# Patient Record
Sex: FEMALE | Race: BLACK OR AFRICAN AMERICAN | Employment: UNEMPLOYED | URBAN - METROPOLITAN AREA
[De-identification: names, ages, dates, MRNs, and addresses within clinical notes are randomized per-mention and may not be internally consistent; named-entity substitution may affect disease eponyms.]

---

## 2017-02-10 ENCOUNTER — OFFICE VISIT (OUTPATIENT)
Dept: FAMILY MEDICINE CLINIC | Facility: CLINIC | Age: 52
End: 2017-02-10

## 2017-02-10 VITALS
RESPIRATION RATE: 22 BRPM | BODY MASS INDEX: 40.43 KG/M2 | DIASTOLIC BLOOD PRESSURE: 78 MMHG | HEIGHT: 65 IN | OXYGEN SATURATION: 99 % | WEIGHT: 242.63 LBS | SYSTOLIC BLOOD PRESSURE: 126 MMHG | HEART RATE: 90 BPM | TEMPERATURE: 99 F

## 2017-02-10 DIAGNOSIS — H69.83 EUSTACHIAN TUBE DYSFUNCTION, BILATERAL: ICD-10-CM

## 2017-02-10 DIAGNOSIS — J30.2 SEASONAL ALLERGIC RHINITIS, UNSPECIFIED ALLERGIC RHINITIS TRIGGER: Primary | ICD-10-CM

## 2017-02-10 PROCEDURE — 99203 OFFICE O/P NEW LOW 30 MIN: CPT | Performed by: NURSE PRACTITIONER

## 2017-02-10 RX ORDER — ATORVASTATIN CALCIUM 80 MG/1
TABLET, FILM COATED ORAL
Refills: 0 | COMMUNITY
Start: 2016-11-18 | End: 2017-05-09

## 2017-02-10 RX ORDER — ATORVASTATIN CALCIUM 40 MG/1
TABLET, FILM COATED ORAL
Refills: 1 | COMMUNITY
Start: 2017-01-21 | End: 2017-02-10 | Stop reason: ALTCHOICE

## 2017-02-10 RX ORDER — METHYLPREDNISOLONE 4 MG/1
TABLET ORAL
Qty: 1 KIT | Refills: 0 | Status: SHIPPED | OUTPATIENT
Start: 2017-02-10 | End: 2017-05-09

## 2017-02-10 RX ORDER — FLUTICASONE PROPIONATE 50 MCG
2 SPRAY, SUSPENSION (ML) NASAL DAILY
Qty: 1 BOTTLE | Refills: 1 | Status: SHIPPED | OUTPATIENT
Start: 2017-02-10 | End: 2017-02-24

## 2017-02-10 RX ORDER — LOSARTAN POTASSIUM AND HYDROCHLOROTHIAZIDE 25; 100 MG/1; MG/1
TABLET ORAL
Refills: 1 | COMMUNITY
Start: 2017-01-22 | End: 2017-05-09

## 2017-02-10 NOTE — PROGRESS NOTES
CHIEF COMPLAINT:     Patient presents with:  Dizziness: throbbing in both ears X 1 wk states she has not been taking her BP meds last few days and has not felt sx, does not know if sx could be related to meds, does not know if could possibly be sx with ear EYES: conjunctiva clear, EOM intact, PERRLA, limited non-dialiated exam normal.  EARS: TM's bilaterally grey, no bulging, no retraction, ++ fluid, bony landmarks obscured  NOSE: nostrils edematous, no exudates, nasal mucosa erythematous and inflamed  THROA Find a new PCP. If no improvement in ear pain, follow up with prior PCP office associates. Patient Instructions       Understanding Nasal Allergies  Nasal allergies (also called allergic rhinitis) are a common health problem.  They may be seasonal. This · Nonallergic rhinitis and viruses such as colds  · Irritants and pollutants, such as strong odors or smoke  · Certain medicines  · Changes in the weather   Treatment  Your healthcare provider will evaluate you to find the cause of your symptoms then recom

## 2017-03-14 RX ORDER — FLUTICASONE PROPIONATE 50 MCG
SPRAY, SUSPENSION (ML) NASAL
Refills: 0 | OUTPATIENT
Start: 2017-03-14

## 2017-05-09 ENCOUNTER — OFFICE VISIT (OUTPATIENT)
Dept: INTERNAL MEDICINE CLINIC | Facility: CLINIC | Age: 52
End: 2017-05-09

## 2017-05-09 ENCOUNTER — TELEPHONE (OUTPATIENT)
Dept: INTERNAL MEDICINE CLINIC | Facility: CLINIC | Age: 52
End: 2017-05-09

## 2017-05-09 VITALS
DIASTOLIC BLOOD PRESSURE: 90 MMHG | HEIGHT: 65 IN | TEMPERATURE: 98 F | WEIGHT: 246 LBS | BODY MASS INDEX: 40.98 KG/M2 | HEART RATE: 94 BPM | SYSTOLIC BLOOD PRESSURE: 132 MMHG | RESPIRATION RATE: 20 BRPM

## 2017-05-09 DIAGNOSIS — Z12.11 SCREEN FOR COLON CANCER: ICD-10-CM

## 2017-05-09 DIAGNOSIS — E11.9 TYPE 2 DIABETES MELLITUS WITHOUT COMPLICATION, WITHOUT LONG-TERM CURRENT USE OF INSULIN (HCC): Primary | ICD-10-CM

## 2017-05-09 DIAGNOSIS — E11.9 DIABETIC EYE EXAM (HCC): ICD-10-CM

## 2017-05-09 DIAGNOSIS — Z12.31 SCREENING MAMMOGRAM, ENCOUNTER FOR: ICD-10-CM

## 2017-05-09 DIAGNOSIS — Z00.00 PE (PHYSICAL EXAM), ROUTINE: ICD-10-CM

## 2017-05-09 DIAGNOSIS — Z01.00 DIABETIC EYE EXAM (HCC): ICD-10-CM

## 2017-05-09 PROBLEM — E11.621 TYPE 2 DIABETES MELLITUS WITH FOOT ULCER, WITHOUT LONG-TERM CURRENT USE OF INSULIN (HCC): Status: ACTIVE | Noted: 2017-05-09

## 2017-05-09 PROBLEM — L97.509 TYPE 2 DIABETES MELLITUS WITH FOOT ULCER, WITHOUT LONG-TERM CURRENT USE OF INSULIN (HCC): Status: ACTIVE | Noted: 2017-05-09

## 2017-05-09 PROCEDURE — 99386 PREV VISIT NEW AGE 40-64: CPT | Performed by: INTERNAL MEDICINE

## 2017-05-09 RX ORDER — ATORVASTATIN CALCIUM 40 MG/1
40 TABLET, FILM COATED ORAL NIGHTLY
Qty: 90 TABLET | Refills: 1 | Status: SHIPPED | OUTPATIENT
Start: 2017-05-09 | End: 2017-09-06

## 2017-05-09 RX ORDER — LANCETS 30 GAUGE
EACH MISCELLANEOUS 3 TIMES DAILY
COMMUNITY
End: 2017-05-09

## 2017-05-09 RX ORDER — FLUTICASONE PROPIONATE 50 MCG
SPRAY, SUSPENSION (ML) NASAL
Refills: 1 | COMMUNITY
Start: 2017-03-09 | End: 2017-09-06

## 2017-05-09 RX ORDER — LANCETS 30 GAUGE
30 EACH MISCELLANEOUS 3 TIMES DAILY
Qty: 200 EACH | Refills: 11 | Status: SHIPPED | OUTPATIENT
Start: 2017-05-09

## 2017-05-09 RX ORDER — ATORVASTATIN CALCIUM 40 MG/1
40 TABLET, FILM COATED ORAL NIGHTLY
Refills: 1 | COMMUNITY
Start: 2017-04-23 | End: 2017-05-09

## 2017-05-09 RX ORDER — LOSARTAN POTASSIUM AND HYDROCHLOROTHIAZIDE 25; 100 MG/1; MG/1
TABLET ORAL
Qty: 90 TABLET | Refills: 1 | Status: SHIPPED | OUTPATIENT
Start: 2017-05-09 | End: 2017-09-06

## 2017-05-09 NOTE — PROGRESS NOTES
HPI:    Patient ID: Roman Dubose is a 45year old female.     HPI Comments: Patient patient presents today for physical exam and  dm2  - state her   Diabetes is stable , states doing well otherwise, denies chest pain, shortness of breath, dyspnea on exertion Nasal Suspension SHAKE LQ AND U 2 SPRAYS IEN D Disp:  Rfl: 1   MetFORMIN HCl 1000 MG Oral Tab TK 1 T PO BID before  Meals Disp: 180 tablet Rfl: 1   Losartan Potassium-HCTZ 100-25 MG Oral Tab TK 1 T PO D Disp: 90 tablet Rfl: 1   atorvastatin 40 MG Oral Tab Psychiatric: She has a normal mood and affect. Her behavior is normal.   Nursing note and vitals reviewed. Blood pressure 132/90, pulse 94, temperature 97.5 °F (36.4 °C), temperature source Oral, resp.  rate 20, height 5' 5\" (1.651 m), weight 246 lb [E]    Meds This Visit:  Signed Prescriptions Disp Refills    MetFORMIN HCl 1000 MG Oral Tab 180 tablet 1      Sig: TK 1 T PO BID before  Meals      Losartan Potassium-HCTZ 100-25 MG Oral Tab 90 tablet 1      Sig: TK 1 T PO D      atorvastatin 40 MG Oral T

## 2017-05-09 NOTE — TELEPHONE ENCOUNTER
Pt is asking for a note stating that she can get a dental deep cleaning due to her having diabetes. Is it ok to generate the note. Please advise, thank you.

## 2017-05-09 NOTE — TELEPHONE ENCOUNTER
Pt said EV was to do order/referral for a  Dentist- does not have information in paperwork from appt today with  EV  Requesting call back

## 2017-05-10 ENCOUNTER — TELEPHONE (OUTPATIENT)
Dept: INTERNAL MEDICINE CLINIC | Facility: CLINIC | Age: 52
End: 2017-05-10

## 2017-05-10 DIAGNOSIS — E11.9 DIABETIC EYE EXAM (HCC): Primary | ICD-10-CM

## 2017-05-10 DIAGNOSIS — Z01.00 DIABETIC EYE EXAM (HCC): Primary | ICD-10-CM

## 2017-05-10 NOTE — TELEPHONE ENCOUNTER
Dr. Jake Tian please be advised that referral canceled, Dr. Howie Posada is not in network. Please redirect patient to an in . Thank you.

## 2017-05-10 NOTE — TELEPHONE ENCOUNTER
Message was left on voice mail that the new referral for Dr. Rylee Marshall has been placed in the system and phone number was provided for scheduling the appt.

## 2017-05-10 NOTE — TELEPHONE ENCOUNTER
Pt  Need  Labs  Done - if  In Acceptable  Range -  patient  Does  not need approval  From me (  unless had   Heart  surgery  Or  Valve problems   ) For  Dental Cleaning .

## 2017-05-15 NOTE — TELEPHONE ENCOUNTER
Pt was contacted and was inquiring about the referral for ophthalmologist and she was informed was placed

## 2017-05-15 NOTE — TELEPHONE ENCOUNTER
Pt was contacted and informed that the referral is for opthalmology and the referral for the dentist is Dr. Kevin Renteria. Pt also stated that her insurance will only pay for the [employer] brand test strips.  TruMetrix was ordered and strips was sent to the pharmacy

## 2017-05-15 NOTE — TELEPHONE ENCOUNTER
Pt returning Dr. Liudmila Hannah Nurse call. Pt is requesting a callback from [place]. [facility] states call dropped and was unable to reach Pt. Please advise.

## 2017-05-18 ENCOUNTER — APPOINTMENT (OUTPATIENT)
Dept: ENDOCRINOLOGY | Facility: HOSPITAL | Age: 52
End: 2017-05-18
Attending: INTERNAL MEDICINE

## 2017-05-19 ENCOUNTER — TELEPHONE (OUTPATIENT)
Dept: FAMILY MEDICINE CLINIC | Facility: CLINIC | Age: 52
End: 2017-05-19

## 2017-05-19 DIAGNOSIS — R92.8 ABNORMAL MAMMOGRAM OF RIGHT BREAST: Primary | ICD-10-CM

## 2017-05-19 NOTE — TELEPHONE ENCOUNTER
Pharm is calling state that they receive a prescription for test strip state that pt inform them that she need a meter also   Pharm is requesting a order for a Diabetic Meter

## 2017-05-19 NOTE — TELEPHONE ENCOUNTER
Notified pt. Glucose meter and strips are approved and that her blood work is ordered for her to complete.

## 2017-07-05 ENCOUNTER — OFFICE VISIT (OUTPATIENT)
Dept: GASTROENTEROLOGY | Facility: CLINIC | Age: 52
End: 2017-07-05

## 2017-07-05 ENCOUNTER — TELEPHONE (OUTPATIENT)
Dept: GASTROENTEROLOGY | Facility: CLINIC | Age: 52
End: 2017-07-05

## 2017-07-05 ENCOUNTER — LAB ENCOUNTER (OUTPATIENT)
Dept: LAB | Facility: HOSPITAL | Age: 52
End: 2017-07-05
Attending: INTERNAL MEDICINE
Payer: COMMERCIAL

## 2017-07-05 ENCOUNTER — TELEPHONE (OUTPATIENT)
Dept: PODIATRY CLINIC | Facility: CLINIC | Age: 52
End: 2017-07-05

## 2017-07-05 ENCOUNTER — HOSPITAL ENCOUNTER (OUTPATIENT)
Dept: ENDOCRINOLOGY | Facility: HOSPITAL | Age: 52
Discharge: HOME | End: 2017-07-05
Attending: INTERNAL MEDICINE
Payer: COMMERCIAL

## 2017-07-05 ENCOUNTER — HOSPITAL ENCOUNTER (OUTPATIENT)
Dept: MAMMOGRAPHY | Facility: HOSPITAL | Age: 52
Discharge: HOME | End: 2017-07-05
Attending: INTERNAL MEDICINE
Payer: COMMERCIAL

## 2017-07-05 ENCOUNTER — OFFICE VISIT (OUTPATIENT)
Dept: PODIATRY CLINIC | Facility: CLINIC | Age: 52
End: 2017-07-05

## 2017-07-05 VITALS — BODY MASS INDEX: 41.55 KG/M2 | HEIGHT: 65 IN | WEIGHT: 249.38 LBS

## 2017-07-05 VITALS
HEIGHT: 66 IN | DIASTOLIC BLOOD PRESSURE: 81 MMHG | SYSTOLIC BLOOD PRESSURE: 124 MMHG | WEIGHT: 248.63 LBS | HEART RATE: 91 BPM

## 2017-07-05 DIAGNOSIS — Z12.11 COLON CANCER SCREENING: Primary | ICD-10-CM

## 2017-07-05 DIAGNOSIS — Z12.31 SCREENING MAMMOGRAM, ENCOUNTER FOR: ICD-10-CM

## 2017-07-05 DIAGNOSIS — R92.8 ABNORMAL MAMMOGRAM OF RIGHT BREAST: Primary | ICD-10-CM

## 2017-07-05 DIAGNOSIS — E11.9 TYPE 2 DIABETES MELLITUS WITHOUT COMPLICATION, WITHOUT LONG-TERM CURRENT USE OF INSULIN (HCC): Primary | ICD-10-CM

## 2017-07-05 DIAGNOSIS — E11.9 TYPE 2 DIABETES MELLITUS WITHOUT COMPLICATION, WITHOUT LONG-TERM CURRENT USE OF INSULIN (HCC): ICD-10-CM

## 2017-07-05 LAB
ALBUMIN SERPL BCP-MCNC: 3.6 G/DL (ref 3.5–4.8)
ALBUMIN/GLOB SERPL: 0.9 {RATIO} (ref 1–2)
ALP SERPL-CCNC: 68 U/L (ref 32–100)
ALT SERPL-CCNC: 21 U/L (ref 14–54)
ANION GAP SERPL CALC-SCNC: 8 MMOL/L (ref 0–18)
AST SERPL-CCNC: 16 U/L (ref 15–41)
BASOPHILS # BLD: 0.1 K/UL (ref 0–0.2)
BASOPHILS NFR BLD: 1 %
BILIRUB SERPL-MCNC: 0.7 MG/DL (ref 0.3–1.2)
BILIRUB UR QL: NEGATIVE
BUN SERPL-MCNC: 17 MG/DL (ref 8–20)
BUN/CREAT SERPL: 24.3 (ref 10–20)
CALCIUM SERPL-MCNC: 9.4 MG/DL (ref 8.5–10.5)
CHLORIDE SERPL-SCNC: 101 MMOL/L (ref 95–110)
CHOLEST SERPL-MCNC: 190 MG/DL (ref 110–200)
CO2 SERPL-SCNC: 28 MMOL/L (ref 22–32)
COLOR UR: YELLOW
CREAT SERPL-MCNC: 0.7 MG/DL (ref 0.5–1.5)
CREAT UR-MCNC: 87 MG/DL
EOSINOPHIL # BLD: 0.2 K/UL (ref 0–0.7)
EOSINOPHIL NFR BLD: 3 %
ERYTHROCYTE [DISTWIDTH] IN BLOOD BY AUTOMATED COUNT: 15.1 % (ref 11–15)
GLOBULIN PLAS-MCNC: 3.9 G/DL (ref 2.5–3.7)
GLUCOSE SERPL-MCNC: 152 MG/DL (ref 70–99)
GLUCOSE UR-MCNC: NEGATIVE MG/DL
HCT VFR BLD AUTO: 35.7 % (ref 35–48)
HDLC SERPL-MCNC: 39 MG/DL
HGB BLD-MCNC: 11.6 G/DL (ref 12–16)
HGB UR QL STRIP.AUTO: NEGATIVE
KETONES UR-MCNC: NEGATIVE MG/DL
LDLC SERPL CALC-MCNC: 130 MG/DL (ref 0–99)
LYMPHOCYTES # BLD: 2.9 K/UL (ref 1–4)
LYMPHOCYTES NFR BLD: 43 %
MCH RBC QN AUTO: 25.6 PG (ref 27–32)
MCHC RBC AUTO-ENTMCNC: 32.5 G/DL (ref 32–37)
MCV RBC AUTO: 78.9 FL (ref 80–100)
MICROALBUMIN UR-MCNC: 0.3 MG/DL (ref 0–1.8)
MICROALBUMIN/CREAT UR: 3.4 MG/G{CREAT} (ref 0–20)
MONOCYTES # BLD: 0.5 K/UL (ref 0–1)
MONOCYTES NFR BLD: 8 %
NEUTROPHILS # BLD AUTO: 3 K/UL (ref 1.8–7.7)
NEUTROPHILS NFR BLD: 45 %
NITRITE UR QL STRIP.AUTO: NEGATIVE
NONHDLC SERPL-MCNC: 151 MG/DL
OSMOLALITY UR CALC.SUM OF ELEC: 289 MOSM/KG (ref 275–295)
PH UR: 6 [PH] (ref 5–8)
PLATELET # BLD AUTO: 362 K/UL (ref 140–400)
PMV BLD AUTO: 8.2 FL (ref 7.4–10.3)
POTASSIUM SERPL-SCNC: 4.1 MMOL/L (ref 3.3–5.1)
PROT SERPL-MCNC: 7.5 G/DL (ref 5.9–8.4)
PROT UR-MCNC: NEGATIVE MG/DL
RBC # BLD AUTO: 4.52 M/UL (ref 3.7–5.4)
RBC #/AREA URNS AUTO: 1 /HPF
SODIUM SERPL-SCNC: 137 MMOL/L (ref 136–144)
SP GR UR STRIP: 1.01 (ref 1–1.03)
TRIGL SERPL-MCNC: 103 MG/DL (ref 1–149)
TSH SERPL-ACNC: 1.96 UIU/ML (ref 0.45–5.33)
UROBILINOGEN UR STRIP-ACNC: <2
VIT C UR-MCNC: NEGATIVE MG/DL
WBC # BLD AUTO: 6.7 K/UL (ref 4–11)
WBC #/AREA URNS AUTO: 4 /HPF

## 2017-07-05 PROCEDURE — 36415 COLL VENOUS BLD VENIPUNCTURE: CPT

## 2017-07-05 PROCEDURE — 81001 URINALYSIS AUTO W/SCOPE: CPT

## 2017-07-05 PROCEDURE — 99243 OFF/OP CNSLTJ NEW/EST LOW 30: CPT | Performed by: PODIATRIST

## 2017-07-05 PROCEDURE — 80053 COMPREHEN METABOLIC PANEL: CPT

## 2017-07-05 PROCEDURE — 82043 UR ALBUMIN QUANTITATIVE: CPT

## 2017-07-05 PROCEDURE — 83036 HEMOGLOBIN GLYCOSYLATED A1C: CPT

## 2017-07-05 PROCEDURE — 84443 ASSAY THYROID STIM HORMONE: CPT

## 2017-07-05 PROCEDURE — 77067 SCR MAMMO BI INCL CAD: CPT | Performed by: INTERNAL MEDICINE

## 2017-07-05 PROCEDURE — 99243 OFF/OP CNSLTJ NEW/EST LOW 30: CPT | Performed by: INTERNAL MEDICINE

## 2017-07-05 PROCEDURE — 80061 LIPID PANEL: CPT

## 2017-07-05 PROCEDURE — 82570 ASSAY OF URINE CREATININE: CPT

## 2017-07-05 PROCEDURE — 85025 COMPLETE CBC W/AUTO DIFF WBC: CPT

## 2017-07-05 PROCEDURE — 99212 OFFICE O/P EST SF 10 MIN: CPT | Performed by: INTERNAL MEDICINE

## 2017-07-05 PROCEDURE — 99212 OFFICE O/P EST SF 10 MIN: CPT | Performed by: PODIATRIST

## 2017-07-05 RX ORDER — ASPIRIN 81 MG/1
81 TABLET, CHEWABLE ORAL DAILY
COMMUNITY

## 2017-07-05 NOTE — TELEPHONE ENCOUNTER
Pt asking to talk to  about ankle injury - states she hurt her ankle again after she left his office today - asking if she should wrap it

## 2017-07-05 NOTE — PROGRESS NOTES
HPI:    Patient ID: Yelena Camp is a 45year old female. HPI  This 50-year-old female presents as a new patient to me on consult from [address] Patient states that she is here for a diabetic foot evaluation.   She is accompanied today by her daugh there is no evidence of loss of sensation. Patient is moderately active and has no calf pain with activity. She has no night cramps. She is wearing sandals today. At present I see no evidence of concern in reference to diabetes.   I cautioned her about

## 2017-07-05 NOTE — PROGRESS NOTES
Christopher Michael  : 1965 attended initial assessment for Diabetes Education:    Date: 2017   Start time: 1300  End time: 1410    Ht 65\"   Wt 249 lb 6.4 oz   Breastfeeding?  Yes   BMI 41.50 kg/m²      No results found for: A1C is pending      Diabete glucose levels. Introduced goal setting. Recommendations:      Monitor blood glucose as directed. Follow Basic Diet Guidelines. Attend Group Class series (4 classes)    Written materials provided for all areas covered.     Patient verbalized Sia Pearson

## 2017-07-05 NOTE — PROGRESS NOTES
Paolo Ludwig is a 45year old female. HPI:   Patient presents with:  Colonoscopy Screening: No prior colon. No current complaints. The patient is a 48-year-old female with a history of diabetes who presents for colon cancer screening.   The patient TK 1 T PO D Disp: 90 tablet Rfl: 1   atorvastatin 40 MG Oral Tab Take 1 tablet (40 mg total) by mouth nightly. Disp: 90 tablet Rfl: 1   LANCETS ULTRA THIN 30G Does not apply Misc 30 g by Does not apply route 3 (three) times daily.  Disp: 200 each Rfl: 11

## 2017-07-05 NOTE — TELEPHONE ENCOUNTER
Scheduled for:  Colonoscopy [address]4067  Provider Name:  Date:  9/14/17  Location:  [facility]  Sedation: Mac Sedation   Time:  11:15 am / arrival 10:15 am  Prep: Colyte Prep  Meds/Allergies Reconciled?:  Physician reviewed  Diagnosis with codes:  Colon Cancer

## 2017-07-05 NOTE — TELEPHONE ENCOUNTER
S/w pt and she states after her OV w/ SCR today she went for labs and hit her left ankle on furniture in the same spot it has been hurting and she's having a lot of pain. Pt is wondering is SCR can order some kind of support for her ankle?

## 2017-07-06 ENCOUNTER — PATIENT MESSAGE (OUTPATIENT)
Dept: INTERNAL MEDICINE CLINIC | Facility: CLINIC | Age: 52
End: 2017-07-06

## 2017-07-06 ENCOUNTER — TELEPHONE (OUTPATIENT)
Dept: GASTROENTEROLOGY | Facility: CLINIC | Age: 52
End: 2017-07-06

## 2017-07-06 LAB — HBA1C MFR BLD: 6.6 % (ref 4–6)

## 2017-07-06 NOTE — TELEPHONE ENCOUNTER
Current Outpatient Prescriptions:  aspirin 81 MG Oral Chew Tab Chew 81 mg by mouth daily. Disp:  Rfl:    Blood Glucose Monitoring Suppl (TRUE METRIX METER) w/Device Does not apply Kit 1 kit by Does not apply route 3 (three) times daily.  Disp: 1 kit Rfl:

## 2017-07-08 NOTE — TELEPHONE ENCOUNTER
From: Cierra Barrientos  To: Shefali Laughlin MD  Sent: 7/6/2017 9:52 AM CDT  Subject: Test Results Question    Had testing on yesterday signed up on my chart  Test results are still not showing for me to view

## 2017-07-10 ENCOUNTER — TELEPHONE (OUTPATIENT)
Dept: INTERNAL MEDICINE CLINIC | Facility: CLINIC | Age: 52
End: 2017-07-10

## 2017-07-10 NOTE — TELEPHONE ENCOUNTER
Notes Recorded by Rachele Smith MD on 7/9/2017 at 9:35 PM CDT  Addendum -correction cholesterol mildly elevated LDL decrease fat in diet ,exercise and continue present medications  ------    Notes Recorded by Rachele Smith MD on 7/9/2017 at 9:3

## 2017-07-10 NOTE — TELEPHONE ENCOUNTER
Spoke with patient (verified name and ), reviewed information, patient verbalized understanding and agrees with plan.    Reviewed diet modification such as reducing carbohydrates, fatty foods and alcohol intake, replacing them whenever possible with lean

## 2017-07-10 NOTE — TELEPHONE ENCOUNTER
Per pt, she saw her result in her Mychart but would like to talk to EV and explain about the result.

## 2017-07-10 NOTE — PROGRESS NOTES
Please call patient with blood test results.     Kidney and liver function are normal,   Mild anemia is present-might need some evaluation patient to follow-up in office next for 5 weeks for further testing- labs  Cholesterol is normal   sugar is stable A1c

## 2017-07-12 ENCOUNTER — PATIENT MESSAGE (OUTPATIENT)
Dept: INTERNAL MEDICINE CLINIC | Facility: CLINIC | Age: 52
End: 2017-07-12

## 2017-07-15 NOTE — TELEPHONE ENCOUNTER
From: Uma Godfrey  To: Kashif Wetzel MD  Sent: 7/12/2017 10:00 AM CDT  Subject: Test Results Question    Had Mammogram on 7/5/17 would like to know  if the results are completed. Thank you.     Have a wonderful day

## 2017-07-17 ENCOUNTER — PATIENT MESSAGE (OUTPATIENT)
Dept: INTERNAL MEDICINE CLINIC | Facility: CLINIC | Age: 52
End: 2017-07-17

## 2017-07-17 ENCOUNTER — TELEPHONE (OUTPATIENT)
Dept: INTERNAL MEDICINE CLINIC | Facility: CLINIC | Age: 52
End: 2017-07-17

## 2017-07-17 NOTE — TELEPHONE ENCOUNTER
Pt said she is returning Dr Varun Garcia call re mammogram  Joe Alexander received call on Sat to return call- nothing in [employer] message also sent via [address]

## 2017-07-18 NOTE — TELEPHONE ENCOUNTER
Pt was contacted and explained the results of her mammogram. Pt stated that she call and got an appt for next week for additional views of her breast for the mammogram but that it was mentioned about a biopsy.  Pt was informed that they probably will do the

## 2017-07-19 NOTE — TELEPHONE ENCOUNTER
From: Genet Mcmillan  To: Melecio Raines MD  Sent: 7/17/2017 11:13 AM CDT  Subject: Test Results Question    Received a messages on my chart from  Dr. Orestes Del Valle along with a voice mail message  requesting I contact the office and ask for  her regarding my M

## 2017-07-19 NOTE — TELEPHONE ENCOUNTER
Patient already notified.  See below:    July 18, 2017   Karen Lira LPN     46:78 AM   Note      Pt was contacted and explained the results of her mammogram. Pt stated that she call and got an appt for next week for additional views of her breast for th

## 2017-07-26 ENCOUNTER — HOSPITAL ENCOUNTER (OUTPATIENT)
Dept: MAMMOGRAPHY | Facility: HOSPITAL | Age: 52
Discharge: HOME | End: 2017-07-26
Attending: INTERNAL MEDICINE
Payer: COMMERCIAL

## 2017-07-26 ENCOUNTER — HOSPITAL ENCOUNTER (OUTPATIENT)
Dept: ULTRASOUND IMAGING | Facility: HOSPITAL | Age: 52
Discharge: HOME | End: 2017-07-26
Attending: INTERNAL MEDICINE
Payer: COMMERCIAL

## 2017-07-26 DIAGNOSIS — R92.8 ABNORMAL MAMMOGRAM: ICD-10-CM

## 2017-07-26 PROCEDURE — 77065 DX MAMMO INCL CAD UNI: CPT | Performed by: INTERNAL MEDICINE

## 2017-07-26 PROCEDURE — 76642 ULTRASOUND BREAST LIMITED: CPT | Performed by: INTERNAL MEDICINE

## 2017-08-02 ENCOUNTER — TELEPHONE (OUTPATIENT)
Dept: ENDOCRINOLOGY | Facility: HOSPITAL | Age: 52
End: 2017-08-02

## 2017-08-02 ENCOUNTER — TELEPHONE (OUTPATIENT)
Dept: INTERNAL MEDICINE CLINIC | Facility: CLINIC | Age: 52
End: 2017-08-02

## 2017-08-02 NOTE — TELEPHONE ENCOUNTER
Pt calling regarding her mammogram results. Pt state she was advised to see a specialist.  Pt needs more clarification regarding her results. Also pt wants to discuss her anemia results as well. .please advise

## 2017-08-03 NOTE — TELEPHONE ENCOUNTER
Pt was given the results of her mammogram and US. Pt had questions regarding her appt with the breast specialist and information was given and phone number to schedule the appt. Understanding was voiced.

## 2017-08-18 ENCOUNTER — OFFICE VISIT (OUTPATIENT)
Dept: SURGERY | Facility: CLINIC | Age: 52
End: 2017-08-18

## 2017-08-18 VITALS
WEIGHT: 245 LBS | RESPIRATION RATE: 20 BRPM | DIASTOLIC BLOOD PRESSURE: 80 MMHG | BODY MASS INDEX: 41 KG/M2 | TEMPERATURE: 98 F | SYSTOLIC BLOOD PRESSURE: 116 MMHG | HEART RATE: 99 BPM

## 2017-08-18 DIAGNOSIS — R92.8 ABNORMAL MAMMOGRAM OF RIGHT BREAST: Primary | ICD-10-CM

## 2017-08-18 PROCEDURE — 99244 OFF/OP CNSLTJ NEW/EST MOD 40: CPT | Performed by: SURGERY

## 2017-08-18 NOTE — PROGRESS NOTES
Pt had screening mammogram 7/5/17, additional views and US on 7/28/17. She doesn't feel anything, denies breast pain.

## 2017-08-21 ENCOUNTER — TELEPHONE (OUTPATIENT)
Dept: INTERNAL MEDICINE CLINIC | Facility: CLINIC | Age: 52
End: 2017-08-21

## 2017-08-21 ENCOUNTER — SOCIAL WORK SERVICES (OUTPATIENT)
Dept: HEMATOLOGY/ONCOLOGY | Facility: HOSPITAL | Age: 52
End: 2017-08-21

## 2017-08-21 NOTE — PROGRESS NOTES
MELODY received call from patient. [facility] date is [facility]1 so she is in the open enrollment period now. She wishes to switch to [facility] and was told that Daren Rehman is not in network.   MELODY advises that this IS among the m

## 2017-08-21 NOTE — TELEPHONE ENCOUNTER
Pt states she is returning call from [address] with EV from Friday  Nothing in Epic  Requesting callback today

## 2017-08-23 NOTE — TELEPHONE ENCOUNTER
Pt was contacted and it was stated that she was having questions regarding which insurance to take. Pt was informed that she will have to decide on which one to choose. Understanding was voiced.  Pt also had questions regarding her anemia and it was stated

## 2017-09-05 ENCOUNTER — TELEPHONE (OUTPATIENT)
Dept: INTERNAL MEDICINE CLINIC | Facility: CLINIC | Age: 52
End: 2017-09-05

## 2017-09-06 ENCOUNTER — OFFICE VISIT (OUTPATIENT)
Dept: INTERNAL MEDICINE CLINIC | Facility: CLINIC | Age: 52
End: 2017-09-06

## 2017-09-06 VITALS
DIASTOLIC BLOOD PRESSURE: 84 MMHG | WEIGHT: 249 LBS | TEMPERATURE: 97 F | HEIGHT: 66 IN | RESPIRATION RATE: 20 BRPM | BODY MASS INDEX: 40.02 KG/M2 | SYSTOLIC BLOOD PRESSURE: 125 MMHG | HEART RATE: 91 BPM

## 2017-09-06 DIAGNOSIS — D57.3 SICKLE CELL TRAIT (HCC): ICD-10-CM

## 2017-09-06 DIAGNOSIS — D64.9 ANEMIA, UNSPECIFIED TYPE: Primary | ICD-10-CM

## 2017-09-06 DIAGNOSIS — M76.60 ACHILLES TENDON PAIN: ICD-10-CM

## 2017-09-06 PROCEDURE — 99212 OFFICE O/P EST SF 10 MIN: CPT | Performed by: INTERNAL MEDICINE

## 2017-09-06 PROCEDURE — 99214 OFFICE O/P EST MOD 30 MIN: CPT | Performed by: INTERNAL MEDICINE

## 2017-09-06 RX ORDER — FLUTICASONE PROPIONATE 50 MCG
SPRAY, SUSPENSION (ML) NASAL
Qty: 1 BOTTLE | Refills: 1 | Status: SHIPPED | OUTPATIENT
Start: 2017-09-06 | End: 2017-10-26

## 2017-09-06 RX ORDER — LOSARTAN POTASSIUM AND HYDROCHLOROTHIAZIDE 25; 100 MG/1; MG/1
TABLET ORAL
Qty: 90 TABLET | Refills: 1 | Status: SHIPPED | OUTPATIENT
Start: 2017-09-06 | End: 2018-03-13

## 2017-09-06 RX ORDER — ATORVASTATIN CALCIUM 40 MG/1
40 TABLET, FILM COATED ORAL NIGHTLY
Qty: 90 TABLET | Refills: 1 | Status: SHIPPED | OUTPATIENT
Start: 2017-09-06 | End: 2018-03-13

## 2017-09-06 NOTE — PROGRESS NOTES
HPI:    Patient ID: Maria Antonia Rich is a 47year old female. Anemia   This is a chronic problem. The current episode started more than 1 year ago (hx  sickle  cell  atrait ). The problem has been unchanged.  Associated symptoms include arthralgias (r   poste aspirin 81 MG Oral Chew Tab Chew 81 mg by mouth daily. Disp:  Rfl:    Blood Glucose Monitoring Suppl (TRUE METRIX METER) w/Device Does not apply Kit 1 kit by Does not apply route 3 (three) times daily.  Disp: 1 kit Rfl: 0   Glucose Blood (TRUE METRIX BLOOD Psychiatric: She has a normal mood and affect. Her behavior is normal.   Nursing note and vitals reviewed. Blood pressure 125/84, pulse 91, temperature (!) 97.4 °F (36.3 °C), temperature source Oral, resp.  rate 20, height 5' 6\" (1.676 m), weight 249

## 2017-09-13 ENCOUNTER — TELEPHONE (OUTPATIENT)
Dept: GASTROENTEROLOGY | Facility: CLINIC | Age: 52
End: 2017-09-13

## 2017-09-13 NOTE — TELEPHONE ENCOUNTER
Sent call to RN - Pt has Questions re: Diet & Preps for 9/14/2017 CLN - ok to have crackers with broth and ok to use bottled water vs tap for prep mixture? Pls call. Thank you.

## 2017-09-13 NOTE — TELEPHONE ENCOUNTER
Pt transferred from phone room. Reviewed all diet instructions/prep for tomorrow's colonoscopy. She is holding her Metformin. She is to take her B/P med tomorrow morning with a tiny sip of water. She has her .  No further questions, states understandi

## 2017-09-14 ENCOUNTER — SURGERY (OUTPATIENT)
Age: 52
End: 2017-09-14

## 2017-09-14 ENCOUNTER — HOSPITAL ENCOUNTER (OUTPATIENT)
Facility: HOSPITAL | Age: 52
Setting detail: HOSPITAL OUTPATIENT SURGERY
Discharge: HOME | End: 2017-09-14
Attending: INTERNAL MEDICINE | Admitting: INTERNAL MEDICINE
Payer: COMMERCIAL

## 2017-09-14 ENCOUNTER — ANESTHESIA (OUTPATIENT)
Dept: ENDOSCOPY | Facility: HOSPITAL | Age: 52
End: 2017-09-14
Payer: COMMERCIAL

## 2017-09-14 ENCOUNTER — ANESTHESIA EVENT (OUTPATIENT)
Dept: ENDOSCOPY | Facility: HOSPITAL | Age: 52
End: 2017-09-14
Payer: COMMERCIAL

## 2017-09-14 VITALS
SYSTOLIC BLOOD PRESSURE: 144 MMHG | DIASTOLIC BLOOD PRESSURE: 94 MMHG | HEIGHT: 66 IN | WEIGHT: 240 LBS | RESPIRATION RATE: 16 BRPM | HEART RATE: 77 BPM | OXYGEN SATURATION: 97 % | BODY MASS INDEX: 38.57 KG/M2

## 2017-09-14 DIAGNOSIS — K63.5 POLYP OF DESCENDING COLON, UNSPECIFIED TYPE: ICD-10-CM

## 2017-09-14 DIAGNOSIS — K57.90 DIVERTICULOSIS OF INTESTINE WITHOUT BLEEDING, UNSPECIFIED INTESTINAL TRACT LOCATION: ICD-10-CM

## 2017-09-14 DIAGNOSIS — K64.9 HEMORRHOIDS, UNSPECIFIED HEMORRHOID TYPE: ICD-10-CM

## 2017-09-14 DIAGNOSIS — Z12.11 SCREENING FOR MALIGNANT NEOPLASM OF COLON: ICD-10-CM

## 2017-09-14 DIAGNOSIS — K63.5 POLYP OF SIGMOID COLON, UNSPECIFIED TYPE: Primary | ICD-10-CM

## 2017-09-14 LAB — GLUCOSE BLDC GLUCOMTR-MCNC: 134 MG/DL (ref 70–99)

## 2017-09-14 PROCEDURE — 45385 COLONOSCOPY W/LESION REMOVAL: CPT | Performed by: INTERNAL MEDICINE

## 2017-09-14 PROCEDURE — 0DBM8ZX EXCISION OF DESCENDING COLON, VIA NATURAL OR ARTIFICIAL OPENING ENDOSCOPIC, DIAGNOSTIC: ICD-10-PCS | Performed by: INTERNAL MEDICINE

## 2017-09-14 PROCEDURE — 0DBN8ZX EXCISION OF SIGMOID COLON, VIA NATURAL OR ARTIFICIAL OPENING ENDOSCOPIC, DIAGNOSTIC: ICD-10-PCS | Performed by: INTERNAL MEDICINE

## 2017-09-14 RX ORDER — MIDAZOLAM HYDROCHLORIDE 1 MG/ML
INJECTION INTRAMUSCULAR; INTRAVENOUS AS NEEDED
Status: DISCONTINUED | OUTPATIENT
Start: 2017-09-14 | End: 2017-09-14 | Stop reason: SURG

## 2017-09-14 RX ORDER — NALOXONE HYDROCHLORIDE 0.4 MG/ML
80 INJECTION, SOLUTION INTRAMUSCULAR; INTRAVENOUS; SUBCUTANEOUS AS NEEDED
Status: DISCONTINUED | OUTPATIENT
Start: 2017-09-14 | End: 2017-09-14

## 2017-09-14 RX ORDER — LIDOCAINE HYDROCHLORIDE 10 MG/ML
INJECTION, SOLUTION EPIDURAL; INFILTRATION; INTRACAUDAL; PERINEURAL AS NEEDED
Status: DISCONTINUED | OUTPATIENT
Start: 2017-09-14 | End: 2017-09-14 | Stop reason: SURG

## 2017-09-14 RX ORDER — SODIUM CHLORIDE, SODIUM LACTATE, POTASSIUM CHLORIDE, CALCIUM CHLORIDE 600; 310; 30; 20 MG/100ML; MG/100ML; MG/100ML; MG/100ML
INJECTION, SOLUTION INTRAVENOUS CONTINUOUS
Status: DISCONTINUED | OUTPATIENT
Start: 2017-09-14 | End: 2017-09-14

## 2017-09-14 RX ADMIN — MIDAZOLAM HYDROCHLORIDE 1 MG: 1 INJECTION INTRAMUSCULAR; INTRAVENOUS at 11:46:00

## 2017-09-14 RX ADMIN — SODIUM CHLORIDE, SODIUM LACTATE, POTASSIUM CHLORIDE, CALCIUM CHLORIDE: 600; 310; 30; 20 INJECTION, SOLUTION INTRAVENOUS at 11:33:00

## 2017-09-14 RX ADMIN — SODIUM CHLORIDE, SODIUM LACTATE, POTASSIUM CHLORIDE, CALCIUM CHLORIDE: 600; 310; 30; 20 INJECTION, SOLUTION INTRAVENOUS at 12:01:00

## 2017-09-14 RX ADMIN — LIDOCAINE HYDROCHLORIDE 50 MG: 10 INJECTION, SOLUTION EPIDURAL; INFILTRATION; INTRACAUDAL; PERINEURAL at 11:36:00

## 2017-09-14 RX ADMIN — MIDAZOLAM HYDROCHLORIDE 1 MG: 1 INJECTION INTRAMUSCULAR; INTRAVENOUS at 11:45:00

## 2017-09-14 NOTE — ANESTHESIA PREPROCEDURE EVALUATION
Anesthesia PreOp Note    HPI:     Taylor Patton is a 47year old female who presents for preoperative consultation requested by: Nilesh Ash MD    Date of Surgery: 9/14/2017    Procedure(s):  COLONOSCOPY  Indication: Screening for malignant neoplasm No current Epic-ordered facility-administered medications on file. No current [facility]-ordered outpatient prescriptions on file.       Doxycycline                 Family History   Problem Relation Age of Onset   • Diabetes Mother    • Other[other] Luciano Lovell [de-identified] Neuro/Psych - negative ROS     GI/Hepatic/Renal - negative ROS     Endo/Other    (+) diabetes mellitus type 2 well controlled,     Comments: Sickle cell trait  Abdominal              Anesthesia Plan:   ASA:  3  Plan:   MAC  Informed Consent Plan and Risks

## 2017-09-14 NOTE — OPERATIVE REPORT
[facility] HOSP - [facility] Endoscopy Report      Preoperative Diagnosis:  - colon screening      Postoperative Diagnosis:  - colon polyps x 2  - diverticulosis  - internal hemorrhoids      Procedure:    Colonoscopy       Surgeon:  Caryn Trivedi M.D.     An

## 2017-09-14 NOTE — H&P
History & Physical Examination    Patient Name: Fabiola Gurrola  MRN: [id number]  CSN: [id number]  YOB: 1965    Diagnosis: colon screening        Prescriptions Prior to Admission:  Fluticasone Propionate 50 MCG/ACT Nasal Suspension SHAKE LQ AND HEART [x ] [ x]    LUNGS [x ] [ x]    ABDOMEN [x ] [x ]    UROGENITAL [ ] [ ]    EXTREMITIES [x ] [x ]    OTHER        [ x ] I have discussed the risks and benefits and alternatives with the patient/family.   They understand and agree to proceed with plan

## 2017-09-14 NOTE — ANESTHESIA POSTPROCEDURE EVALUATION
Patient: Jesenia Finnegan    Procedure Summary     Date:  09/14/17 Room / Location:  [facility] ENDOSCOPY 05 / [facility] ENDOSCOPY    Anesthesia Start:  5111 Anesthesia Stop:      Procedure:  COLONOSCOPY (N/A ) Diagnosis:       Screening for malignant neoplasm of colon

## 2017-09-19 ENCOUNTER — TELEPHONE (OUTPATIENT)
Dept: GASTROENTEROLOGY | Facility: CLINIC | Age: 52
End: 2017-09-19

## 2017-09-19 NOTE — TELEPHONE ENCOUNTER
----- Message from Fior Benavides MD sent at 9/18/2017  6:55 PM CDT -----  I wanted to get back to you with your colonoscopy results. You had 2 colon polyps removed which were benign.   I would advise a repeat colonoscopy in 3 years to make sure no new p

## 2017-09-20 ENCOUNTER — TELEPHONE (OUTPATIENT)
Dept: GASTROENTEROLOGY | Facility: CLINIC | Age: 52
End: 2017-09-20

## 2017-09-20 NOTE — TELEPHONE ENCOUNTER
Pt called for results. She did not receive message via My Chart (My Chart is down today per pt). Please call.

## 2017-09-20 NOTE — TELEPHONE ENCOUNTER
Left message on voicemail to call for results. Please see the \"lab\" tab and review 9/14 pathology-- results there.

## 2017-10-28 RX ORDER — FLUTICASONE PROPIONATE 50 MCG
SPRAY, SUSPENSION (ML) NASAL
Qty: 1 INHALER | Refills: 0 | Status: SHIPPED | OUTPATIENT
Start: 2017-10-28 | End: 2018-03-13

## 2017-10-28 NOTE — TELEPHONE ENCOUNTER
Refill Protocol Appointment Criteria  · Appointment scheduled in the past 6 months or in the next 3 months  Recent Outpatient Visits            1 month ago Anemia, unspecified type    [place] [facility], [place] Corrin Manner, MD    Office Vi

## 2017-11-21 NOTE — TELEPHONE ENCOUNTER
Signed Prescriptions Disp Refills    METFORMIN HCL 1000 MG Oral Tab 180 tablet 0      Sig: TAKE 1 TABLET BY MOUTH TWICE DAILY BEFORE MEALS        Authorizing Provider: Meliza Mishra        Ordering User: Valerie Vazquez           Refill approved per p

## 2017-12-06 ENCOUNTER — TELEPHONE (OUTPATIENT)
Dept: INTERNAL MEDICINE CLINIC | Facility: CLINIC | Age: 52
End: 2017-12-06

## 2017-12-06 NOTE — TELEPHONE ENCOUNTER
PA for TrueMetrix glucose test strips completed with EPS via CMM response time 24-72 hours KEY [id number].

## 2017-12-11 NOTE — PROGRESS NOTES
Breast Surgery New Patient Consultation    This is the first visit for this 47year old woman, referred by Dr. Monie Khalil, who presents for evaluation of abnormal breast imaging.     History of Present Illness:   Ms. Adriana Christine is a 47year old woman wh 100-25 MG Oral Tab TK 1 T PO D Disp: 90 tablet Rfl: 1   [DISCONTINUED] atorvastatin 40 MG Oral Tab Take 1 tablet (40 mg total) by mouth nightly.  Disp: 90 tablet Rfl: 1       Allergies:      Doxycycline                 Family History:   Family History   Pro urination, needing to get up at night to urinate, urinary hesitancy or retaining urine, painful urination, urinary incontinence, decreased urine stream, blood in the urine or vaginal/penile discharge.     Skin:    The patient denies rash, itching, skin lesi thrills. Breasts:  Her breasts are symmetrical.  Right breast: The skin, nipple ,and areola appear normal. There is no skin dimpling with movement of the pectoralis. There is no nipple retraction. No nipple discharge can be elicited.  The parenchyma is m overall likely benign nature of this I do not think this is mandatory at this time. Additionally, we discussed that sometimes MRI can be helpful for evaluating findings that are obscured on other modalities of imaging.   However, we discussed the high fals

## 2017-12-14 NOTE — TELEPHONE ENCOUNTER
True Metrix glucose test strips #100/30 days approved for 32 days effective 12/6/2017; patient notified via [employer].

## 2018-01-31 RX ORDER — LOSARTAN POTASSIUM AND HYDROCHLOROTHIAZIDE 25; 100 MG/1; MG/1
TABLET ORAL
Qty: 90 TABLET | Refills: 0 | Status: SHIPPED | OUTPATIENT
Start: 2018-01-31 | End: 2018-03-13

## 2018-01-31 NOTE — TELEPHONE ENCOUNTER
Hypertensive Medications: Refilled per protocol    Protocol Criteria:  · Appointment scheduled in the past 6 months or in the next 3 months  · BMP or CMP in the past 12 months  · Creatinine result < 2  Recent Outpatient Visits            4 months ago Anemi

## 2018-02-09 ENCOUNTER — HOSPITAL ENCOUNTER (EMERGENCY)
Facility: HOSPITAL | Age: 53
Discharge: HOME | End: 2018-02-09
Attending: EMERGENCY MEDICINE
Payer: MEDICAID

## 2018-02-09 VITALS
WEIGHT: 230 LBS | TEMPERATURE: 98 F | DIASTOLIC BLOOD PRESSURE: 81 MMHG | BODY MASS INDEX: 38.32 KG/M2 | HEIGHT: 65 IN | HEART RATE: 82 BPM | SYSTOLIC BLOOD PRESSURE: 127 MMHG | OXYGEN SATURATION: 99 % | RESPIRATION RATE: 20 BRPM

## 2018-02-09 DIAGNOSIS — S33.5XXA SPRAIN OF LOW BACK, INITIAL ENCOUNTER: Primary | ICD-10-CM

## 2018-02-09 PROCEDURE — 99283 EMERGENCY DEPT VISIT LOW MDM: CPT

## 2018-02-09 RX ORDER — CYCLOBENZAPRINE HCL 10 MG
10 TABLET ORAL 3 TIMES DAILY PRN
Qty: 20 TABLET | Refills: 0 | Status: SHIPPED | OUTPATIENT
Start: 2018-02-09 | End: 2018-02-16

## 2018-02-09 RX ORDER — IBUPROFEN 600 MG/1
600 TABLET ORAL EVERY 8 HOURS PRN
Qty: 30 TABLET | Refills: 0 | Status: SHIPPED | OUTPATIENT
Start: 2018-02-09 | End: 2018-02-19

## 2018-02-09 NOTE — ED PROVIDER NOTES
Patient Seen in: [facility] Emergency Department    History   Patient presents with:  Fall (musculoskeletal, neurologic)    Stated Complaint: fall    HPI    53-year-old female presents emergency department who complains of slipping and falling on th scleral icterus, mucous membranes are moist, there is no erythema or exudate in the posterior pharynx  Neck: Supple no JVD no lymphadenopathy no meningismus no carotid bruit  CV: Regular rate and rhythm no murmur rub  Respiratory: Clear to auscultation corrina 0

## 2018-02-09 NOTE — ED INITIAL ASSESSMENT (HPI)
Pt her for fall on ice. Pt fell on right knee and then on to back. Pt states right lower back pain with no radiation. Pt denies hitting head.

## 2018-02-12 ENCOUNTER — APPOINTMENT (OUTPATIENT)
Dept: GENERAL RADIOLOGY | Facility: HOSPITAL | Age: 53
End: 2018-02-12
Attending: EMERGENCY MEDICINE
Payer: MEDICAID

## 2018-02-12 ENCOUNTER — HOSPITAL ENCOUNTER (OUTPATIENT)
Age: 53
Discharge: ANOTHER HOSPITAL | End: 2018-02-12
Attending: FAMILY MEDICINE
Payer: MEDICAID

## 2018-02-12 ENCOUNTER — APPOINTMENT (OUTPATIENT)
Dept: CT IMAGING | Facility: HOSPITAL | Age: 53
End: 2018-02-12
Attending: EMERGENCY MEDICINE
Payer: MEDICAID

## 2018-02-12 ENCOUNTER — HOSPITAL ENCOUNTER (EMERGENCY)
Facility: HOSPITAL | Age: 53
Discharge: HOME | End: 2018-02-12
Attending: EMERGENCY MEDICINE
Payer: MEDICAID

## 2018-02-12 VITALS
HEIGHT: 65 IN | RESPIRATION RATE: 18 BRPM | WEIGHT: 230 LBS | TEMPERATURE: 98 F | HEART RATE: 96 BPM | SYSTOLIC BLOOD PRESSURE: 117 MMHG | DIASTOLIC BLOOD PRESSURE: 96 MMHG | OXYGEN SATURATION: 98 % | BODY MASS INDEX: 38.32 KG/M2

## 2018-02-12 VITALS
TEMPERATURE: 99 F | BODY MASS INDEX: 38.32 KG/M2 | WEIGHT: 230 LBS | DIASTOLIC BLOOD PRESSURE: 78 MMHG | HEIGHT: 65 IN | HEART RATE: 78 BPM | SYSTOLIC BLOOD PRESSURE: 135 MMHG | RESPIRATION RATE: 16 BRPM | OXYGEN SATURATION: 98 %

## 2018-02-12 DIAGNOSIS — S20.229A CONTUSION OF BACK, UNSPECIFIED LATERALITY, INITIAL ENCOUNTER: ICD-10-CM

## 2018-02-12 DIAGNOSIS — M54.50 ACUTE MIDLINE LOW BACK PAIN WITHOUT SCIATICA: ICD-10-CM

## 2018-02-12 DIAGNOSIS — R22.0 LEFT FACIAL SWELLING: ICD-10-CM

## 2018-02-12 DIAGNOSIS — J01.00 ACUTE MAXILLARY SINUSITIS, RECURRENCE NOT SPECIFIED: Primary | ICD-10-CM

## 2018-02-12 DIAGNOSIS — J02.9 PHARYNGITIS, UNSPECIFIED ETIOLOGY: Primary | ICD-10-CM

## 2018-02-12 PROCEDURE — 96374 THER/PROPH/DIAG INJ IV PUSH: CPT

## 2018-02-12 PROCEDURE — 99205 OFFICE O/P NEW HI 60 MIN: CPT

## 2018-02-12 PROCEDURE — 96375 TX/PRO/DX INJ NEW DRUG ADDON: CPT

## 2018-02-12 PROCEDURE — 99285 EMERGENCY DEPT VISIT HI MDM: CPT

## 2018-02-12 PROCEDURE — 96372 THER/PROPH/DIAG INJ SC/IM: CPT

## 2018-02-12 PROCEDURE — 72220 X-RAY EXAM SACRUM TAILBONE: CPT | Performed by: EMERGENCY MEDICINE

## 2018-02-12 PROCEDURE — 72100 X-RAY EXAM L-S SPINE 2/3 VWS: CPT | Performed by: EMERGENCY MEDICINE

## 2018-02-12 PROCEDURE — 99215 OFFICE O/P EST HI 40 MIN: CPT

## 2018-02-12 PROCEDURE — 70450 CT HEAD/BRAIN W/O DYE: CPT | Performed by: EMERGENCY MEDICINE

## 2018-02-12 PROCEDURE — 99284 EMERGENCY DEPT VISIT MOD MDM: CPT

## 2018-02-12 RX ORDER — AMOXICILLIN AND CLAVULANATE POTASSIUM 875; 125 MG/1; MG/1
1 TABLET, FILM COATED ORAL 2 TIMES DAILY
Qty: 20 TABLET | Refills: 0 | Status: SHIPPED | OUTPATIENT
Start: 2018-02-12 | End: 2018-02-22

## 2018-02-12 RX ORDER — DEXAMETHASONE SODIUM PHOSPHATE 4 MG/ML
10 VIAL (ML) INJECTION ONCE
Status: COMPLETED | OUTPATIENT
Start: 2018-02-12 | End: 2018-02-12

## 2018-02-12 RX ORDER — MORPHINE SULFATE 10 MG/ML
10 INJECTION, SOLUTION INTRAMUSCULAR; INTRAVENOUS ONCE
Status: COMPLETED | OUTPATIENT
Start: 2018-02-12 | End: 2018-02-12

## 2018-02-12 RX ORDER — MORPHINE SULFATE 2 MG/ML
4 INJECTION, SOLUTION INTRAMUSCULAR; INTRAVENOUS EVERY 30 MIN PRN
Status: DISCONTINUED | OUTPATIENT
Start: 2018-02-12 | End: 2018-02-12

## 2018-02-12 RX ORDER — PREDNISONE 20 MG/1
40 TABLET ORAL DAILY
Qty: 10 TABLET | Refills: 0 | Status: SHIPPED | OUTPATIENT
Start: 2018-02-12 | End: 2018-02-17

## 2018-02-12 RX ORDER — ONDANSETRON 4 MG/1
4 TABLET, ORALLY DISINTEGRATING ORAL ONCE
Status: COMPLETED | OUTPATIENT
Start: 2018-02-12 | End: 2018-02-12

## 2018-02-12 RX ORDER — HYDROCODONE BITARTRATE AND ACETAMINOPHEN 5; 325 MG/1; MG/1
1 TABLET ORAL ONCE
Status: COMPLETED | OUTPATIENT
Start: 2018-02-12 | End: 2018-02-12

## 2018-02-12 RX ORDER — HYDROCODONE BITARTRATE AND ACETAMINOPHEN 5; 325 MG/1; MG/1
1-2 TABLET ORAL EVERY 4 HOURS PRN
Qty: 20 TABLET | Refills: 0 | Status: SHIPPED | OUTPATIENT
Start: 2018-02-12 | End: 2018-02-19

## 2018-02-12 NOTE — ED INITIAL ASSESSMENT (HPI)
Pt arrived alert and responsive. Pt has multiple complaints. Pt c/o facial swelling, right flank pain, nasal congestion, pt states she slipped and fell on the ice on Friday. Pt was seen in the ed on Friday.  Pt not sure if she's having an allergic reaction

## 2018-02-12 NOTE — ED INITIAL ASSESSMENT (HPI)
fell on Friday, seen in ER sent home with flexeril and motrin seen for facial swelling L above eye , MD thinks sinusitis, need ct of sinuses and sacrum per family.

## 2018-02-12 NOTE — ED NOTES
Bedside report given to Juan [facility]. Pt resting on cart, daughter at bedside. Pt states would like to have IV started because she has not received any relief from IM medication. MD notified.

## 2018-02-12 NOTE — ED PROVIDER NOTES
Patient Seen in: [facility] Emergency Department    History   Patient presents with:  Cellulitis (integumentary, infectious)  Fall (musculoskeletal, neurologic)    Stated Complaint: facial swelling- sent from urgent care for CT of sinuses as well as Review of Systems    Positive for stated complaint: facial swelling- sent from urgent care for CT of sinuses as well as for evaluat*  Other systems are as noted in HPI. Constitutional and vital signs reviewed.       All other systems reviewed a calcifications. Left pelvic surgical clip. CONCLUSION:  No abnormality demonstrated in the sacrum and coccyx.     Dictated by: Tonya Mcgovern MD on 2/12/2018 at 16:11     Approved by: Tonya Mcgovern MD            Ct Brain Or Head ([id number])    Result Date: subluxation. Mild L4-5 disc space narrowing. Scattered small marginal osteophytes. Curvatures are within normal limits. Right upper quadrant surgical clips. Calcifications of the abdominal aorta without aneurysm.       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

## 2018-02-12 NOTE — ED NOTES
Rounded on pt who is resting comfortably in no distress pt updated on POC and eta for xray is 5-10 min

## 2018-02-13 NOTE — ED PROVIDER NOTES
Patient Seen in: [facility]    History   Patient presents with:  Back Pain (musculoskeletal)  Sinus Problem    Stated Complaint: facial swelling    HPI    50-year-old female presented with chief complaint of left-sided faci Vitals [02/12/18 1015]  BP: 117/96  Pulse: 96  Resp: 18  Temp: 98 °F (36.7 °C)  Temp src: Oral  SpO2: 98 %  O2 Device: None (Room air)    Current:/96   Pulse 96   Temp 98 °F (36.7 °C) (Oral)   Resp 18   Ht 165.1 cm (5' 5\")   Wt 104.3 kg   SpO2 98%

## 2018-03-13 ENCOUNTER — OFFICE VISIT (OUTPATIENT)
Dept: INTERNAL MEDICINE CLINIC | Facility: CLINIC | Age: 53
End: 2018-03-13

## 2018-03-13 ENCOUNTER — TELEPHONE (OUTPATIENT)
Dept: OTHER | Age: 53
End: 2018-03-13

## 2018-03-13 VITALS
RESPIRATION RATE: 20 BRPM | SYSTOLIC BLOOD PRESSURE: 135 MMHG | TEMPERATURE: 97 F | WEIGHT: 232 LBS | DIASTOLIC BLOOD PRESSURE: 78 MMHG | BODY MASS INDEX: 37.28 KG/M2 | HEIGHT: 66 IN | HEART RATE: 106 BPM

## 2018-03-13 DIAGNOSIS — M54.41 RIGHT-SIDED LOW BACK PAIN WITH RIGHT-SIDED SCIATICA, UNSPECIFIED CHRONICITY: ICD-10-CM

## 2018-03-13 DIAGNOSIS — R09.81 NASAL CONGESTION: ICD-10-CM

## 2018-03-13 DIAGNOSIS — E11.9 TYPE 2 DIABETES MELLITUS WITHOUT COMPLICATION, WITHOUT LONG-TERM CURRENT USE OF INSULIN (HCC): Primary | ICD-10-CM

## 2018-03-13 LAB — TEST STRIP LOT #: ABNORMAL NUMERIC

## 2018-03-13 PROCEDURE — 99214 OFFICE O/P EST MOD 30 MIN: CPT | Performed by: INTERNAL MEDICINE

## 2018-03-13 PROCEDURE — 99212 OFFICE O/P EST SF 10 MIN: CPT | Performed by: INTERNAL MEDICINE

## 2018-03-13 PROCEDURE — 36416 COLLJ CAPILLARY BLOOD SPEC: CPT | Performed by: INTERNAL MEDICINE

## 2018-03-13 PROCEDURE — 82962 GLUCOSE BLOOD TEST: CPT | Performed by: INTERNAL MEDICINE

## 2018-03-13 RX ORDER — CYCLOBENZAPRINE HCL 5 MG
5 TABLET ORAL NIGHTLY PRN
Qty: 20 TABLET | Refills: 0 | Status: SHIPPED | OUTPATIENT
Start: 2018-03-13 | End: 2018-04-19

## 2018-03-13 RX ORDER — LORATADINE 10 MG/1
10 TABLET ORAL DAILY
Qty: 30 TABLET | Refills: 1 | Status: SHIPPED | OUTPATIENT
Start: 2018-03-13 | End: 2018-04-19

## 2018-03-13 RX ORDER — ATORVASTATIN CALCIUM 40 MG/1
40 TABLET, FILM COATED ORAL NIGHTLY
Qty: 90 TABLET | Refills: 1 | Status: SHIPPED | OUTPATIENT
Start: 2018-03-13 | End: 2019-07-02

## 2018-03-13 RX ORDER — FLUTICASONE PROPIONATE 50 MCG
2 SPRAY, SUSPENSION (ML) NASAL DAILY
Qty: 1 BOTTLE | Refills: 0 | Status: SHIPPED | OUTPATIENT
Start: 2018-03-13 | End: 2018-04-17

## 2018-03-13 RX ORDER — LOSARTAN POTASSIUM AND HYDROCHLOROTHIAZIDE 25; 100 MG/1; MG/1
TABLET ORAL
Qty: 90 TABLET | Refills: 1 | Status: SHIPPED | OUTPATIENT
Start: 2018-03-13 | End: 2019-02-10

## 2018-03-13 RX ORDER — FLUTICASONE PROPIONATE 50 MCG
SPRAY, SUSPENSION (ML) NASAL
Qty: 1 INHALER | Refills: 0 | Status: SHIPPED | OUTPATIENT
Start: 2018-03-13 | End: 2018-04-12

## 2018-03-13 NOTE — TELEPHONE ENCOUNTER
Dr. Pradeep Mac: Patient states RX for muscle relaxer and tylenol pain med was not sent to pharmacy. Please confirm what RX she needs. LOV 3/13/18. Also she mentioned Renetta Huggins LPN was going to f/u on ENT information?

## 2018-03-13 NOTE — PROGRESS NOTES
HPI:    Patient ID: Vandana Ornelas is a 47year old female. Diabetes   She presents for her follow-up diabetic visit. She has type 2 diabetes mellitus.  Pertinent negatives for hypoglycemia include no confusion, dizziness, headaches, nervousness/anxiousne Musculoskeletal: Positive for back pain. R  Knee  ,left elbow  Ache    Skin: Negative for pallor. Neurological: Negative for dizziness, tingling, loss of consciousness, numbness and headaches. Psychiatric/Behavioral: Negative for confusion.  The Eyes: Right eye exhibits no discharge. Left eye exhibits no discharge. No scleral icterus. Neck: Neck supple. No JVD present. No thyromegaly present. Cardiovascular: Normal rate and normal heart sounds. No murmur heard.   Pulmonary/Chest: Effort norm Weight  Reduction , pt  educaiton    lower  Back  exercsie   Tylenol 500 mg  Tid   Flexeril  5  Mg  qhs  As needed       Ears , throat and sinuses  Looks  Good on  Exam   Flonase    Nasal spray 2 puffs  Qd  1-2  Weeks   And claritin 1  Tab  Plain   1 - 2

## 2018-03-13 NOTE — TELEPHONE ENCOUNTER
Tylenol 500 mg  Is  otc      Flexeril 5  Mg   Will send to pharmacy -  Pt    Does not need  ENT sinuses  Look  Good  -can try Claritin  And nasal spray - for 1-2  Weeks   - will  Send to  Pharmacy    F/u in   2  -3 weeks

## 2018-03-13 NOTE — TELEPHONE ENCOUNTER
Please advise to the communication below. Thank you. Brain Sexton Please respond to pool: EM IM LMB LPN/CMA

## 2018-04-10 ENCOUNTER — LAB ENCOUNTER (OUTPATIENT)
Dept: LAB | Age: 53
End: 2018-04-10
Attending: INTERNAL MEDICINE
Payer: MEDICAID

## 2018-04-10 DIAGNOSIS — E11.9 TYPE 2 DIABETES MELLITUS WITHOUT COMPLICATION, WITHOUT LONG-TERM CURRENT USE OF INSULIN (HCC): ICD-10-CM

## 2018-04-10 PROCEDURE — 80061 LIPID PANEL: CPT

## 2018-04-10 PROCEDURE — 84443 ASSAY THYROID STIM HORMONE: CPT

## 2018-04-10 PROCEDURE — 36415 COLL VENOUS BLD VENIPUNCTURE: CPT

## 2018-04-10 PROCEDURE — 85025 COMPLETE CBC W/AUTO DIFF WBC: CPT

## 2018-04-10 PROCEDURE — 80053 COMPREHEN METABOLIC PANEL: CPT

## 2018-04-10 PROCEDURE — 83036 HEMOGLOBIN GLYCOSYLATED A1C: CPT

## 2018-04-12 ENCOUNTER — HOSPITAL ENCOUNTER (OUTPATIENT)
Dept: GENERAL RADIOLOGY | Facility: HOSPITAL | Age: 53
Discharge: HOME | End: 2018-04-12
Attending: PHYSICAL MEDICINE & REHABILITATION
Payer: MEDICAID

## 2018-04-12 ENCOUNTER — APPOINTMENT (OUTPATIENT)
Dept: LAB | Facility: HOSPITAL | Age: 53
End: 2018-04-12
Attending: SURGERY
Payer: MEDICAID

## 2018-04-12 ENCOUNTER — HOSPITAL ENCOUNTER (OUTPATIENT)
Dept: MAMMOGRAPHY | Facility: HOSPITAL | Age: 53
Discharge: HOME | End: 2018-04-12
Attending: SURGERY
Payer: MEDICAID

## 2018-04-12 ENCOUNTER — OFFICE VISIT (OUTPATIENT)
Dept: NEUROLOGY | Facility: CLINIC | Age: 53
End: 2018-04-12

## 2018-04-12 VITALS
RESPIRATION RATE: 16 BRPM | SYSTOLIC BLOOD PRESSURE: 142 MMHG | BODY MASS INDEX: 37.28 KG/M2 | HEIGHT: 66 IN | HEART RATE: 100 BPM | DIASTOLIC BLOOD PRESSURE: 96 MMHG | WEIGHT: 232 LBS

## 2018-04-12 DIAGNOSIS — E11.9 TYPE 2 DIABETES MELLITUS WITHOUT COMPLICATION, WITHOUT LONG-TERM CURRENT USE OF INSULIN (HCC): ICD-10-CM

## 2018-04-12 DIAGNOSIS — R92.8 ABNORMAL MAMMOGRAM OF RIGHT BREAST: ICD-10-CM

## 2018-04-12 DIAGNOSIS — M79.641 HAND PAIN, RIGHT: ICD-10-CM

## 2018-04-12 DIAGNOSIS — M79.641 HAND PAIN, RIGHT: Primary | ICD-10-CM

## 2018-04-12 PROCEDURE — 77065 DX MAMMO INCL CAD UNI: CPT | Performed by: SURGERY

## 2018-04-12 PROCEDURE — 73130 X-RAY EXAM OF HAND: CPT | Performed by: PHYSICAL MEDICINE & REHABILITATION

## 2018-04-12 PROCEDURE — 99204 OFFICE O/P NEW MOD 45 MIN: CPT | Performed by: PHYSICAL MEDICINE & REHABILITATION

## 2018-04-12 PROCEDURE — 82043 UR ALBUMIN QUANTITATIVE: CPT

## 2018-04-12 PROCEDURE — 81003 URINALYSIS AUTO W/O SCOPE: CPT

## 2018-04-12 PROCEDURE — 82570 ASSAY OF URINE CREATININE: CPT

## 2018-04-12 RX ORDER — IBUPROFEN 600 MG/1
600 TABLET ORAL EVERY 6 HOURS PRN
COMMUNITY
End: 2019-12-13

## 2018-04-12 NOTE — H&P
[facility] [facility] [address], [place]    History and Physical    Suzan Terrell Patient Status:  No patient class for patient encounter    1965 MRN [id number]   [facility] [place] [address], sitting to standing. She does have a similar stiffness in other parts of her body, but feels the most in the low back. She continues to feel right thumb and second finger pain described as a discomfort.   States that she has difficulty with fine movements rash.   Neurological: Positive for weakness. Negative for numbness. All other systems reviewed and are negative. Physical Exam:   Vital Signs:  Blood pressure 142/96, pulse 100, resp. rate 16, height 66\", weight 232 lb, currently breastfeeding. Friday. FINDINGS:    No fracture or subluxation. Mild L4-5 disc space narrowing. Scattered small marginal osteophytes. Curvatures are within normal limits. Right upper quadrant surgical clips.   Calcifications of the abdominal aorta without ane

## 2018-04-12 NOTE — PATIENT INSTRUCTIONS
1) Use counterforce brace for tennis elbow  2) Use a nighttime splint for the right wrist for carpal tunnel syndrome; please have the Xrays of the right hand done. 3) Follow up in 6-8 weeks as needed.  We can consider injection for tennis elbow and I can r prescription as our physicians rotate between multiple offices and procedure days in the hospitals. · Patient must present photo ID at time of .  If a designated family member will be picking up prescription, office must be given name of individual

## 2018-04-13 ENCOUNTER — TELEPHONE (OUTPATIENT)
Dept: INTERNAL MEDICINE CLINIC | Facility: CLINIC | Age: 53
End: 2018-04-13

## 2018-04-13 NOTE — TELEPHONE ENCOUNTER
Pt is requesting a callback from [facility] Dr. CERRATOH[facility] RN and Pt did not go into major details.

## 2018-04-14 NOTE — TELEPHONE ENCOUNTER
Pt was contacted and was asking for the results of her test. Pt was informed that they have not been resulted but will give her a call back when they have been seen and resulted. Understanding was voiced.

## 2018-04-16 ENCOUNTER — TELEPHONE (OUTPATIENT)
Dept: NEUROLOGY | Facility: CLINIC | Age: 53
End: 2018-04-16

## 2018-04-16 DIAGNOSIS — M77.10 LATERAL EPICONDYLITIS, UNSPECIFIED LATERALITY: ICD-10-CM

## 2018-04-16 DIAGNOSIS — G89.29 CHRONIC BILATERAL LOW BACK PAIN WITH BILATERAL SCIATICA: ICD-10-CM

## 2018-04-16 DIAGNOSIS — M54.41 CHRONIC BILATERAL LOW BACK PAIN WITH BILATERAL SCIATICA: ICD-10-CM

## 2018-04-16 DIAGNOSIS — M54.50 CHRONIC BILATERAL LOW BACK PAIN WITHOUT SCIATICA: Primary | ICD-10-CM

## 2018-04-16 DIAGNOSIS — G56.00 CARPAL TUNNEL SYNDROME, UNSPECIFIED LATERALITY: Primary | ICD-10-CM

## 2018-04-16 DIAGNOSIS — M54.42 CHRONIC BILATERAL LOW BACK PAIN WITH BILATERAL SCIATICA: ICD-10-CM

## 2018-04-16 DIAGNOSIS — G89.29 CHRONIC BILATERAL LOW BACK PAIN WITHOUT SCIATICA: Primary | ICD-10-CM

## 2018-04-16 RX ORDER — METHYLPREDNISOLONE 4 MG/1
TABLET ORAL
Qty: 1 PACKAGE | Refills: 0 | Status: SHIPPED | OUTPATIENT
Start: 2018-04-16 | End: 2018-09-19 | Stop reason: ALTCHOICE

## 2018-04-16 NOTE — PROGRESS NOTES
Please call patient with blood test results. Kidney and liver function are normal,   mild anemia  Present   Cholesterol is elevated  -  Recommend  Low fat  And sugar diet   sugar mildly is  Elevated   A1  C   7.2 -    Thyroid hormone is normal as well.

## 2018-04-16 NOTE — TELEPHONE ENCOUNTER
Patient asked if there's anything she can do like possible body imaging. She states she was in complete discomfort on Saturday and Sunday due to the rain.  She mentions she can not deal with the pain all over and wants to know if there's anything she can do

## 2018-04-16 NOTE — TELEPHONE ENCOUNTER
[address] [facility] [phone] for Carpal tunnel wrist splint - right carpal tunnel syndrome counterforce brace - left lateral epicondylitis CPT codes  /   t/t Paulette Maria, stated that No Authorization needed with Ref # [id number], will send

## 2018-04-17 NOTE — TELEPHONE ENCOUNTER
Called patient to check the status of patients wrist splits, patient states that she is going today, will give them a call first

## 2018-04-18 RX ORDER — FLUTICASONE PROPIONATE 50 MCG
SPRAY, SUSPENSION (ML) NASAL
Qty: 3 BOTTLE | Refills: 0 | Status: SHIPPED | OUTPATIENT
Start: 2018-04-18 | End: 2018-07-11

## 2018-04-19 NOTE — TELEPHONE ENCOUNTER
Refill Protocol Appointment Criteria  · Appointment scheduled in the past 12 months or in the next 3 months  Recent Outpatient Visits            6 days ago Hand pain, right    [address], [address], [address]

## 2018-04-21 NOTE — TELEPHONE ENCOUNTER
From: Latanya Onofre  Sent: 4/19/2018 12:44 AM CDT  Subject: Medication Renewal Request    Iona Streeter.  Ludmila Markus would like a refill of the following medications:     loratadine 10 MG Oral Tab Willis Craig MD]     Cyclobenzaprine HCl 5 MG Oral Tab [Chavez Vu

## 2018-04-21 NOTE — TELEPHONE ENCOUNTER
LOV: 4/10/18 Last Rx: 3/13/18    No protocol     Please advise in regards to refill request. Thank You          Refill Protocol Appointment Criteria: Refilled per protocol    · Appointment scheduled in the past 12 months or in the next 3 months  Recent Out

## 2018-04-23 RX ORDER — LORATADINE 10 MG/1
10 TABLET ORAL DAILY
Qty: 30 TABLET | Refills: 1 | Status: SHIPPED | OUTPATIENT
Start: 2018-04-23 | End: 2019-04-23

## 2018-04-23 RX ORDER — CYCLOBENZAPRINE HCL 5 MG
5 TABLET ORAL NIGHTLY PRN
Qty: 20 TABLET | Refills: 0 | Status: SHIPPED | OUTPATIENT
Start: 2018-04-23 | End: 2019-08-30

## 2018-04-30 ENCOUNTER — TELEPHONE (OUTPATIENT)
Dept: INTERNAL MEDICINE CLINIC | Facility: CLINIC | Age: 53
End: 2018-04-30

## 2018-04-30 NOTE — TELEPHONE ENCOUNTER
Patient calling to see if still needed to keep upcoming appointment due to current test results.    Please advise

## 2018-05-01 NOTE — TELEPHONE ENCOUNTER
Pt has general questions regarding her labs and recent pain after fall. Pt was encouraged to schedule a f/u appt to discuss issues that she is having. Understanding was voiced.

## 2018-05-01 NOTE — TELEPHONE ENCOUNTER
Message was relayed and left on voicemail. Today's appt was cancelled due to not notifying the pt sooner before her appt.

## 2018-05-04 NOTE — TELEPHONE ENCOUNTER
Spoke to patient. She states that she received braces from [facility] for right and left arm. She was wearing them and doing well with them, stating she was pain free while she wore them. She states that this week she noticed \"air bubbles\" in left arm pit.  She

## 2018-05-07 NOTE — TELEPHONE ENCOUNTER
Alban and Julio can make adjustments to her braces; she should be seen by them and tell them what's going on. It sounds like the braces need to be adjusted.

## 2018-05-11 ENCOUNTER — TELEPHONE (OUTPATIENT)
Dept: INTERNAL MEDICINE CLINIC | Facility: CLINIC | Age: 53
End: 2018-05-11

## 2018-05-11 NOTE — TELEPHONE ENCOUNTER
PA for True Metrix blood glucose test strips completed with [email or web address] via CMM response time 3-5 business days KEY [id number].

## 2018-05-31 ENCOUNTER — APPOINTMENT (OUTPATIENT)
Dept: GENERAL RADIOLOGY | Age: 53
End: 2018-05-31
Attending: NURSE PRACTITIONER
Payer: MEDICAID

## 2018-05-31 ENCOUNTER — HOSPITAL ENCOUNTER (OUTPATIENT)
Age: 53
Discharge: HOME | End: 2018-05-31
Payer: MEDICAID

## 2018-05-31 VITALS
TEMPERATURE: 99 F | OXYGEN SATURATION: 97 % | BODY MASS INDEX: 38.76 KG/M2 | HEART RATE: 100 BPM | HEIGHT: 66 IN | RESPIRATION RATE: 20 BRPM | WEIGHT: 241.19 LBS | DIASTOLIC BLOOD PRESSURE: 84 MMHG | SYSTOLIC BLOOD PRESSURE: 141 MMHG

## 2018-05-31 DIAGNOSIS — Z91.81 HISTORY OF FALL: Primary | ICD-10-CM

## 2018-05-31 DIAGNOSIS — S93.402A MILD SPRAIN OF LEFT ANKLE, INITIAL ENCOUNTER: ICD-10-CM

## 2018-05-31 DIAGNOSIS — S46.912A STRAIN OF LEFT ELBOW, INITIAL ENCOUNTER: ICD-10-CM

## 2018-05-31 PROCEDURE — 73080 X-RAY EXAM OF ELBOW: CPT | Performed by: NURSE PRACTITIONER

## 2018-05-31 PROCEDURE — 73610 X-RAY EXAM OF ANKLE: CPT | Performed by: NURSE PRACTITIONER

## 2018-05-31 PROCEDURE — 99214 OFFICE O/P EST MOD 30 MIN: CPT

## 2018-05-31 NOTE — ED INITIAL ASSESSMENT (HPI)
Rajat Irving MD  Signing Provider: Rajat Irving MD    EM/luis ([id number])  DD: 01/02/2018 12:37:59 TD: 01/02/2018 15:41:51    Copy Sent To:    PATIENT STATES SHE FELL ON 2/9/18. STATES SHE HAD A RIGHT WRIST X RAY HOWEVER SHE STATES HER LEFT ELBOW HAD BEEN PAINFUL SINCE THE FALL AND WAS NOT X RAYED. ALSO C/O LEFT ANKLE PAIN.   PATIENT STATES SHE NOTED SOME SWELLING TO HER LEFT FOOT AFTER EATING S

## 2018-05-31 NOTE — ED PROVIDER NOTES
Patient presents with:  Fall (musculoskeletal, neurologic)      HPI:     Jen James is a 47year old female who presents today with a chief complaint of pain in the lateral ankle and left elbow.   The patient states she had a fall approximately 3 months Exercise Yes     Social History Narrative    The patient does not use an assistive device. .      The patient does live in a home with stairs. ROS:   Positive for stated complaint: history of fall, left ankle pain, left elbow pain.   All other Indication / Reason for Exam?          Answer: pain/injury      XR ANKLE (MIN 3 VIEWS), LEFT (CPT=73610) Once          Order Comments:              Arm Pain PATIENT STATES SHE FELL ON 2/9/18.   STATES SHE HAD A RIGHT WRIST X RAY               HOWEVER SHE ST pain.    Diagnosis:    ICD-10-CM    1. History of fall Z91.81    2. Strain of left elbow, initial encounter S56.912A    3. Mild sprain of left ankle, initial encounter S93.402A        All results reviewed and discussed with patient.   See AVS for detailed d

## 2018-06-14 ENCOUNTER — HOSPITAL ENCOUNTER (EMERGENCY)
Facility: HOSPITAL | Age: 53
Discharge: HOME | End: 2018-06-14
Payer: MEDICAID

## 2018-06-14 VITALS
BODY MASS INDEX: 36.65 KG/M2 | OXYGEN SATURATION: 99 % | HEIGHT: 65 IN | RESPIRATION RATE: 18 BRPM | SYSTOLIC BLOOD PRESSURE: 106 MMHG | HEART RATE: 86 BPM | DIASTOLIC BLOOD PRESSURE: 80 MMHG | WEIGHT: 220 LBS | TEMPERATURE: 97 F

## 2018-06-14 DIAGNOSIS — A09 INFECTIOUS ENTERITIS, UNSPECIFIED INFECTIOUS AGENT: Primary | ICD-10-CM

## 2018-06-14 PROCEDURE — 81001 URINALYSIS AUTO W/SCOPE: CPT | Performed by: NURSE PRACTITIONER

## 2018-06-14 PROCEDURE — 96374 THER/PROPH/DIAG INJ IV PUSH: CPT

## 2018-06-14 PROCEDURE — 85025 COMPLETE CBC W/AUTO DIFF WBC: CPT | Performed by: NURSE PRACTITIONER

## 2018-06-14 PROCEDURE — 96361 HYDRATE IV INFUSION ADD-ON: CPT

## 2018-06-14 PROCEDURE — 99284 EMERGENCY DEPT VISIT MOD MDM: CPT

## 2018-06-14 PROCEDURE — 80048 BASIC METABOLIC PNL TOTAL CA: CPT | Performed by: NURSE PRACTITIONER

## 2018-06-14 PROCEDURE — 80076 HEPATIC FUNCTION PANEL: CPT | Performed by: NURSE PRACTITIONER

## 2018-06-14 PROCEDURE — 83690 ASSAY OF LIPASE: CPT | Performed by: NURSE PRACTITIONER

## 2018-06-14 RX ORDER — ONDANSETRON 2 MG/ML
4 INJECTION INTRAMUSCULAR; INTRAVENOUS ONCE
Status: COMPLETED | OUTPATIENT
Start: 2018-06-14 | End: 2018-06-14

## 2018-06-14 RX ORDER — ONDANSETRON 4 MG/1
4 TABLET, ORALLY DISINTEGRATING ORAL EVERY 6 HOURS PRN
Qty: 15 TABLET | Refills: 0 | Status: SHIPPED | OUTPATIENT
Start: 2018-06-14 | End: 2018-06-21

## 2018-06-14 NOTE — ED NOTES
Pt to ER with c/o nausea without vomiting and diarrhea since Sunday. Pt states average about 5-6 stools daily. +green in color per patient. Pt denies fever at home but feels \"warm\" at times.  Pt states this is possible due to spoiled food she ate on [address] [place]

## 2018-06-14 NOTE — ED PROVIDER NOTES
Patient Seen in: [facility] AND [facility] Emergency Department    History   CC: diarrhea  HPI: Simone Vargasy 47year old female w/ hx DMII, anemia, HTN, hyperlipidemia who presents to the ER c/o diarrhea 2-5x daily x4 days.  States she thought she might have eat (TIFFANIE CONTOUR NEXT TEST) In Vitro Strip,  Test blood glucose three times a daily. Blood Glucose Monitoring Suppl (TIFFANIE CONTOUR NEXT MONITOR) w/Device Does not apply Kit,  1 Application by Does not apply route 3 (three) times daily.    loratadine 10 MG O unlabored, good aeration with equal, even expansion bilaterally   CV - RRR  GI - Appears round and flat, BS +x4 quadrants, no tenderness/guarding with palpation, - rebound tenderness, - McBurneys, - Rovsings.   No reproducible pain on exam.  Abdomen is soft results to the patient's primary care provider Dr. Luis Hernandez written. Instructions for collection given by primary RN. General diet/hydration/abdominal pain discharge instructions reviewed with patient as well as follow-up and return precautions.   Veronique

## 2018-06-19 ENCOUNTER — TELEPHONE (OUTPATIENT)
Dept: NEUROLOGY | Facility: CLINIC | Age: 53
End: 2018-06-19

## 2018-06-19 NOTE — TELEPHONE ENCOUNTER
Spoke with patient, states she is still having severe left elbow pain, states the brace made her break out in blisters.  Rated pain a 10/10 when touches it, increased pain when she touches the elbow, also states she is wearing the wrist splint for carpal tu

## 2018-07-11 RX ORDER — FLUTICASONE PROPIONATE 50 MCG
SPRAY, SUSPENSION (ML) NASAL
Qty: 3 BOTTLE | Refills: 0 | Status: SHIPPED | OUTPATIENT
Start: 2018-07-11 | End: 2018-09-03

## 2018-07-12 NOTE — TELEPHONE ENCOUNTER
Refill Protocol Appointment Criteria  · Appointment scheduled in the past 12 months or in the next 3 months  Recent Outpatient Visits            3 months ago Hand pain, right    TIMA Peters, [address], [address],

## 2018-07-15 ENCOUNTER — APPOINTMENT (OUTPATIENT)
Dept: GENERAL RADIOLOGY | Facility: HOSPITAL | Age: 53
End: 2018-07-15
Payer: MEDICAID

## 2018-07-15 ENCOUNTER — HOSPITAL ENCOUNTER (EMERGENCY)
Facility: HOSPITAL | Age: 53
Discharge: HOME | End: 2018-07-15
Payer: MEDICAID

## 2018-07-15 VITALS
DIASTOLIC BLOOD PRESSURE: 84 MMHG | TEMPERATURE: 98 F | RESPIRATION RATE: 18 BRPM | BODY MASS INDEX: 36.73 KG/M2 | WEIGHT: 220.44 LBS | HEART RATE: 97 BPM | HEIGHT: 65 IN | SYSTOLIC BLOOD PRESSURE: 132 MMHG | OXYGEN SATURATION: 98 %

## 2018-07-15 DIAGNOSIS — S60.222A CONTUSION OF LEFT HAND, INITIAL ENCOUNTER: Primary | ICD-10-CM

## 2018-07-15 PROCEDURE — 73130 X-RAY EXAM OF HAND: CPT

## 2018-07-15 PROCEDURE — 99283 EMERGENCY DEPT VISIT LOW MDM: CPT

## 2018-07-15 RX ORDER — IBUPROFEN 600 MG/1
600 TABLET ORAL ONCE
Status: COMPLETED | OUTPATIENT
Start: 2018-07-15 | End: 2018-07-15

## 2018-07-15 RX ORDER — IBUPROFEN 600 MG/1
600 TABLET ORAL EVERY 6 HOURS PRN
Qty: 30 TABLET | Refills: 0 | Status: SHIPPED | OUTPATIENT
Start: 2018-07-15 | End: 2018-07-22

## 2018-07-15 NOTE — ED PROVIDER NOTES
Patient Seen in: [facility] Emergency Department    History   Patient presents with:  Hand Injury    Stated Complaint: Left hand injury    Patient presents into the emergency room for evaluation of a left hand injury.   Patient states on Friday she 132/84   Pulse 97   Temp 97.7 °F (36.5 °C) (Temporal)   Resp 18   Ht 165.1 cm (5' 5\")   Wt 100 kg   SpO2 98%   BMI 36.69 kg/m²         Physical Exam   Constitutional: She appears well-developed and well-nourished. No distress.    Musculoskeletal:   Focused

## 2018-07-25 NOTE — TELEPHONE ENCOUNTER
Spoke to patient who states the brace she received made her break out in blisters. Patient states she has not worn the brace in month due to it giving her blisters.  She also mentioned that it appeared to her that the brace from [address] [place] [address] appeared t

## 2018-07-25 NOTE — TELEPHONE ENCOUNTER
Pt calling back stating that she never heard back from our office about the brace she was ordered to wear.  Pt states that she stopped wearing the brace because it was making pain worse but she spoke with [employer] and was advised that we could call them and see

## 2018-07-25 NOTE — TELEPHONE ENCOUNTER
She should come see me and bring her braces with. I need to re-evaluate her.  If she does not want to come in other options are to buy them over the counter at stores like [employer], [address] [place] [address], [address] [place], [place] [address] , or [employer], or do to occupational therapy where they sometime

## 2018-07-25 NOTE — TELEPHONE ENCOUNTER
Notified patient of Dr. May Miner recent suggestion for the patient to come in this Friday 07/27/18 at 9 am for a re-evaluation and to bring in her braces.  After giving her the rest of Dr. May Miner suggestion , pt became upset and I apologized to her Bettye Valentin

## 2018-09-03 RX ORDER — FLUTICASONE PROPIONATE 50 MCG
SPRAY, SUSPENSION (ML) NASAL
Qty: 3 INHALER | Refills: 0 | Status: SHIPPED | OUTPATIENT
Start: 2018-09-03 | End: 2018-12-02

## 2018-09-04 NOTE — TELEPHONE ENCOUNTER
Refill Protocol Appointment Criteria  · Appointment scheduled in the past 12 months or in the next 3 months  Recent Outpatient Visits            4 months ago Hand pain, right    TIMA Peters, [address], [address],

## 2018-09-19 ENCOUNTER — OFFICE VISIT (OUTPATIENT)
Dept: INTERNAL MEDICINE CLINIC | Facility: CLINIC | Age: 53
End: 2018-09-19
Payer: MEDICAID

## 2018-09-19 VITALS
SYSTOLIC BLOOD PRESSURE: 106 MMHG | HEART RATE: 94 BPM | HEIGHT: 66 IN | BODY MASS INDEX: 37.67 KG/M2 | WEIGHT: 234.38 LBS | DIASTOLIC BLOOD PRESSURE: 79 MMHG | RESPIRATION RATE: 20 BRPM | TEMPERATURE: 99 F

## 2018-09-19 DIAGNOSIS — E11.9 TYPE 2 DIABETES MELLITUS WITHOUT COMPLICATION, WITHOUT LONG-TERM CURRENT USE OF INSULIN (HCC): ICD-10-CM

## 2018-09-19 DIAGNOSIS — D64.9 ANEMIA, UNSPECIFIED TYPE: Primary | ICD-10-CM

## 2018-09-19 DIAGNOSIS — E78.00 PURE HYPERCHOLESTEROLEMIA: ICD-10-CM

## 2018-09-19 DIAGNOSIS — I10 ESSENTIAL HYPERTENSION: ICD-10-CM

## 2018-09-19 DIAGNOSIS — H26.9 CATARACT OF RIGHT EYE, UNSPECIFIED CATARACT TYPE: ICD-10-CM

## 2018-09-19 PROCEDURE — 99214 OFFICE O/P EST MOD 30 MIN: CPT | Performed by: INTERNAL MEDICINE

## 2018-09-19 PROCEDURE — 99212 OFFICE O/P EST SF 10 MIN: CPT | Performed by: INTERNAL MEDICINE

## 2018-09-19 NOTE — PROGRESS NOTES
HPI:    Patient ID: Kaleigh Cordoba is a 46year old female.   Patient presents with:  Diabetes: Pt state that she is f/u with her diabetes and results from labs dated 06/18      Imm/Inj: flu shot      Diabetes   She presents for her follow-up (pt  state  fee AS NEEDED Disp: 3 Inhaler Rfl: 0   Glucose Blood (TIFFANIE CONTOUR NEXT TEST) In Vitro Strip Test blood glucose three times a daily.  Disp: 100 strip Rfl: 11   Blood Glucose Monitoring Suppl (TIFFANIE CONTOUR NEXT MONITOR) w/Device Does not apply Kit 1 Applicatio tenderness and no frontal sinus tenderness. Mouth/Throat: Uvula is midline. No oropharyngeal exudate or posterior oropharyngeal erythema. Eyes: Right eye exhibits no discharge. Left eye exhibits no discharge. No scleral icterus. Neck: Neck supple.  No Maintain ideal weight/BMI   Regular walking/exercise as tolerated   Track and record blood pressure and heart rate at home   The side effects of medication discussed with patient   Patient verbalizes understanding and compliance  Stable cpm       Pure hy

## 2018-09-20 ENCOUNTER — APPOINTMENT (OUTPATIENT)
Dept: OTHER | Facility: HOSPITAL | Age: 53
End: 2018-09-20
Attending: FAMILY MEDICINE

## 2018-09-21 ENCOUNTER — APPOINTMENT (OUTPATIENT)
Dept: OTHER | Facility: HOSPITAL | Age: 53
End: 2018-09-21
Attending: EMERGENCY MEDICINE

## 2018-09-24 ENCOUNTER — LAB ENCOUNTER (OUTPATIENT)
Dept: LAB | Facility: HOSPITAL | Age: 53
End: 2018-09-24
Attending: INTERNAL MEDICINE
Payer: MEDICAID

## 2018-09-24 DIAGNOSIS — E11.9 TYPE 2 DIABETES MELLITUS WITHOUT COMPLICATION, WITHOUT LONG-TERM CURRENT USE OF INSULIN (HCC): ICD-10-CM

## 2018-09-24 DIAGNOSIS — D64.9 ANEMIA, UNSPECIFIED TYPE: ICD-10-CM

## 2018-09-24 LAB
ALBUMIN SERPL BCP-MCNC: 3.8 G/DL (ref 3.5–4.8)
ALBUMIN/GLOB SERPL: 1 {RATIO} (ref 1–2)
ALP SERPL-CCNC: 71 U/L (ref 32–100)
ALT SERPL-CCNC: 21 U/L (ref 14–54)
ANION GAP SERPL CALC-SCNC: 8 MMOL/L (ref 0–18)
AST SERPL-CCNC: 20 U/L (ref 15–41)
BASOPHILS # BLD: 0.1 K/UL (ref 0–0.2)
BASOPHILS NFR BLD: 1 %
BILIRUB SERPL-MCNC: 0.7 MG/DL (ref 0.3–1.2)
BUN SERPL-MCNC: 13 MG/DL (ref 8–20)
BUN/CREAT SERPL: 14.6 (ref 10–20)
CALCIUM SERPL-MCNC: 9.2 MG/DL (ref 8.5–10.5)
CHLORIDE SERPL-SCNC: 100 MMOL/L (ref 95–110)
CO2 SERPL-SCNC: 27 MMOL/L (ref 22–32)
CREAT SERPL-MCNC: 0.89 MG/DL (ref 0.5–1.5)
CREAT UR-MCNC: 187.3 MG/DL
EOSINOPHIL # BLD: 0.1 K/UL (ref 0–0.7)
EOSINOPHIL NFR BLD: 2 %
ERYTHROCYTE [DISTWIDTH] IN BLOOD BY AUTOMATED COUNT: 15.7 % (ref 11–15)
FERRITIN SERPL IA-MCNC: 73 NG/ML (ref 11–307)
FOLATE SERPL-MCNC: 16.7 NG/ML
GLOBULIN PLAS-MCNC: 3.7 G/DL (ref 2.5–3.7)
GLUCOSE SERPL-MCNC: 148 MG/DL (ref 70–99)
HBA1C MFR BLD: 6.4 % (ref 4–6)
HCT VFR BLD AUTO: 37.4 % (ref 35–48)
HGB BLD-MCNC: 12.4 G/DL (ref 12–16)
LYMPHOCYTES # BLD: 2.8 K/UL (ref 1–4)
LYMPHOCYTES NFR BLD: 40 %
MCH RBC QN AUTO: 25.7 PG (ref 27–32)
MCHC RBC AUTO-ENTMCNC: 33.1 G/DL (ref 32–37)
MCV RBC AUTO: 77.4 FL (ref 80–100)
MICROALBUMIN UR-MCNC: 0.7 MG/DL (ref 0–1.8)
MICROALBUMIN/CREAT UR: 3.7 MG/G{CREAT} (ref 0–20)
MONOCYTES # BLD: 0.4 K/UL (ref 0–1)
MONOCYTES NFR BLD: 6 %
NEUTROPHILS # BLD AUTO: 3.6 K/UL (ref 1.8–7.7)
NEUTROPHILS NFR BLD: 52 %
OSMOLALITY UR CALC.SUM OF ELEC: 283 MOSM/KG (ref 275–295)
PATIENT FASTING: YES
PLATELET # BLD AUTO: 375 K/UL (ref 140–400)
PMV BLD AUTO: 8.6 FL (ref 7.4–10.3)
POTASSIUM SERPL-SCNC: 3.5 MMOL/L (ref 3.3–5.1)
PROT SERPL-MCNC: 7.5 G/DL (ref 5.9–8.4)
RBC # BLD AUTO: 4.84 M/UL (ref 3.7–5.4)
SODIUM SERPL-SCNC: 135 MMOL/L (ref 136–144)
VIT B12 SERPL-MCNC: 67 PG/ML (ref 181–914)
WBC # BLD AUTO: 7 K/UL (ref 4–11)

## 2018-09-24 PROCEDURE — 82570 ASSAY OF URINE CREATININE: CPT

## 2018-09-24 PROCEDURE — 83021 HEMOGLOBIN CHROMOTOGRAPHY: CPT

## 2018-09-24 PROCEDURE — 36415 COLL VENOUS BLD VENIPUNCTURE: CPT

## 2018-09-24 PROCEDURE — 83036 HEMOGLOBIN GLYCOSYLATED A1C: CPT

## 2018-09-24 PROCEDURE — 82746 ASSAY OF FOLIC ACID SERUM: CPT

## 2018-09-24 PROCEDURE — 82728 ASSAY OF FERRITIN: CPT

## 2018-09-24 PROCEDURE — 82607 VITAMIN B-12: CPT

## 2018-09-24 PROCEDURE — 85660 RBC SICKLE CELL TEST: CPT

## 2018-09-24 PROCEDURE — 82043 UR ALBUMIN QUANTITATIVE: CPT

## 2018-09-24 PROCEDURE — 80053 COMPREHEN METABOLIC PANEL: CPT

## 2018-09-24 PROCEDURE — 85025 COMPLETE CBC W/AUTO DIFF WBC: CPT

## 2018-09-24 PROCEDURE — 83020 HEMOGLOBIN ELECTROPHORESIS: CPT

## 2018-09-26 LAB
HGB A2 MFR BLD: 2.7 % (ref 1.5–3.5)
HGB PNL BLD ELPH: 56.9 % (ref 95.5–100)
HGB S BLD QL SOLY: POSITIVE
HGB S MFR BLD: 40.4 %

## 2018-10-05 ENCOUNTER — NURSE ONLY (OUTPATIENT)
Dept: INTERNAL MEDICINE CLINIC | Facility: CLINIC | Age: 53
End: 2018-10-05
Payer: MEDICAID

## 2018-10-05 DIAGNOSIS — E53.8 B12 DEFICIENCY: Primary | ICD-10-CM

## 2018-10-05 PROCEDURE — 96372 THER/PROPH/DIAG INJ SC/IM: CPT | Performed by: INTERNAL MEDICINE

## 2018-10-05 RX ORDER — CYANOCOBALAMIN 1000 UG/ML
1000 INJECTION INTRAMUSCULAR; SUBCUTANEOUS ONCE
Status: COMPLETED | OUTPATIENT
Start: 2018-10-05 | End: 2018-10-05

## 2018-10-05 RX ADMIN — CYANOCOBALAMIN 1000 MCG: 1000 INJECTION INTRAMUSCULAR; SUBCUTANEOUS at 17:23:00

## 2018-10-05 NOTE — PROGRESS NOTES
Pt. Is present for  B12 injection. Verified pt. Name ,, medication. Administered vit b12 injection. Pt. tolerated injection well.

## 2018-10-09 ENCOUNTER — NURSE ONLY (OUTPATIENT)
Dept: INTERNAL MEDICINE CLINIC | Facility: CLINIC | Age: 53
End: 2018-10-09
Payer: MEDICAID

## 2018-10-09 DIAGNOSIS — E53.8 B12 DEFICIENCY: Primary | ICD-10-CM

## 2018-10-09 PROCEDURE — 96372 THER/PROPH/DIAG INJ SC/IM: CPT | Performed by: INTERNAL MEDICINE

## 2018-10-09 RX ORDER — CYANOCOBALAMIN 1000 UG/ML
1000 INJECTION INTRAMUSCULAR; SUBCUTANEOUS ONCE
Status: COMPLETED | OUTPATIENT
Start: 2018-10-09 | End: 2018-10-09

## 2018-10-09 RX ADMIN — CYANOCOBALAMIN 1000 MCG: 1000 INJECTION INTRAMUSCULAR; SUBCUTANEOUS at 16:51:00

## 2018-10-09 NOTE — PROGRESS NOTES
Pt was here today to receive B-12 injection per pcp request. Pt name and  verified. Pt tolerated injection well with no reaction noted, pt received injection on left deltoid.

## 2018-10-15 ENCOUNTER — NURSE ONLY (OUTPATIENT)
Dept: INTERNAL MEDICINE CLINIC | Facility: CLINIC | Age: 53
End: 2018-10-15
Payer: MEDICAID

## 2018-10-15 DIAGNOSIS — E53.8 VITAMIN B 12 DEFICIENCY: ICD-10-CM

## 2018-10-15 PROCEDURE — 96372 THER/PROPH/DIAG INJ SC/IM: CPT | Performed by: INTERNAL MEDICINE

## 2018-10-15 RX ORDER — CYANOCOBALAMIN 1000 UG/ML
1000 INJECTION INTRAMUSCULAR; SUBCUTANEOUS ONCE
Status: COMPLETED | OUTPATIENT
Start: 2018-10-15 | End: 2018-10-15

## 2018-10-15 RX ADMIN — CYANOCOBALAMIN 1000 MCG: 1000 INJECTION INTRAMUSCULAR; SUBCUTANEOUS at 17:13:00

## 2018-10-15 NOTE — PROGRESS NOTES
Patient came in today for her Vitamin B-12 injection.  nurse Cailin Bowen LPN was contacted on verification of order. Per Nayeli Encarnacion she stated this can be her last weekly and then she can go monthly. Patient verified  and name.  Patient respond

## 2018-10-19 ENCOUNTER — TELEPHONE (OUTPATIENT)
Dept: OTHER | Age: 53
End: 2018-10-19

## 2018-10-19 DIAGNOSIS — E53.8 VITAMIN B12 DEFICIENCY: Primary | ICD-10-CM

## 2018-10-19 RX ORDER — THIAMINE HCL 100 MG
TABLET ORAL
Qty: 90 TABLET | Refills: 1 | Status: SHIPPED | OUTPATIENT
Start: 2018-10-19 | End: 2020-12-20

## 2018-10-19 NOTE — TELEPHONE ENCOUNTER
Patient should complete vitamin B12 -injections 4 dosage on Monday-and then continue monthly-for 12 months-    If patient does not want to keep with B12 injections anymore-can TRY  to take vitamin B12 supplements sublingual 2500 mcg daily- then we can rech

## 2018-10-19 NOTE — TELEPHONE ENCOUNTER
Patient calling and wants to clarify about the Vit B12 injection.  States that she received the 3rd weekly dose of B12 shot last 10/15/18 and the nurse told her that will be her last shot and no more, states that per Dr Donna Hodges order before should be

## 2018-10-19 NOTE — TELEPHONE ENCOUNTER
Advised patient on Dr. Cornell More information and recommendations. Patient verbalized understanding. She will come in for the 4th B12 injection then take the sublingual supplement.  Wanted to re-schedule her Monday appt to Tuesday; transferred to the [facility]

## 2018-10-24 ENCOUNTER — APPOINTMENT (OUTPATIENT)
Dept: LAB | Facility: HOSPITAL | Age: 53
End: 2018-10-24
Attending: INTERNAL MEDICINE
Payer: MEDICAID

## 2018-10-24 ENCOUNTER — NURSE ONLY (OUTPATIENT)
Dept: INTERNAL MEDICINE CLINIC | Facility: CLINIC | Age: 53
End: 2018-10-24
Payer: MEDICAID

## 2018-10-24 DIAGNOSIS — E53.8 VITAMIN B12 DEFICIENCY: ICD-10-CM

## 2018-10-24 PROCEDURE — 36415 COLL VENOUS BLD VENIPUNCTURE: CPT

## 2018-10-24 PROCEDURE — 96372 THER/PROPH/DIAG INJ SC/IM: CPT | Performed by: INTERNAL MEDICINE

## 2018-10-24 PROCEDURE — 82607 VITAMIN B-12: CPT

## 2018-10-24 RX ORDER — CYANOCOBALAMIN 1000 UG/ML
1000 INJECTION INTRAMUSCULAR; SUBCUTANEOUS ONCE
Status: COMPLETED | OUTPATIENT
Start: 2018-10-24 | End: 2018-10-24

## 2018-10-24 RX ADMIN — CYANOCOBALAMIN 1000 MCG: 1000 INJECTION INTRAMUSCULAR; SUBCUTANEOUS at 11:03:00

## 2018-10-24 NOTE — PROGRESS NOTES
Pt came in 10/24 for nurse visit b12. Verified  and name. Verified doctors orders. Pt had no reactions to immunization.

## 2018-12-03 RX ORDER — FLUTICASONE PROPIONATE 50 MCG
SPRAY, SUSPENSION (ML) NASAL
Qty: 3 BOTTLE | Refills: 0 | Status: SHIPPED | OUTPATIENT
Start: 2018-12-03

## 2018-12-10 ENCOUNTER — TELEPHONE (OUTPATIENT)
Dept: OTHER | Age: 53
End: 2018-12-10

## 2018-12-10 NOTE — TELEPHONE ENCOUNTER
Patient calling and states that she heard that Losartan is under recall and she is taking losartan, advised to call her pharmacy and verify with them if the  that made her losartan is the one under recall and then call us back in here, Isreal Chakraborty

## 2019-02-10 DIAGNOSIS — D64.9 ANEMIA, UNSPECIFIED TYPE: Primary | ICD-10-CM

## 2019-02-11 RX ORDER — LOSARTAN POTASSIUM AND HYDROCHLOROTHIAZIDE 25; 100 MG/1; MG/1
TABLET ORAL
Qty: 90 TABLET | Refills: 0 | Status: SHIPPED | OUTPATIENT
Start: 2019-02-11 | End: 2019-05-03

## 2019-04-08 NOTE — PATIENT INSTRUCTIONS
Understanding Nasal Allergies  Nasal allergies (also called allergic rhinitis) are a common health problem. They may be seasonal. This means they cause symptoms only at certain times of the year.  Or they may be perennial. This [place] [address]
Phone:  [phone]                     ELECTROCARDIOGRAM REPORT      
_______________________________________________________________________________
 
Name:       TAMMY BAGLEY                Room:                      [facility]#:  [id number]      Account #:      [id number]  
Admission:  19     Attend Phys:                         
Discharge:  19     Date of Birth:  [birth year]  
         Report #: [id number]
    [id number]
_______________________________________________________________________________
THIS REPORT FOR:  //name//                      
 
                         [facility] ED
                                       
Test Date:    2019               Test Time:    19:19:51
Pat Name:     TAMMY BAGLEY              Department:   
Patient ID:   [id number]            Room:         [facility]
Gender:       F                        Technician:   TOM
:          1959               Requested By: Becky Mckeon
Order Number: [id number] MD:   Justin Tovar
                                 Measurements
Intervals                              Axis          
Rate:         120                      P:            35
NV:           157                      QRS:          28
QRSD:         90                       T:            85
QT:           301                                    
QTc:          426                                    
                           Interpretive Statements
Sinus tachycardia
nonspecific st changes
Borderline low voltage, extremity leads
Compared to ECG 2016 14:53:16
Sinus rhythm no longer present
Left ventricular hypertrophy no longer present
 
 
Electronically Signed On 2019 13:08:45 CDT by Justin Tovar
[email or web address]
 
 
 
 
 
 
 
 
 
 
 
 
 
 
 
  <ELECTRONICALLY SIGNED>
                                           By: Justin Tovar MD, [facility]      
  19     1308
D: 19 1919   _____________________________________
T: 19   Justin Tovar MD, [facility]        /EPI Treatment  Your healthcare provider will evaluate you to find the cause of your symptoms then recommend treatment.  If your symptoms are due to nasal allergies, your healthcare provider may prescribe nasal steroid sprays or oral antihistamines to help reduc

## 2019-05-04 RX ORDER — ATORVASTATIN CALCIUM 40 MG/1
TABLET, FILM COATED ORAL
Qty: 90 TABLET | Refills: 1 | Status: SHIPPED | OUTPATIENT
Start: 2019-05-04 | End: 2019-11-10

## 2019-05-04 RX ORDER — LOSARTAN POTASSIUM AND HYDROCHLOROTHIAZIDE 25; 100 MG/1; MG/1
TABLET ORAL
Qty: 90 TABLET | Refills: 1 | Status: SHIPPED | OUTPATIENT
Start: 2019-05-04 | End: 2019-09-11

## 2019-06-25 ENCOUNTER — ORDER TRANSCRIPTION (OUTPATIENT)
Dept: ADMINISTRATIVE | Facility: HOSPITAL | Age: 54
End: 2019-06-25

## 2019-06-25 DIAGNOSIS — Z12.31 ENCOUNTER FOR SCREENING MAMMOGRAM FOR MALIGNANT NEOPLASM OF BREAST: Primary | ICD-10-CM

## 2019-07-02 ENCOUNTER — OFFICE VISIT (OUTPATIENT)
Dept: INTERNAL MEDICINE CLINIC | Facility: CLINIC | Age: 54
End: 2019-07-02
Payer: MEDICAID

## 2019-07-02 VITALS
HEART RATE: 87 BPM | DIASTOLIC BLOOD PRESSURE: 70 MMHG | SYSTOLIC BLOOD PRESSURE: 104 MMHG | TEMPERATURE: 99 F | BODY MASS INDEX: 37 KG/M2 | WEIGHT: 227 LBS

## 2019-07-02 DIAGNOSIS — E53.8 B12 DEFICIENCY: ICD-10-CM

## 2019-07-02 DIAGNOSIS — L98.9 SKIN LESION: ICD-10-CM

## 2019-07-02 DIAGNOSIS — L97.509 TYPE 2 DIABETES MELLITUS WITH FOOT ULCER, WITHOUT LONG-TERM CURRENT USE OF INSULIN (HCC): Primary | ICD-10-CM

## 2019-07-02 DIAGNOSIS — E66.9 OBESITY (BMI 30-39.9): ICD-10-CM

## 2019-07-02 DIAGNOSIS — E78.00 PURE HYPERCHOLESTEROLEMIA: ICD-10-CM

## 2019-07-02 DIAGNOSIS — E11.621 TYPE 2 DIABETES MELLITUS WITH FOOT ULCER, WITHOUT LONG-TERM CURRENT USE OF INSULIN (HCC): Primary | ICD-10-CM

## 2019-07-02 PROCEDURE — 99214 OFFICE O/P EST MOD 30 MIN: CPT | Performed by: INTERNAL MEDICINE

## 2019-07-02 RX ORDER — FEXOFENADINE HCL 180 MG/1
180 TABLET ORAL DAILY
Qty: 30 TABLET | Refills: 1 | Status: SHIPPED | OUTPATIENT
Start: 2019-07-02 | End: 2019-08-30

## 2019-07-02 NOTE — PROGRESS NOTES
HPI:    Patient ID: Camila Arriaza is a 46year old female.   Patient presents with:  Anemia  Referral: Cataract  Pt  State   Want to   Have  That in future not  Now   Ear Problem: requesting drops for ear aches  -   Feels   Congested     Patient in office t tablet Rfl: 1   ATORVASTATIN 40 MG Oral Tab TAKE 1 TABLET(40 MG) BY MOUTH EVERY NIGHT Disp: 90 tablet Rfl: 1   FLUTICASONE PROPIONATE 50 MCG/ACT Nasal Suspension SHAKE LIQUID AND USE 2 SPRAYS IN EACH NOSTRIL EVERY DAY FOR 1 TO 2 WEEKS THEN AS NEEDED Disp: and mucous membranes are normal. No oropharyngeal exudate, posterior oropharyngeal edema or posterior oropharyngeal erythema.   + Postnasal drainage    Eyes: Pupils are equal, round, and reactive to light.  Conjunctivae, EOM and lids are normal. Right eye e accu-checks/SBGM three times daily and as needed  Eye exam  Apt   In few  Days and foot exam yearly  Take medications as perscribed  Directions and side effects of medications discussed w/pt  Pt verbalized understanding and compliance  Stable  cpm labs   I [E]      Meds This Visit:  Requested Prescriptions     Signed Prescriptions Disp Refills   • Fexofenadine HCl (ALLEGRA ALLERGY) 180 MG Oral Tab 30 tablet 1     Sig: Take 1 tablet (180 mg total) by mouth daily.  Take 1 tablet once daily for 1-2 weeks and the

## 2019-07-21 ENCOUNTER — HOSPITAL ENCOUNTER (EMERGENCY)
Facility: HOSPITAL | Age: 54
Discharge: HOME | End: 2019-07-21
Attending: EMERGENCY MEDICINE
Payer: MEDICAID

## 2019-07-21 VITALS
DIASTOLIC BLOOD PRESSURE: 101 MMHG | OXYGEN SATURATION: 97 % | RESPIRATION RATE: 20 BRPM | TEMPERATURE: 98 F | HEART RATE: 96 BPM | SYSTOLIC BLOOD PRESSURE: 141 MMHG

## 2019-07-21 DIAGNOSIS — M79.89 LEFT LEG SWELLING: Primary | ICD-10-CM

## 2019-07-21 PROCEDURE — 99282 EMERGENCY DEPT VISIT SF MDM: CPT

## 2019-07-21 NOTE — ED INITIAL ASSESSMENT (HPI)
Patient here with swelling to her left foot for the l;ast two weeks. Yesterday she noticed a bump above her left knee that is painful to the touch.  No trauma

## 2019-07-22 NOTE — ED PROVIDER NOTES
Patient Seen in: [facility] AND [facility] Emergency Department    History   Patient presents with:  Lower Extremity Injury (musculoskeletal)      HPI    Patient presents to the ED complaining of intermittent swelling to her left ankle/foot for the past 2 weeks. standard drinks      Drug use: No    Other Topics      Concerns:        Caffeine Concern: No        Exercise: Yes    Social History Narrative      The patient does not use an assistive device. .        The patient does live in a home with stairs.       ROS personally reviewed and interpreted all ED vitals.     Pulse Ox: 97%, Room air, Normal     Differential Diagnosis/ Diagnostic Considerations: Peripheral edema, DVT, ecchymosis    Medical Record Review: I personally reviewed available prior medical records f

## 2019-07-23 ENCOUNTER — HOSPITAL ENCOUNTER (OUTPATIENT)
Dept: MAMMOGRAPHY | Facility: HOSPITAL | Age: 54
Discharge: HOME | End: 2019-07-23
Attending: SURGERY
Payer: MEDICAID

## 2019-07-23 DIAGNOSIS — Z12.31 ENCOUNTER FOR SCREENING MAMMOGRAM FOR MALIGNANT NEOPLASM OF BREAST: ICD-10-CM

## 2019-07-23 PROCEDURE — 77063 BREAST TOMOSYNTHESIS BI: CPT | Performed by: SURGERY

## 2019-07-23 PROCEDURE — 77067 SCR MAMMO BI INCL CAD: CPT | Performed by: SURGERY

## 2019-08-06 ENCOUNTER — LAB ENCOUNTER (OUTPATIENT)
Dept: LAB | Facility: HOSPITAL | Age: 54
End: 2019-08-06
Attending: INTERNAL MEDICINE
Payer: MEDICAID

## 2019-08-06 ENCOUNTER — HOSPITAL ENCOUNTER (OUTPATIENT)
Dept: MAMMOGRAPHY | Facility: HOSPITAL | Age: 54
Discharge: HOME | End: 2019-08-06
Attending: SURGERY
Payer: MEDICAID

## 2019-08-06 DIAGNOSIS — E53.8 B12 DEFICIENCY: ICD-10-CM

## 2019-08-06 DIAGNOSIS — E11.621 TYPE 2 DIABETES MELLITUS WITH FOOT ULCER, WITHOUT LONG-TERM CURRENT USE OF INSULIN (HCC): ICD-10-CM

## 2019-08-06 DIAGNOSIS — L97.509 TYPE 2 DIABETES MELLITUS WITH FOOT ULCER, WITHOUT LONG-TERM CURRENT USE OF INSULIN (HCC): ICD-10-CM

## 2019-08-06 DIAGNOSIS — E78.00 PURE HYPERCHOLESTEROLEMIA: ICD-10-CM

## 2019-08-06 DIAGNOSIS — R92.8 ABNORMAL MAMMOGRAM: ICD-10-CM

## 2019-08-06 DIAGNOSIS — D51.9 ANEMIA DUE TO VITAMIN B12 DEFICIENCY, UNSPECIFIED B12 DEFICIENCY TYPE: ICD-10-CM

## 2019-08-06 LAB
ALBUMIN SERPL-MCNC: 3.5 G/DL (ref 3.4–5)
ALBUMIN/GLOB SERPL: 0.9 {RATIO} (ref 1–2)
ALP LIVER SERPL-CCNC: 97 U/L (ref 41–108)
ALT SERPL-CCNC: 18 U/L (ref 13–56)
ANION GAP SERPL CALC-SCNC: 8 MMOL/L (ref 0–18)
AST SERPL-CCNC: 11 U/L (ref 15–37)
BACTERIA UR QL AUTO: NEGATIVE /HPF
BASOPHILS # BLD AUTO: 0.05 X10(3) UL (ref 0–0.2)
BASOPHILS NFR BLD AUTO: 0.6 %
BILIRUB SERPL-MCNC: 0.3 MG/DL (ref 0.1–2)
BILIRUB UR QL: NEGATIVE
BUN BLD-MCNC: 22 MG/DL (ref 7–18)
BUN/CREAT SERPL: 23.4 (ref 10–20)
CALCIUM BLD-MCNC: 9.2 MG/DL (ref 8.5–10.1)
CHLORIDE SERPL-SCNC: 104 MMOL/L (ref 98–112)
CHOLEST SMN-MCNC: 185 MG/DL (ref ?–200)
CLARITY UR: CLEAR
CO2 SERPL-SCNC: 28 MMOL/L (ref 21–32)
COLOR UR: YELLOW
CREAT BLD-MCNC: 0.94 MG/DL (ref 0.55–1.02)
CREAT UR-SCNC: 120 MG/DL
DEPRECATED RDW RBC AUTO: 42.2 FL (ref 35.1–46.3)
EOSINOPHIL # BLD AUTO: 0.21 X10(3) UL (ref 0–0.7)
EOSINOPHIL NFR BLD AUTO: 2.5 %
ERYTHROCYTE [DISTWIDTH] IN BLOOD BY AUTOMATED COUNT: 14.3 % (ref 11–15)
EST. AVERAGE GLUCOSE BLD GHB EST-MCNC: 134 MG/DL (ref 68–126)
GLOBULIN PLAS-MCNC: 4.1 G/DL (ref 2.8–4.4)
GLUCOSE BLD-MCNC: 146 MG/DL (ref 70–99)
GLUCOSE UR-MCNC: NEGATIVE MG/DL
HBA1C MFR BLD HPLC: 6.3 % (ref ?–5.7)
HCT VFR BLD AUTO: 35.4 % (ref 35–48)
HDLC SERPL-MCNC: 46 MG/DL (ref 40–59)
HGB BLD-MCNC: 11.3 G/DL (ref 12–16)
HGB UR QL STRIP.AUTO: NEGATIVE
HYALINE CASTS #/AREA URNS AUTO: 1 /LPF
IMM GRANULOCYTES # BLD AUTO: 0.02 X10(3) UL (ref 0–1)
IMM GRANULOCYTES NFR BLD: 0.2 %
KETONES UR-MCNC: NEGATIVE MG/DL
LDLC SERPL CALC-MCNC: 120 MG/DL (ref ?–100)
LEUKOCYTE ESTERASE UR QL STRIP.AUTO: NEGATIVE
LYMPHOCYTES # BLD AUTO: 3.59 X10(3) UL (ref 1–4)
LYMPHOCYTES NFR BLD AUTO: 42.8 %
M PROTEIN MFR SERPL ELPH: 7.6 G/DL (ref 6.4–8.2)
MCH RBC QN AUTO: 26 PG (ref 26–34)
MCHC RBC AUTO-ENTMCNC: 31.9 G/DL (ref 31–37)
MCV RBC AUTO: 81.4 FL (ref 80–100)
MICROALBUMIN UR-MCNC: 0.55 MG/DL
MICROALBUMIN/CREAT 24H UR-RTO: 4.6 UG/MG (ref ?–30)
MONOCYTES # BLD AUTO: 0.53 X10(3) UL (ref 0.1–1)
MONOCYTES NFR BLD AUTO: 6.3 %
NEUTROPHILS # BLD AUTO: 3.98 X10 (3) UL (ref 1.5–7.7)
NEUTROPHILS # BLD AUTO: 3.98 X10(3) UL (ref 1.5–7.7)
NEUTROPHILS NFR BLD AUTO: 47.6 %
NITRITE UR QL STRIP.AUTO: NEGATIVE
NONHDLC SERPL-MCNC: 139 MG/DL (ref ?–130)
OSMOLALITY SERPL CALC.SUM OF ELEC: 296 MOSM/KG (ref 275–295)
PATIENT FASTING: YES
PATIENT FASTING: YES
PH UR: 5.5 [PH] (ref 5–8)
PLATELET # BLD AUTO: 394 10(3)UL (ref 150–450)
POTASSIUM SERPL-SCNC: 3.9 MMOL/L (ref 3.5–5.1)
PROT UR-MCNC: NEGATIVE MG/DL
RBC # BLD AUTO: 4.35 X10(6)UL (ref 3.8–5.3)
RBC #/AREA URNS AUTO: 1 /HPF
SODIUM SERPL-SCNC: 140 MMOL/L (ref 136–145)
SP GR UR STRIP: 1.01 (ref 1–1.03)
TRIGL SERPL-MCNC: 96 MG/DL (ref 30–149)
TSI SER-ACNC: 2.47 MIU/ML (ref 0.36–3.74)
UROBILINOGEN UR STRIP-ACNC: <2
VIT B12 SERPL-MCNC: 735 PG/ML (ref 193–986)
VLDLC SERPL CALC-MCNC: 19 MG/DL (ref 0–30)
WBC # BLD AUTO: 8.4 X10(3) UL (ref 4–11)
WBC #/AREA URNS AUTO: 3 /HPF

## 2019-08-06 PROCEDURE — 82607 VITAMIN B-12: CPT

## 2019-08-06 PROCEDURE — 83036 HEMOGLOBIN GLYCOSYLATED A1C: CPT

## 2019-08-06 PROCEDURE — 82043 UR ALBUMIN QUANTITATIVE: CPT

## 2019-08-06 PROCEDURE — 82570 ASSAY OF URINE CREATININE: CPT

## 2019-08-06 PROCEDURE — 81003 URINALYSIS AUTO W/O SCOPE: CPT

## 2019-08-06 PROCEDURE — 36415 COLL VENOUS BLD VENIPUNCTURE: CPT

## 2019-08-06 PROCEDURE — 84443 ASSAY THYROID STIM HORMONE: CPT

## 2019-08-06 PROCEDURE — 80053 COMPREHEN METABOLIC PANEL: CPT

## 2019-08-06 PROCEDURE — 77065 DX MAMMO INCL CAD UNI: CPT | Performed by: SURGERY

## 2019-08-06 PROCEDURE — 85025 COMPLETE CBC W/AUTO DIFF WBC: CPT

## 2019-08-06 PROCEDURE — 80061 LIPID PANEL: CPT

## 2019-08-06 PROCEDURE — 77061 BREAST TOMOSYNTHESIS UNI: CPT | Performed by: SURGERY

## 2019-08-14 NOTE — TELEPHONE ENCOUNTER
Review pended refill request as it does not fall under a protocol.   Requested Prescriptions     Pending Prescriptions Disp Refills   • CYCLOBENZAPRINE HCL 10 MG Oral Tab [Pharmacy Med Name: CYCLOBENZAPRINE 10MG TABLETS] 20 tablet 0     Sig: TAKE 1 TABLET B

## 2019-08-15 RX ORDER — CYCLOBENZAPRINE HCL 10 MG
TABLET ORAL
Qty: 20 TABLET | Refills: 1 | Status: SHIPPED | OUTPATIENT
Start: 2019-08-15 | End: 2019-12-13

## 2019-08-22 ENCOUNTER — APPOINTMENT (OUTPATIENT)
Dept: GENERAL RADIOLOGY | Age: 54
End: 2019-08-22
Attending: EMERGENCY MEDICINE
Payer: MEDICAID

## 2019-08-22 ENCOUNTER — OFFICE VISIT (OUTPATIENT)
Dept: PODIATRY CLINIC | Facility: CLINIC | Age: 54
End: 2019-08-22
Payer: MEDICAID

## 2019-08-22 ENCOUNTER — HOSPITAL ENCOUNTER (OUTPATIENT)
Age: 54
Discharge: HOME | End: 2019-08-22
Attending: EMERGENCY MEDICINE
Payer: MEDICAID

## 2019-08-22 VITALS
RESPIRATION RATE: 18 BRPM | OXYGEN SATURATION: 100 % | WEIGHT: 220 LBS | HEART RATE: 84 BPM | DIASTOLIC BLOOD PRESSURE: 81 MMHG | SYSTOLIC BLOOD PRESSURE: 135 MMHG | TEMPERATURE: 99 F | BODY MASS INDEX: 36 KG/M2

## 2019-08-22 DIAGNOSIS — M25.562 LEFT KNEE PAIN, UNSPECIFIED CHRONICITY: Primary | ICD-10-CM

## 2019-08-22 DIAGNOSIS — J01.90 ACUTE SINUSITIS, RECURRENCE NOT SPECIFIED, UNSPECIFIED LOCATION: ICD-10-CM

## 2019-08-22 DIAGNOSIS — G62.9 NEUROPATHY: Primary | ICD-10-CM

## 2019-08-22 PROCEDURE — 99214 OFFICE O/P EST MOD 30 MIN: CPT

## 2019-08-22 PROCEDURE — 73560 X-RAY EXAM OF KNEE 1 OR 2: CPT | Performed by: EMERGENCY MEDICINE

## 2019-08-22 PROCEDURE — 99213 OFFICE O/P EST LOW 20 MIN: CPT

## 2019-08-22 PROCEDURE — 99213 OFFICE O/P EST LOW 20 MIN: CPT | Performed by: PODIATRIST

## 2019-08-22 RX ORDER — NAPROXEN 500 MG/1
500 TABLET ORAL 2 TIMES DAILY PRN
Qty: 14 TABLET | Refills: 0 | Status: SHIPPED | OUTPATIENT
Start: 2019-08-22 | End: 2019-08-29

## 2019-08-22 RX ORDER — AMOXICILLIN 875 MG/1
875 TABLET, COATED ORAL 2 TIMES DAILY
Qty: 20 TABLET | Refills: 0 | Status: SHIPPED | OUTPATIENT
Start: 2019-08-22 | End: 2019-09-01

## 2019-08-22 NOTE — ED INITIAL ASSESSMENT (HPI)
C/o pain to right knee with swelling to LLE. No recent trauma. Mary Anderson on ice in 2017 and has had intermittent pain. + distal  CMS. Saw podiatrist this  Morning due to swelling. Also c/o feeling sick with congestion, cough, and ear pressure. No fever.  Sneezin

## 2019-08-22 NOTE — ED PROVIDER NOTES
Patient Seen in: [facility]    History   Patient presents with:  Knee Pain    Stated Complaint: congestion,knee    HPI    Patient is a 62-year-old female that has 2 complaints.   She complains of 2 weeks of sinus congestio and vital signs reviewed. All other systems reviewed and negative except as noted above.     Physical Exam     ED Triage Vitals [08/22/19 0939]   /81   Pulse 84   Resp 18   Temp 98.8 °F (37.1 °C)   Temp src Oral   SpO2 100 %   O2 Device None [employer] discussed with patient. Will try short trial of anti-inflammatories and encouraged her to follow-up with her physiatry us. In light of duration of patient's sinus symptoms will put her on a course of antibiotics as well. Encouraged outpatient follow-up.

## 2019-08-22 NOTE — PROGRESS NOTES
HPI:    Patient ID: Rogelio Ledbetter is a 53year old female. This 50-year-old diabetic presents to the office today and states that she is having increasing frustrations associated with both of her feet and knee.   The last time I saw this patient was 2 year (VITAMIN B-12) 2500 MCG Sublingual SL Tab Take 1  Tab under the tongue daily Disp: 90 tablet Rfl: 01   Glucose Blood (TIFFANIE CONTOUR NEXT TEST) In Vitro Strip Test blood glucose three times a daily.  Disp: 100 strip Rfl: 11   Blood Glucose Monitoring Suppl ( defined types were placed in this encounter.       Meds This Visit:  Requested Prescriptions      No prescriptions requested or ordered in this encounter       Imaging & Referrals:  NEURO - INTERNAL       [facility]#[id number]       Current Outpatient Medications:  amoxi daily. Disp:  Rfl:    LANCETS ULTRA THIN 30G Does not apply Misc 30 g by Does not apply route 3 (three) times daily.  Disp: 200 each Rfl: 11     Allergies:  Doxycycline                PHYSICAL EXAM:              ASSESSMENT/PLAN:   Neuropathy  (primary encou

## 2019-08-26 ENCOUNTER — OFFICE VISIT (OUTPATIENT)
Dept: INTERNAL MEDICINE CLINIC | Facility: CLINIC | Age: 54
End: 2019-08-26
Payer: MEDICAID

## 2019-08-26 VITALS
SYSTOLIC BLOOD PRESSURE: 122 MMHG | WEIGHT: 235 LBS | HEIGHT: 66 IN | BODY MASS INDEX: 37.77 KG/M2 | HEART RATE: 78 BPM | DIASTOLIC BLOOD PRESSURE: 80 MMHG

## 2019-08-26 DIAGNOSIS — G89.29 CHRONIC PAIN OF LEFT KNEE: Primary | ICD-10-CM

## 2019-08-26 DIAGNOSIS — M25.562 CHRONIC PAIN OF LEFT KNEE: Primary | ICD-10-CM

## 2019-08-26 PROCEDURE — 99213 OFFICE O/P EST LOW 20 MIN: CPT | Performed by: PHYSICIAN ASSISTANT

## 2019-08-26 RX ORDER — GABAPENTIN 100 MG/1
100 CAPSULE ORAL NIGHTLY
Qty: 30 CAPSULE | Refills: 0 | Status: SHIPPED | OUTPATIENT
Start: 2019-08-26 | End: 2019-09-20

## 2019-08-26 NOTE — PROGRESS NOTES
HPI:    Patient ID: Britany Lugo is a 53year old female. HPI   Patient presents complaining of left knee pain that has been ongoing for years however recently worsened.   She went to the ER for this complaint and upon x-ray was told that there was flui tablet Rfl: 1   ATORVASTATIN 40 MG Oral Tab TAKE 1 TABLET(40 MG) BY MOUTH EVERY NIGHT Disp: 90 tablet Rfl: 1   FLUTICASONE PROPIONATE 50 MCG/ACT Nasal Suspension SHAKE LIQUID AND USE 2 SPRAYS IN EACH NOSTRIL EVERY DAY FOR 1 TO 2 WEEKS THEN AS NEEDED Disp: breast lump removed         PHYSICAL EXAM:   /80 (BP Location: Right arm, Patient Position: Sitting)   Pulse 78   Ht 5' 6\" (1.676 m)   Wt 235 lb (106.6 kg)   BMI 37.93 kg/m²   Body mass index is 37.93 kg/m².   Physical Exam    Constitutional: INTERNAL  - EXT DME KNEE BRACE    No orders of the defined types were placed in this encounter. No follow-ups on file.     Meds This Visit:  Requested Prescriptions     Signed Prescriptions Disp Refills   • gabapentin 100 MG Oral Cap 30 capsule 0     Sig

## 2019-08-27 ENCOUNTER — TELEPHONE (OUTPATIENT)
Dept: INTERNAL MEDICINE CLINIC | Facility: CLINIC | Age: 54
End: 2019-08-27

## 2019-08-27 NOTE — TELEPHONE ENCOUNTER
Message was left on voicemail that PCP did not order the brace and therefore is not sure where she can get the brace. Information was left that pt can check with any local pharmacy or DME company to see if they carry the brace that was ordered. right achilles

## 2019-08-27 NOTE — TELEPHONE ENCOUNTER
Pt called in she stated she has order for EXTDME Knee Brace for left knee  and she wants to know where she can go to get the Brace.     Please advise

## 2019-08-30 ENCOUNTER — OFFICE VISIT (OUTPATIENT)
Dept: ORTHOPEDICS CLINIC | Facility: CLINIC | Age: 54
End: 2019-08-30
Payer: MEDICAID

## 2019-08-30 VITALS — BODY MASS INDEX: 37.77 KG/M2 | HEIGHT: 66 IN | WEIGHT: 235 LBS

## 2019-08-30 DIAGNOSIS — M17.12 PRIMARY OSTEOARTHRITIS OF LEFT KNEE: Primary | ICD-10-CM

## 2019-08-30 PROCEDURE — 99204 OFFICE O/P NEW MOD 45 MIN: CPT | Performed by: ORTHOPAEDIC SURGERY

## 2019-08-30 NOTE — PROGRESS NOTES
NURSING INTAKE COMMENTS: Patient presents with:  Consult: left knee pain -- Went to [facility] and had xrays 08/22/19. Onset about 1 mth. Rates pain 10/10 and constant. HPI: This 53year old female presents today with complaints of left knee pain.   She Glucose Blood (TIFFANIE CONTOUR NEXT TEST) In Vitro Strip Test blood glucose three times a daily.  Disp: 100 strip Rfl: 11   Blood Glucose Monitoring Suppl (TIFFANIE CONTOUR NEXT MONITOR) w/Device Does not apply Kit 1 Application by Does not apply route 3 (thre urinating  MUSCULOSKELETAL: denies musculoskeletal complaints other than in HPI  NEURO: denies numbness, tingling, weakness, balance issues, dizziness, memory loss  PSYCHIATRIC: denies Hx of depression, anxiety, other psychiatric disorders  HEMATOLOGIC: de Domo Cowan MD on 8/22/2019 at 10:22          [facility] 2d+3d Diagnostic Addl Vws Left (cpt=77065/46727)    Result Date: 8/6/2019  PROCEDURE: [facility] [place] 2D+3D DIAGNOSTIC ADDL VWS  LEFT (CPT=77065/23834)  COMPARISON: [facility], [facility] 22 (H) 08/06/2019    CREATSERUM 0.94 08/06/2019    GFRNAA 69 08/06/2019    GFRAA 80 08/06/2019        Assessment and Plan:  Diagnoses and all orders for this visit:    Primary osteoarthritis of left knee        Assessment: Left knee osteoarthritis primary

## 2019-09-05 ENCOUNTER — OFFICE VISIT (OUTPATIENT)
Dept: PHYSICAL THERAPY | Age: 54
End: 2019-09-05
Attending: PHYSICIAN ASSISTANT
Payer: MEDICAID

## 2019-09-05 DIAGNOSIS — G89.29 CHRONIC PAIN OF LEFT KNEE: ICD-10-CM

## 2019-09-05 DIAGNOSIS — M25.562 CHRONIC PAIN OF LEFT KNEE: ICD-10-CM

## 2019-09-05 PROCEDURE — 97110 THERAPEUTIC EXERCISES: CPT | Performed by: PHYSICAL THERAPIST

## 2019-09-05 PROCEDURE — 97162 PT EVAL MOD COMPLEX 30 MIN: CPT | Performed by: PHYSICAL THERAPIST

## 2019-09-05 NOTE — PROGRESS NOTES
LOWER EXTREMITY EVALUATION:   Referring Physician: Dr. Timbo Baker  Diagnosis: Chronic L knee pain , osteoarthritis of L knee Date of Service: 9/5/2019     PATIENT SUMMARY   Amy Chapa is a 53year old female who presents to therapy today with complaints o with stairs. Signs and symptoms are consistent with diagnosis of L knee pain. Pt and PT discussed evaluation findings, pathology, POC and HEP. Pt voiced understanding and performs HEP correctly without reported pain.  Skilled Physical Therapy is medically structures involved/activity limitations, and evolving symptoms including changing pain levels. PLAN OF CARE:    Goals: (to be met in 6-10 visits)  Goals     • Therapy Goals      1. Patient independent with ongoing HEP.    2. Patient to have increase in AR

## 2019-09-09 ENCOUNTER — MED REC SCAN ONLY (OUTPATIENT)
Dept: INTERNAL MEDICINE CLINIC | Facility: CLINIC | Age: 54
End: 2019-09-09

## 2019-09-10 ENCOUNTER — HOSPITAL ENCOUNTER (OUTPATIENT)
Age: 54
Discharge: HOME | End: 2019-09-10
Payer: MEDICAID

## 2019-09-10 ENCOUNTER — APPOINTMENT (OUTPATIENT)
Dept: GENERAL RADIOLOGY | Age: 54
End: 2019-09-10
Attending: NURSE PRACTITIONER
Payer: MEDICAID

## 2019-09-10 VITALS
OXYGEN SATURATION: 98 % | WEIGHT: 235 LBS | TEMPERATURE: 99 F | HEART RATE: 100 BPM | DIASTOLIC BLOOD PRESSURE: 87 MMHG | SYSTOLIC BLOOD PRESSURE: 134 MMHG | HEIGHT: 65 IN | BODY MASS INDEX: 39.15 KG/M2 | RESPIRATION RATE: 20 BRPM

## 2019-09-10 DIAGNOSIS — J06.9 UPPER RESPIRATORY TRACT INFECTION, UNSPECIFIED TYPE: Primary | ICD-10-CM

## 2019-09-10 PROCEDURE — 99214 OFFICE O/P EST MOD 30 MIN: CPT

## 2019-09-10 PROCEDURE — 99213 OFFICE O/P EST LOW 20 MIN: CPT

## 2019-09-10 PROCEDURE — 71046 X-RAY EXAM CHEST 2 VIEWS: CPT | Performed by: NURSE PRACTITIONER

## 2019-09-10 RX ORDER — AMOXICILLIN 875 MG/1
875 TABLET, COATED ORAL 2 TIMES DAILY
COMMUNITY

## 2019-09-10 RX ORDER — AZITHROMYCIN 250 MG/1
TABLET, FILM COATED ORAL
Qty: 1 PACKAGE | Refills: 0 | Status: SHIPPED | OUTPATIENT
Start: 2019-09-10 | End: 2019-09-10

## 2019-09-10 RX ORDER — BENZONATATE 100 MG/1
200 CAPSULE ORAL 3 TIMES DAILY PRN
Qty: 30 CAPSULE | Refills: 0 | Status: SHIPPED | OUTPATIENT
Start: 2019-09-10 | End: 2019-10-10

## 2019-09-10 RX ORDER — NAPROXEN 500 MG/1
500 TABLET ORAL 2 TIMES DAILY WITH MEALS
COMMUNITY
End: 2019-09-11

## 2019-09-10 RX ORDER — AZITHROMYCIN 250 MG/1
TABLET, FILM COATED ORAL
Qty: 1 PACKAGE | Refills: 0 | Status: SHIPPED | OUTPATIENT
Start: 2019-09-10 | End: 2019-09-15

## 2019-09-10 RX ORDER — BENZONATATE 100 MG/1
200 CAPSULE ORAL 3 TIMES DAILY PRN
Qty: 30 CAPSULE | Refills: 0 | Status: SHIPPED | OUTPATIENT
Start: 2019-09-10 | End: 2019-09-10

## 2019-09-10 NOTE — ED PROVIDER NOTES
Patient presents with:  Cough/URI      HPI:     Jesenia Finnegan is a 53year old female who presents for evaluation and management of a chief complaint of a productive cough with green and yellow sputum and nasal congestion for the past 10 days.   The patient Mandaen service: Not on file        Active member of club or organization: Not on file        Attends meetings of clubs or organizations: Not on file        Relationship status: Not on file      Intimate partner violence:        Fear of current or ex par Placed This Encounter      XR CHEST PA + LAT CHEST (CPT=71046) Once          Order Comments:              cough Pt to IC withproductive cough, sinus drainage, sore throat x 3 weeks. Denies shortness of breath.                          Order Sp

## 2019-09-11 ENCOUNTER — OFFICE VISIT (OUTPATIENT)
Dept: PHYSICAL THERAPY | Age: 54
End: 2019-09-11
Attending: PHYSICIAN ASSISTANT
Payer: MEDICAID

## 2019-09-11 ENCOUNTER — OFFICE VISIT (OUTPATIENT)
Dept: INTERNAL MEDICINE CLINIC | Facility: CLINIC | Age: 54
End: 2019-09-11
Payer: MEDICAID

## 2019-09-11 VITALS
DIASTOLIC BLOOD PRESSURE: 82 MMHG | SYSTOLIC BLOOD PRESSURE: 118 MMHG | HEIGHT: 66 IN | TEMPERATURE: 99 F | HEART RATE: 95 BPM | WEIGHT: 236 LBS | BODY MASS INDEX: 37.93 KG/M2

## 2019-09-11 DIAGNOSIS — I10 ESSENTIAL HYPERTENSION: ICD-10-CM

## 2019-09-11 DIAGNOSIS — J06.9 URI WITH COUGH AND CONGESTION: ICD-10-CM

## 2019-09-11 DIAGNOSIS — M25.562 CHRONIC PAIN OF LEFT KNEE: Primary | ICD-10-CM

## 2019-09-11 DIAGNOSIS — G89.29 CHRONIC PAIN OF LEFT KNEE: Primary | ICD-10-CM

## 2019-09-11 DIAGNOSIS — E66.9 OBESITY (BMI 30-39.9): ICD-10-CM

## 2019-09-11 PROCEDURE — 97110 THERAPEUTIC EXERCISES: CPT

## 2019-09-11 PROCEDURE — 99214 OFFICE O/P EST MOD 30 MIN: CPT | Performed by: PHYSICIAN ASSISTANT

## 2019-09-11 RX ORDER — ALBUTEROL SULFATE 90 UG/1
2 AEROSOL, METERED RESPIRATORY (INHALATION) EVERY 4 HOURS PRN
Qty: 1 INHALER | Refills: 0 | Status: SHIPPED | OUTPATIENT
Start: 2019-09-11 | End: 2020-01-08

## 2019-09-11 RX ORDER — NAPROXEN 500 MG/1
500 TABLET ORAL 2 TIMES DAILY WITH MEALS
Qty: 60 TABLET | Refills: 1 | Status: SHIPPED | OUTPATIENT
Start: 2019-09-11 | End: 2019-11-10

## 2019-09-11 RX ORDER — LOSARTAN POTASSIUM AND HYDROCHLOROTHIAZIDE 25; 100 MG/1; MG/1
1 TABLET ORAL
Qty: 90 TABLET | Refills: 1 | Status: SHIPPED | OUTPATIENT
Start: 2019-09-11 | End: 2020-01-08

## 2019-09-11 NOTE — PROGRESS NOTES
HPI:    Patient ID: Ines Vazquez is a 53year old female. HPI  Patient presents for follow-up of chronic left knee pain recently had her knee give out and ask experiencing more pain than usual.  Patient is seeing our orthopedic doctors.   Did discuss th Package Rfl: 0   benzonatate 100 MG Oral Cap Take 2 capsules (200 mg total) by mouth 3 (three) times daily as needed. Disp: 30 capsule Rfl: 0   gabapentin 100 MG Oral Cap Take 1 capsule (100 mg total) by mouth nightly.  Disp: 30 capsule Rfl: 0   CYCLOBENZAP Date   • APPENDECTOMY     • APPENDECTOMY     • CHOLECYSTECTOMY     • COLONOSCOPY N/A 9/14/2017    Performed by Kathy Mraia MD at [facility] ENDOSCOPY   • HYSTERECTOMY  2002   • REMOVAL GALLBLADDER     • TOTAL ABDOM HYSTERECTOMY     \  Family History   Probl difference as well as consult with another partner at the orthopedic group regarding surgery as an option. Patient states that she did not like the orthopedic doctor that she saw originally.   She is scheduled for an EMG in October and advised her to get a

## 2019-09-11 NOTE — PROGRESS NOTES
Dx: ( L ) knee osteoarthritis          Insurance (Authorized # of Visits):  9 visits [employer]         POC# of visits:10    Authorizing Physician: Dr. Laura Guerrero  Next MD visit: none scheduled  Fall Risk: standard         Precautions: N/A            Subjective: pat Theraex   Manual Manual Manual Manual Manual   Gait/NMRed Gait/NMRed Gait/NMRed Gait/NMRed Gait/NMRed   Others: Others: Others: Others: Others:     HEP: 9/11/19  : reviewed initial HEP with patient this visit .     Charges: ex's 3       Total Timed Treatmen

## 2019-09-12 ENCOUNTER — OFFICE VISIT (OUTPATIENT)
Dept: PHYSICAL THERAPY | Age: 54
End: 2019-09-12
Attending: PHYSICIAN ASSISTANT
Payer: MEDICAID

## 2019-09-12 PROCEDURE — 97110 THERAPEUTIC EXERCISES: CPT | Performed by: PHYSICAL THERAPIST

## 2019-09-12 NOTE — PROGRESS NOTES
Dx: ( L ) knee osteoarthritis          Insurance (Authorized # of Visits):  9 visits [employer]         POC# of visits:10    Authorizing Physician: Dr. Marbin Oconnor  Next MD visit: none scheduled  Fall Risk: standard         Precautions: N/A            Subjective: Kenny Gait/NMRed Gait/NMRed Gait/NMRed   Others: Others: Others: Others:  Others:     HEP: Added SLR supine, side SLR, prone SLR, prone knee flexion and standing hip abd for home program.     Charges: Ex 3      Total Timed Treatment: 40 min  Total Treatment Time:

## 2019-09-14 ENCOUNTER — HOSPITAL ENCOUNTER (EMERGENCY)
Facility: HOSPITAL | Age: 54
Discharge: HOME | End: 2019-09-14
Payer: MEDICAID

## 2019-09-14 ENCOUNTER — APPOINTMENT (OUTPATIENT)
Dept: GENERAL RADIOLOGY | Facility: HOSPITAL | Age: 54
End: 2019-09-14
Payer: MEDICAID

## 2019-09-14 VITALS
BODY MASS INDEX: 38.32 KG/M2 | HEIGHT: 65 IN | HEART RATE: 94 BPM | SYSTOLIC BLOOD PRESSURE: 129 MMHG | TEMPERATURE: 98 F | RESPIRATION RATE: 20 BRPM | OXYGEN SATURATION: 98 % | WEIGHT: 230 LBS | DIASTOLIC BLOOD PRESSURE: 97 MMHG

## 2019-09-14 DIAGNOSIS — J06.9 UPPER RESPIRATORY TRACT INFECTION, UNSPECIFIED TYPE: Primary | ICD-10-CM

## 2019-09-14 LAB — GLUCOSE BLDC GLUCOMTR-MCNC: 99 MG/DL (ref 70–99)

## 2019-09-14 PROCEDURE — 99283 EMERGENCY DEPT VISIT LOW MDM: CPT

## 2019-09-14 PROCEDURE — 71046 X-RAY EXAM CHEST 2 VIEWS: CPT

## 2019-09-14 PROCEDURE — 82962 GLUCOSE BLOOD TEST: CPT

## 2019-09-14 NOTE — ED PROVIDER NOTES
Patient Seen in: [facility] AND [facility] Emergency Department    History   Patient presents with:  Cough/URI    Stated Complaint: cough    HPI  60-year-old female, with a history of anemia, diabetes and high blood pressure, presents to the emergency department SpO2 98%   BMI 38.27 kg/m²         Physical Exam   Constitutional: She is oriented to person, place, and time. She appears well-developed and well-nourished. HENT:   Head: Normocephalic. Nose: Mucosal edema present.    Mouth/Throat: Posterior oropharyng visit.     Follow-up:  Sathya Brooke MD  [address] [place] [address]  [phone]    Schedule an appointment as soon as possible for a visit in 2 days  If symptoms worsen return to the ER        Medications Prescribed:  Current Discharge [facility]

## 2019-09-14 NOTE — ED NOTES
DISCHARGE INSTRUCTIONS GIVEN TO PATIENT. VERBALIZED UNDERSTANDING. NO DISTRESS. PATIENT LEFT ED AMBULATORY STEADY GAIT WITH DAUGHTER.

## 2019-09-21 NOTE — TELEPHONE ENCOUNTER
pls advise, thanks in advance.        Refill Protocol Appointment Criteria  · Appointment scheduled in the past 12 months or in the next 3 months  Recent Outpatient Visits            1 week ago     [address] [facility] in [place], [place]

## 2019-09-23 ENCOUNTER — OFFICE VISIT (OUTPATIENT)
Dept: INTERNAL MEDICINE CLINIC | Facility: CLINIC | Age: 54
End: 2019-09-23
Payer: MEDICAID

## 2019-09-23 VITALS
WEIGHT: 239 LBS | RESPIRATION RATE: 20 BRPM | BODY MASS INDEX: 38.41 KG/M2 | HEIGHT: 66 IN | TEMPERATURE: 98 F | DIASTOLIC BLOOD PRESSURE: 86 MMHG | HEART RATE: 98 BPM | SYSTOLIC BLOOD PRESSURE: 136 MMHG

## 2019-09-23 DIAGNOSIS — M25.562 LEFT KNEE PAIN, UNSPECIFIED CHRONICITY: Primary | ICD-10-CM

## 2019-09-23 DIAGNOSIS — E53.8 VITAMIN B 12 DEFICIENCY: ICD-10-CM

## 2019-09-23 DIAGNOSIS — J30.9 ALLERGIC RHINITIS, UNSPECIFIED SEASONALITY, UNSPECIFIED TRIGGER: ICD-10-CM

## 2019-09-23 DIAGNOSIS — R49.0 HOARSENESS OF VOICE: ICD-10-CM

## 2019-09-23 DIAGNOSIS — D57.3 SICKLE CELL TRAIT (HCC): ICD-10-CM

## 2019-09-23 DIAGNOSIS — I10 ESSENTIAL HYPERTENSION: ICD-10-CM

## 2019-09-23 PROCEDURE — 99214 OFFICE O/P EST MOD 30 MIN: CPT | Performed by: INTERNAL MEDICINE

## 2019-09-23 RX ORDER — FEXOFENADINE HCL 180 MG/1
180 TABLET ORAL DAILY
Qty: 30 TABLET | Refills: 1 | Status: SHIPPED | OUTPATIENT
Start: 2019-09-23

## 2019-09-23 RX ORDER — GABAPENTIN 100 MG/1
CAPSULE ORAL
Qty: 30 CAPSULE | Refills: 1 | Status: SHIPPED | OUTPATIENT
Start: 2019-09-23

## 2019-09-23 NOTE — PROGRESS NOTES
HPI:    Patient ID: Ines Vazquez is a 53year old female. Patient presents with:  Pain: left knee pain and questions about surgery and test results    Patient in office today for left Knee pain dg  With  Arthritis   Patient states feeling well otherwise. (90 Base) MCG/ACT Inhalation Aero Soln Inhale 2 puffs into the lungs every 4 (four) hours as needed for Wheezing. Disp: 1 Inhaler Rfl: 0   amoxicillin 875 MG Oral Tab Take 875 mg by mouth 2 (two) times daily.  Disp:  Rfl:    benzonatate 100 MG Oral Cap Take normal.   Nose: Mucosal edema and rhinorrhea present. Right sinus exhibits no maxillary sinus tenderness and no frontal sinus tenderness. Left sinus exhibits no maxillary sinus tenderness and no frontal sinus tenderness.    Mouth/Throat: Uvula is midline an pressure and heart rate at home   The side effects of medication discussed with patient   Patient verbalizes understanding and compliance  Stable cpm     3.  Hoarseness of voice  Possible due to postnasal drainage   Trial of allegra 180 mg qd  2-3 weeks ad

## 2019-10-21 ENCOUNTER — PROCEDURE VISIT (OUTPATIENT)
Dept: NEUROLOGY | Facility: CLINIC | Age: 54
End: 2019-10-21
Payer: MEDICAID

## 2019-10-21 DIAGNOSIS — G89.29 CHRONIC BILATERAL LOW BACK PAIN WITH BILATERAL SCIATICA: Primary | ICD-10-CM

## 2019-10-21 DIAGNOSIS — M54.41 CHRONIC BILATERAL LOW BACK PAIN WITH BILATERAL SCIATICA: Primary | ICD-10-CM

## 2019-10-21 DIAGNOSIS — M54.42 CHRONIC BILATERAL LOW BACK PAIN WITH BILATERAL SCIATICA: Primary | ICD-10-CM

## 2019-10-21 PROCEDURE — 95909 NRV CNDJ TST 5-6 STUDIES: CPT | Performed by: OTHER

## 2019-10-21 PROCEDURE — 95886 MUSC TEST DONE W/N TEST COMP: CPT | Performed by: OTHER

## 2019-10-21 NOTE — PROCEDURES
[address]  Phone: [phone]  Fax: [phone] REPORT          Patient: Alex Crawley Hand Dominance:     Patient ID: [id number] Referring Dr:  Dr. Katy Rebollar Conclusion: Ms Wilfred Brar, is complaining of left knee giving way along with numbness paresthesias of the toes. Occasional bilateral carpal tunnel symptoms. Denies radicular lower extremity pain. No neuropathic pain in the feet.     Unfortunately, she was late L. Tibialis anterior Deep peroneal (Fibular) L4-L5 N None None None None Normal N N N N   L. Tibialis posterior Tibial L4-L5 N None None None None Normal N N N N   L. Gastrocnemius (Medial head) Tibial S1-S2 N None None None None Normal N N N N   L.  Vastus

## 2019-10-22 ENCOUNTER — TELEPHONE (OUTPATIENT)
Dept: PODIATRY CLINIC | Facility: CLINIC | Age: 54
End: 2019-10-22

## 2019-10-22 ENCOUNTER — OFFICE VISIT (OUTPATIENT)
Dept: PODIATRY CLINIC | Facility: CLINIC | Age: 54
End: 2019-10-22
Payer: MEDICAID

## 2019-10-22 DIAGNOSIS — G62.9 NEUROPATHY: Primary | ICD-10-CM

## 2019-10-22 PROCEDURE — 99213 OFFICE O/P EST LOW 20 MIN: CPT | Performed by: PODIATRIST

## 2019-10-22 NOTE — TELEPHONE ENCOUNTER
Per Dr. Erum Rock- pt has EMG done and call her to schedule f/u for results. Called pt LMTCB- if she CB please offer her appt w/ Dr. Erum Rock to discuss results.

## 2019-10-22 NOTE — TELEPHONE ENCOUNTER
Pt called back, has appt at [address] [place] [address] on 10/24 @ 1:20pm, directions given

## 2019-10-29 ENCOUNTER — TELEPHONE (OUTPATIENT)
Dept: INTERNAL MEDICINE CLINIC | Facility: CLINIC | Age: 54
End: 2019-10-29

## 2019-10-29 NOTE — TELEPHONE ENCOUNTER
Patient called in requesting an order for Vitamin B12 lab. She states that she has been experiencing a lack of energy that started this weekend. She states that she would like to get back on the cycle for the B12 injections.      Please advise and call

## 2019-10-31 ENCOUNTER — OFFICE VISIT (OUTPATIENT)
Dept: OTOLARYNGOLOGY | Facility: CLINIC | Age: 54
End: 2019-10-31
Payer: MEDICAID

## 2019-10-31 ENCOUNTER — TELEPHONE (OUTPATIENT)
Dept: OTOLARYNGOLOGY | Facility: CLINIC | Age: 54
End: 2019-10-31

## 2019-10-31 VITALS
BODY MASS INDEX: 38.41 KG/M2 | SYSTOLIC BLOOD PRESSURE: 122 MMHG | TEMPERATURE: 98 F | DIASTOLIC BLOOD PRESSURE: 84 MMHG | WEIGHT: 239 LBS | HEIGHT: 66 IN

## 2019-10-31 DIAGNOSIS — H92.03 OTALGIA OF BOTH EARS: ICD-10-CM

## 2019-10-31 DIAGNOSIS — E53.8 VITAMIN B12 DEFICIENCY: ICD-10-CM

## 2019-10-31 DIAGNOSIS — R07.0 THROAT PAIN: Primary | ICD-10-CM

## 2019-10-31 PROCEDURE — 99243 OFF/OP CNSLTJ NEW/EST LOW 30: CPT | Performed by: OTOLARYNGOLOGY

## 2019-10-31 RX ORDER — LORATADINE 10 MG/1
10 TABLET ORAL DAILY
Qty: 30 TABLET | Refills: 3 | Status: SHIPPED | OUTPATIENT
Start: 2019-10-31

## 2019-10-31 RX ORDER — MELOXICAM 15 MG/1
15 TABLET ORAL DAILY
Qty: 30 TABLET | Refills: 3 | Status: SHIPPED | OUTPATIENT
Start: 2019-10-31 | End: 2020-03-11

## 2019-10-31 RX ORDER — MONTELUKAST SODIUM 10 MG/1
10 TABLET ORAL NIGHTLY
Qty: 30 TABLET | Refills: 3 | Status: SHIPPED | OUTPATIENT
Start: 2019-10-31

## 2019-10-31 NOTE — PROGRESS NOTES
Ezra Story is a 53year old female. Patient presents with:  Cough: pt reports cough for 2 months, constant cough, raspy voice at times       HISTORY OF PRESENT ILLNESS  She presents with several complaints.   She states that she has had a cough for 2 mo • APPENDECTOMY     • APPENDECTOMY     • CHOLECYSTECTOMY     • COLONOSCOPY N/A 9/14/2017    Performed by Marina Varma MD at [facility] ENDOSCOPY   • HYSTERECTOMY  2002   • REMOVAL GALLBLADDER     • TOTAL ABDOM HYSTERECTOMY           REVIEW OF SYSTEMS    Sys Submandibular. Anterior cervical. Posterior cervical. Supraclavicular.    TMJ   tender to palpation bilaterally   Nose/Mouth/Throat Normal External nose - Normal. Lips/teeth/gums - Normal. Tonsils - Normal. Oropharynx -erythema with postnasal discharge   No under the tongue daily, Disp: 90 tablet, Rfl: 01  •  Glucose Blood (TIFFANIE CONTOUR NEXT TEST) In Vitro Strip, Test blood glucose three times a daily. , Disp: 100 strip, Rfl: 11  •  Blood Glucose Monitoring Suppl (TIFFANIE CONTOUR NEXT MONITOR) w/Device Does not

## 2019-10-31 NOTE — TELEPHONE ENCOUNTER
Dr. Christopher Davis, please advise on vitamin b12 levels.  Please reply to pool: EM TRIAGE SUPPORT

## 2019-10-31 NOTE — TELEPHONE ENCOUNTER
Vitamin B12 was normal in August    There is another order  two orders different days for the blood test  Now  including vitamin B12  Patient can complete if any concerns f/u   In few  Weeks

## 2019-11-03 NOTE — TELEPHONE ENCOUNTER
Refill passed per [facility] protocol.     Requested Prescriptions   Pending Prescriptions Disp Refills   • METFORMIN HCL 1000 MG Oral Tab [Pharmacy Med Name: METFORMIN 1000MG TABLETS] 180 tablet 0     Sig: TAKE 1 TABLET BY MOUTH TWICE DAILY BEFORE CARY

## 2019-11-06 ENCOUNTER — TELEPHONE (OUTPATIENT)
Dept: NEUROLOGY | Facility: CLINIC | Age: 54
End: 2019-11-06

## 2019-11-06 ENCOUNTER — LAB ENCOUNTER (OUTPATIENT)
Dept: LAB | Age: 54
End: 2019-11-06
Attending: INTERNAL MEDICINE
Payer: MEDICAID

## 2019-11-06 DIAGNOSIS — D64.9 ANEMIA, UNSPECIFIED TYPE: ICD-10-CM

## 2019-11-06 DIAGNOSIS — E53.8 VITAMIN B12 DEFICIENCY: ICD-10-CM

## 2019-11-06 PROCEDURE — 82746 ASSAY OF FOLIC ACID SERUM: CPT

## 2019-11-06 PROCEDURE — 82728 ASSAY OF FERRITIN: CPT

## 2019-11-06 PROCEDURE — 36415 COLL VENOUS BLD VENIPUNCTURE: CPT

## 2019-11-06 PROCEDURE — 82607 VITAMIN B-12: CPT

## 2019-11-06 PROCEDURE — 84466 ASSAY OF TRANSFERRIN: CPT

## 2019-11-06 PROCEDURE — 83540 ASSAY OF IRON: CPT

## 2019-11-07 RX ORDER — FERROUS GLUCONATE 324(37.5)
1 TABLET ORAL DAILY
Qty: 90 TABLET | Refills: 0 | Status: SHIPPED | OUTPATIENT
Start: 2019-11-07 | End: 2019-12-13

## 2019-11-07 RX ORDER — FOLIC ACID 1 MG/1
1 TABLET ORAL DAILY
Qty: 90 TABLET | Refills: 1 | Status: SHIPPED | OUTPATIENT
Start: 2019-11-07 | End: 2020-05-07

## 2019-11-10 NOTE — TELEPHONE ENCOUNTER
Refill passed per [facility] protocol. However pls advise on lactation warning pop up.     Requested Prescriptions   Pending Prescriptions Disp Refills   • ATORVASTATIN 40 MG Oral Tab [Pharmacy Med Name: ATORVASTATIN 40MG TABLETS] 90 tablet 0     Sig:

## 2019-11-10 NOTE — TELEPHONE ENCOUNTER
Refill passed per [facility] protocol. However pls advise if refill appropriate?     Requested Prescriptions   Pending Prescriptions Disp Refills   • NAPROXEN 500 MG Oral Tab [Pharmacy Med Name: NAPROXEN 500MG TABLETS] 60 tablet 0     Sig: TAKE 1 TABLE

## 2019-11-11 ENCOUNTER — TELEPHONE (OUTPATIENT)
Dept: OTOLARYNGOLOGY | Facility: CLINIC | Age: 54
End: 2019-11-11

## 2019-11-11 RX ORDER — NAPROXEN 500 MG/1
TABLET ORAL
Qty: 60 TABLET | Refills: 0 | Status: SHIPPED | OUTPATIENT
Start: 2019-11-11 | End: 2021-09-02 | Stop reason: ALTCHOICE

## 2019-11-13 NOTE — TELEPHONE ENCOUNTER
Informed pt Prior auth for laryngoscopy in the office was approved [id number] valid from 11/11/19 thru 12/11/19 if needed and advised to keep the appointment on 11/30/19. pt verbalized understanding.

## 2019-11-15 RX ORDER — ATORVASTATIN CALCIUM 40 MG/1
TABLET, FILM COATED ORAL
Qty: 90 TABLET | Refills: 1 | Status: SHIPPED | OUTPATIENT
Start: 2019-11-15 | End: 2020-04-28

## 2019-12-13 ENCOUNTER — OFFICE VISIT (OUTPATIENT)
Dept: INTERNAL MEDICINE CLINIC | Facility: CLINIC | Age: 54
End: 2019-12-13
Payer: MEDICAID

## 2019-12-13 VITALS
SYSTOLIC BLOOD PRESSURE: 145 MMHG | HEIGHT: 66 IN | BODY MASS INDEX: 40.24 KG/M2 | DIASTOLIC BLOOD PRESSURE: 90 MMHG | HEART RATE: 91 BPM | WEIGHT: 250.38 LBS

## 2019-12-13 DIAGNOSIS — G89.29 CHRONIC PAIN OF LEFT KNEE: Primary | ICD-10-CM

## 2019-12-13 DIAGNOSIS — D64.9 ANEMIA, UNSPECIFIED TYPE: ICD-10-CM

## 2019-12-13 DIAGNOSIS — M25.562 CHRONIC PAIN OF LEFT KNEE: Primary | ICD-10-CM

## 2019-12-13 PROCEDURE — 99213 OFFICE O/P EST LOW 20 MIN: CPT | Performed by: PHYSICIAN ASSISTANT

## 2019-12-13 RX ORDER — IBUPROFEN 600 MG/1
600 TABLET ORAL EVERY 6 HOURS PRN
Qty: 30 TABLET | Refills: 0 | Status: SHIPPED | OUTPATIENT
Start: 2019-12-13 | End: 2021-09-02 | Stop reason: ALTCHOICE

## 2019-12-13 RX ORDER — CYCLOBENZAPRINE HCL 10 MG
TABLET ORAL
Qty: 20 TABLET | Refills: 1 | Status: SHIPPED | OUTPATIENT
Start: 2019-12-13 | End: 2021-07-08

## 2019-12-13 RX ORDER — FERROUS GLUCONATE 324(37.5)
1 TABLET ORAL DAILY
Qty: 90 TABLET | Refills: 0 | Status: SHIPPED | OUTPATIENT
Start: 2019-12-13 | End: 2020-04-28

## 2019-12-13 NOTE — PROGRESS NOTES
HPI:    Patient ID: Ines Vazquez is a 53year old female. HPI  Patient presents for follow up of her left knee pain. Needs a new brace.  Did discuss surgery with Dr. Beatriz Heaton, thinking of having it done now that shes also gained more weight and having m weeks and then as needed. 30 tablet 1   • Losartan Potassium-HCTZ 100-25 MG Oral Tab Take 1 tablet by mouth once daily.  90 tablet 1   • Albuterol Sulfate HFA (PROAIR HFA) 108 (90 Base) MCG/ACT Inhalation Aero Soln Inhale 2 puffs into the lungs every 4 (fou (Other) Sister         breast lump removed         PHYSICAL EXAM:   /90 (BP Location: Right arm, Patient Position: Sitting, Cuff Size: large)   Pulse 91   Ht 5' 6\" (1.676 m)   Wt 250 lb 6.4 oz (113.6 kg)   BMI 40.42 kg/m²   Body mass index is 40.42 Disp Refills   • Ferrous Gluconate 324 (37.5 Fe) MG Oral Tab 90 tablet 0     Sig: Take 1 tablet (324 mg total) by mouth daily. • ibuprofen 600 MG Oral Tab 30 tablet 0     Sig: Take 1 tablet (600 mg total) by mouth every 6 (six) hours as needed for Pain.

## 2019-12-17 ENCOUNTER — MED REC SCAN ONLY (OUTPATIENT)
Dept: INTERNAL MEDICINE CLINIC | Facility: CLINIC | Age: 54
End: 2019-12-17

## 2019-12-17 ENCOUNTER — TELEPHONE (OUTPATIENT)
Dept: OTHER | Age: 54
End: 2019-12-17

## 2019-12-17 ENCOUNTER — NURSE TRIAGE (OUTPATIENT)
Dept: OTHER | Age: 54
End: 2019-12-17

## 2019-12-17 RX ORDER — AZITHROMYCIN 250 MG/1
TABLET, FILM COATED ORAL
Qty: 6 TABLET | Refills: 0 | Status: SHIPPED | OUTPATIENT
Start: 2019-12-17 | End: 2021-07-08

## 2019-12-17 NOTE — TELEPHONE ENCOUNTER
Action Requested: Summary for Provider     []  Critical Lab, Recommendations Needed  [] Need Additional Advice  []   FYI    []   Need Orders  [x] Need Medications Sent to Pharmacy  []  Other     SUMMARY: Patient was just seen on 12/13/19 by JASMYNE Casanova

## 2019-12-17 NOTE — TELEPHONE ENCOUNTER
Spoke with the patient and instructed on her Destini's orders and plan of care from below. Patient voiced understanding and agreed with the plan of care.

## 2019-12-17 NOTE — TELEPHONE ENCOUNTER
Pt was contacted and informed that the form for the parking placard is ready for  at the [facility] location at the font desk. Understanding was voiced.

## 2019-12-17 NOTE — TELEPHONE ENCOUNTER
zpack sent to pharm on file off [address], advise mucinex as well for congestion and supportive measures. otc delsym cough syrup is good.

## 2019-12-17 NOTE — TELEPHONE ENCOUNTER
Patient dropped off forms for JASMYNE Dallas to complete on 12/13/19. Please advise if forms completed.

## 2019-12-17 NOTE — TELEPHONE ENCOUNTER
Dr. Roberta Mccrary is working on it she has it with her at the [place] office. Once done patient may .  Please let her know that shes working on it

## 2020-01-09 RX ORDER — AZITHROMYCIN 250 MG/1
TABLET, FILM COATED ORAL
Qty: 6 TABLET | Refills: 0 | OUTPATIENT
Start: 2020-01-09

## 2020-01-09 RX ORDER — LOSARTAN POTASSIUM AND HYDROCHLOROTHIAZIDE 25; 100 MG/1; MG/1
1 TABLET ORAL
Qty: 90 TABLET | Refills: 1 | Status: SHIPPED | OUTPATIENT
Start: 2020-01-09 | End: 2020-04-28

## 2020-01-09 RX ORDER — ALBUTEROL SULFATE 90 UG/1
2 AEROSOL, METERED RESPIRATORY (INHALATION) EVERY 4 HOURS PRN
Qty: 1 INHALER | Refills: 0 | Status: SHIPPED | OUTPATIENT
Start: 2020-01-09

## 2020-01-31 RX ORDER — LOSARTAN POTASSIUM AND HYDROCHLOROTHIAZIDE 25; 100 MG/1; MG/1
TABLET ORAL
Qty: 90 TABLET | Refills: 1 | OUTPATIENT
Start: 2020-01-31

## 2020-03-10 NOTE — TELEPHONE ENCOUNTER
Patient last visit on 10/31/19 throat pain request for refill last note pt is due for follow up,please advise.

## 2020-03-11 RX ORDER — MELOXICAM 15 MG/1
TABLET ORAL
Qty: 30 TABLET | Refills: 3 | Status: SHIPPED | OUTPATIENT
Start: 2020-03-11 | End: 2021-09-02 | Stop reason: ALTCHOICE

## 2020-04-28 DIAGNOSIS — D57.3 SICKLE CELL TRAIT (HCC): Primary | ICD-10-CM

## 2020-04-28 DIAGNOSIS — E78.5 HYPERLIPIDEMIA, UNSPECIFIED HYPERLIPIDEMIA TYPE: ICD-10-CM

## 2020-04-28 DIAGNOSIS — I10 ESSENTIAL HYPERTENSION: ICD-10-CM

## 2020-04-28 RX ORDER — ATORVASTATIN CALCIUM 40 MG/1
40 TABLET, FILM COATED ORAL NIGHTLY
Qty: 90 TABLET | Refills: 0 | Status: SHIPPED | OUTPATIENT
Start: 2020-04-28 | End: 2020-08-02

## 2020-04-28 RX ORDER — LOSARTAN POTASSIUM AND HYDROCHLOROTHIAZIDE 25; 100 MG/1; MG/1
1 TABLET ORAL
Qty: 90 TABLET | Refills: 0 | Status: SHIPPED | OUTPATIENT
Start: 2020-04-28 | End: 2020-07-01

## 2020-04-28 NOTE — TELEPHONE ENCOUNTER
Patient is requesting refills    ATORVASTATIN 40 MG Oral Tab  (is totally out)    Losartan Potassium-HCTZ 100-25 MG Oral Tab    Ferrous Gluconate 324 (37.5 Fe) MG Oral Tab

## 2020-04-28 NOTE — TELEPHONE ENCOUNTER
Patient informed of provider's response/orders below and patient voiced understating and agrees to complete labs in the next month. Pt states had also requested metformin to be refilled. Rx pended for review/approval.

## 2020-05-01 NOTE — PROGRESS NOTES
HPI:    Patient ID: Lidia Caputo is a 53year old female. This 23-year-old female presents to review findings of the most recent EMG test neurologic evaluation. She states that she did not check her blood sugar level today.   She is been a diabetic since Kit, 1 Application by Does not apply route 3 (three) times daily. , Disp: 1 kit, Rfl: 0  ibuprofen 600 MG Oral Tab, Take 600 mg by mouth every 6 (six) hours as needed for Pain., Disp: , Rfl:   aspirin 81 MG Oral Chew Tab, Chew 81 mg by mouth daily. , Disp: , Hydroxyzine Counseling: Patient advised that the medication is sedating and not to drive a car after taking this medication.  Patient informed of potential adverse effects including but not limited to dry mouth, urinary retention, and blurry vision.  The patient verbalized understanding of the proper use and possible adverse effects of hydroxyzine.  All of the patient's questions and concerns were addressed.

## 2020-05-06 DIAGNOSIS — D64.9 ANEMIA, UNSPECIFIED TYPE: ICD-10-CM

## 2020-05-07 RX ORDER — FOLIC ACID 1 MG/1
TABLET ORAL
Qty: 90 TABLET | Refills: 1 | Status: SHIPPED | OUTPATIENT
Start: 2020-05-07 | End: 2020-10-28

## 2020-07-01 ENCOUNTER — VIRTUAL PHONE E/M (OUTPATIENT)
Dept: INTERNAL MEDICINE CLINIC | Facility: CLINIC | Age: 55
End: 2020-07-01
Payer: MEDICAID

## 2020-07-01 DIAGNOSIS — I10 ESSENTIAL HYPERTENSION: Primary | ICD-10-CM

## 2020-07-01 DIAGNOSIS — E53.8 VITAMIN B 12 DEFICIENCY: ICD-10-CM

## 2020-07-01 DIAGNOSIS — D64.9 ANEMIA, UNSPECIFIED TYPE: ICD-10-CM

## 2020-07-01 DIAGNOSIS — E78.5 HYPERLIPIDEMIA, UNSPECIFIED HYPERLIPIDEMIA TYPE: ICD-10-CM

## 2020-07-01 DIAGNOSIS — D57.3 SICKLE CELL TRAIT (HCC): ICD-10-CM

## 2020-07-01 DIAGNOSIS — E11.9 TYPE 2 DIABETES MELLITUS WITHOUT COMPLICATION, WITHOUT LONG-TERM CURRENT USE OF INSULIN (HCC): ICD-10-CM

## 2020-07-01 PROCEDURE — 99213 OFFICE O/P EST LOW 20 MIN: CPT | Performed by: INTERNAL MEDICINE

## 2020-07-01 RX ORDER — LOSARTAN POTASSIUM AND HYDROCHLOROTHIAZIDE 25; 100 MG/1; MG/1
1 TABLET ORAL
Qty: 90 TABLET | Refills: 0 | Status: SHIPPED | OUTPATIENT
Start: 2020-07-01 | End: 2021-07-08

## 2020-07-01 NOTE — PROGRESS NOTES
Telemedicine Note    Virtual/Telephone Check-In    Victorino Bermeo verbally consents to a Virtual/Telephone Check-In service on 07/01/20.  Patient understands and accepts financial responsibility for any deductible, co-insurance and/or co-pays associated wit [facility])    • Fall 02/09/2018   • High blood pressure    • High cholesterol    • Hyperlipidemia    • Sickle cell trait (HCC)    • Unspecified essential hypertension      Past Surgical History:   Procedure Laterality Date   • Appendectomy     • Appendectomy DAILY FOR MUSCLE SPASMS 20 tablet 1   • NAPROXEN 500 MG Oral Tab TAKE 1 TABLET(500 MG) BY MOUTH TWICE DAILY WITH MEALS 60 tablet 0   • loratadine 10 MG Oral Tab Take 1 tablet (10 mg total) by mouth daily.  30 tablet 3   • Montelukast Sodium 10 MG Oral Tab T negative for headache and dizziness  PSYCHE: negative for depression or anxiety symptoms    ALL/ASTHMA: negative for allergy symptoms     Physical Exam  Patient alert and oriented x3  Patient does not appear in any respiratory distress during the conversat type  · Keep low saturated fat  diet   · Avoid red meat and fast/fried food  · fruits and vegetables   · Keep active /walking ,exercise as  tolerated  · Reach ideal weight   · Continue present management   Continue atorvastatin 40 mg at night labs to compl

## 2020-07-14 ENCOUNTER — TELEPHONE (OUTPATIENT)
Dept: GASTROENTEROLOGY | Facility: CLINIC | Age: 55
End: 2020-07-14

## 2020-07-14 NOTE — TELEPHONE ENCOUNTER
Leah Armendariz, SELWYN  P Em Gi Surg/Proc Scheduling             Recall colon in 3 years per PL.  Colon done 9-14-17

## 2020-08-02 RX ORDER — ATORVASTATIN CALCIUM 40 MG/1
TABLET, FILM COATED ORAL
Qty: 90 TABLET | Refills: 0 | Status: SHIPPED | OUTPATIENT
Start: 2020-08-02 | End: 2020-10-28

## 2020-10-02 RX ORDER — LOSARTAN POTASSIUM AND HYDROCHLOROTHIAZIDE 25; 100 MG/1; MG/1
1 TABLET ORAL DAILY
Qty: 90 TABLET | Refills: 0 | Status: SHIPPED | OUTPATIENT
Start: 2020-10-02 | End: 2021-03-31

## 2020-10-02 RX ORDER — LOSARTAN POTASSIUM AND HYDROCHLOROTHIAZIDE 25; 100 MG/1; MG/1
1 TABLET ORAL
Qty: 90 TABLET | Refills: 0 | OUTPATIENT
Start: 2020-10-02

## 2020-10-02 NOTE — TELEPHONE ENCOUNTER
Patient calling requesting a refill of Losartan-Hctz. Per pharmacy, patient's Medicaid compliance contract is  and patient's insurance is not being accepted until Dr. Adam Velazquez renews.  Patient only has two pills left and asking who can refill for

## 2020-10-28 DIAGNOSIS — D64.9 ANEMIA, UNSPECIFIED TYPE: ICD-10-CM

## 2020-10-28 NOTE — TELEPHONE ENCOUNTER
Current Outpatient Medications:     Current Outpatient Medications:   ••  ATORVASTATIN 40 MG Oral Tab, TAKE 1 TABLET BY MOUTH NIGHTLY, Disp: 90 tablet, Rfl: 0  •  FOLIC ACID 1 MG Oral Tab, TAKE 1 TABLET BY MOUTH EVERY DAY, Disp: 90 tablet, Rfl: 1  •  met

## 2020-10-29 RX ORDER — ATORVASTATIN CALCIUM 40 MG/1
40 TABLET, FILM COATED ORAL NIGHTLY
Qty: 30 TABLET | Refills: 0 | Status: SHIPPED | OUTPATIENT
Start: 2020-10-29 | End: 2021-02-26

## 2020-10-29 RX ORDER — FOLIC ACID 1 MG/1
1 TABLET ORAL DAILY
Qty: 90 TABLET | Refills: 1 | Status: SHIPPED | OUTPATIENT
Start: 2020-10-29 | End: 2020-12-20

## 2020-11-14 ENCOUNTER — TELEPHONE (OUTPATIENT)
Dept: INTERNAL MEDICINE CLINIC | Facility: CLINIC | Age: 55
End: 2020-11-14

## 2020-12-07 ENCOUNTER — TELEPHONE (OUTPATIENT)
Dept: INTERNAL MEDICINE CLINIC | Facility: CLINIC | Age: 55
End: 2020-12-07

## 2020-12-07 ENCOUNTER — TELEMEDICINE (OUTPATIENT)
Dept: INTERNAL MEDICINE CLINIC | Facility: CLINIC | Age: 55
End: 2020-12-07
Payer: MEDICAID

## 2020-12-07 DIAGNOSIS — E11.9 TYPE 2 DIABETES MELLITUS WITHOUT COMPLICATION, WITHOUT LONG-TERM CURRENT USE OF INSULIN (HCC): ICD-10-CM

## 2020-12-07 DIAGNOSIS — I10 ESSENTIAL HYPERTENSION: Primary | ICD-10-CM

## 2020-12-07 DIAGNOSIS — E78.00 PURE HYPERCHOLESTEROLEMIA: ICD-10-CM

## 2020-12-07 PROCEDURE — 99214 OFFICE O/P EST MOD 30 MIN: CPT | Performed by: INTERNAL MEDICINE

## 2020-12-07 NOTE — TELEPHONE ENCOUNTER
Kamilah Sibley pt stated that she is trying to go back to work but she needs a form to be filled out. Pt stated that she had nasal congestion and her throat gland were swollen but she is fine now. Pt was advised she needs to make a video visit.  Virtual vis

## 2020-12-07 NOTE — PROGRESS NOTES
Telemedicine Note    Virtual Video Check-In    Jesenia Finnegan verbally consents to a Virtual Video Check-In service on 12/07/20.  Patient understands and accepts financial responsibility for any deductible, co-insurance and/or co-pays associated with this se • Anemia    • Diabetes [facility])    • Fall 02/09/2018   • High blood pressure    • High cholesterol    • Hyperlipidemia    • Sickle cell trait (HCC)    • Unspecified essential hypertension    •   Past Surgical History:   Procedure Laterality Date   • Appende (600 mg total) by mouth every 6 (six) hours as needed for Pain.  30 tablet 0   • cyclobenzaprine 10 MG Oral Tab TAKE 1 TABLET BY MOUTH THREE TIMES DAILY FOR MUSCLE SPASMS 20 tablet 1   • NAPROXEN 500 MG Oral Tab TAKE 1 TABLET(500 MG) BY MOUTH TWICE DAILY WI urination, frequency with urination   MUSCULOSKELETAL: negative for body  aches   NEURO: negative for headache and dizziness  PSYCHE: negative for depression or anxiety symptoms    ALL/ASTHMA: negative for allergy symptoms     Per patient blood pressure  A Explained to patient rationale of phone triage and evaluation due to current COVID-19 outbreak based upon CDC recommendations, as well as limitations of video triage including but not limited to the inability to perform appropriate physical exam nor fa

## 2020-12-07 NOTE — TELEPHONE ENCOUNTER
Patient states she forgot to mention at appointment to Sheridan Quinn that she needs a form filled out for her temporary job. Patient will send form through [address]

## 2020-12-09 ENCOUNTER — TELEPHONE (OUTPATIENT)
Dept: INTERNAL MEDICINE CLINIC | Facility: CLINIC | Age: 55
End: 2020-12-09

## 2020-12-09 NOTE — TELEPHONE ENCOUNTER
Patient is requesting a return to work note for her employer. Patient states she took off 11/27 until 12/11/2020. Patient states she expects to return to work Monday 12/14/2020 and is requesting that note be posted to her my chart.

## 2020-12-10 NOTE — TELEPHONE ENCOUNTER
I am not aware that patient was off from work she did not mention anything to me telehealth visit time      Did she have COVID-19 testing? She did  Not   .she should have it done before going  back to work .

## 2020-12-10 NOTE — TELEPHONE ENCOUNTER
Dr Arnulfo Colunga,     See previous message below with dates. Pt called again to follow up on return to work note. Sinuses feeling better. Pt has a temporary job and needs the letter to return back to work on Monday 14th. Please respond to pool: CLAUDIA ARMSTRONG LPN/SELWYN - please send letter to pt via [email or web address] per pt request. Thank you.

## 2020-12-11 NOTE — TELEPHONE ENCOUNTER
Verified name and . Patient reports that during the telemedicine visit on 20 she discussed her symptoms with you and that you both agreed that she did not need to be tested for COVID-19. Please advise and thank you.

## 2020-12-11 NOTE — TELEPHONE ENCOUNTER
Spoke to patient  , state  Forgot to tell me  Lest visit time she had 11 /27 some symptoms of congestion and postnasal drainage   patient states she had the same symptoms like about every year so far in the allergy medication Allegra always helped patient was taking the medicine with relief of the symptoms  Patient denies any fever chillsOr cough, body aches, fatigue       Patient stated she needed a letter to go back to work in. Per work day would let her go back on this coming Monday      Since his was more than  2 weeks with patient symptoms at this time COVID-19 would not recommend be recommended -also patient refused to take it states she is sure she had allergy symptoms she has it every year and helped her .   Follow  cdc   Guidelines   Pt education    Letter placed in the system

## 2020-12-13 ENCOUNTER — LAB ENCOUNTER (OUTPATIENT)
Dept: LAB | Facility: HOSPITAL | Age: 55
End: 2020-12-13
Attending: INTERNAL MEDICINE
Payer: MEDICAID

## 2020-12-13 DIAGNOSIS — D64.9 ANEMIA, UNSPECIFIED TYPE: ICD-10-CM

## 2020-12-13 DIAGNOSIS — E11.9 TYPE 2 DIABETES MELLITUS WITHOUT COMPLICATION, WITHOUT LONG-TERM CURRENT USE OF INSULIN (HCC): ICD-10-CM

## 2020-12-13 DIAGNOSIS — E78.5 HYPERLIPIDEMIA, UNSPECIFIED HYPERLIPIDEMIA TYPE: ICD-10-CM

## 2020-12-13 PROCEDURE — 83540 ASSAY OF IRON: CPT

## 2020-12-13 PROCEDURE — 82728 ASSAY OF FERRITIN: CPT

## 2020-12-13 PROCEDURE — 80053 COMPREHEN METABOLIC PANEL: CPT

## 2020-12-13 PROCEDURE — 82570 ASSAY OF URINE CREATININE: CPT

## 2020-12-13 PROCEDURE — 80061 LIPID PANEL: CPT

## 2020-12-13 PROCEDURE — 82043 UR ALBUMIN QUANTITATIVE: CPT

## 2020-12-13 PROCEDURE — 36415 COLL VENOUS BLD VENIPUNCTURE: CPT

## 2020-12-13 PROCEDURE — 85025 COMPLETE CBC W/AUTO DIFF WBC: CPT

## 2020-12-13 PROCEDURE — 82607 VITAMIN B-12: CPT

## 2020-12-13 PROCEDURE — 84443 ASSAY THYROID STIM HORMONE: CPT

## 2020-12-13 PROCEDURE — 83036 HEMOGLOBIN GLYCOSYLATED A1C: CPT

## 2020-12-13 PROCEDURE — 82746 ASSAY OF FOLIC ACID SERUM: CPT

## 2020-12-13 PROCEDURE — 84466 ASSAY OF TRANSFERRIN: CPT

## 2020-12-20 ENCOUNTER — TELEPHONE (OUTPATIENT)
Dept: INTERNAL MEDICINE CLINIC | Facility: CLINIC | Age: 55
End: 2020-12-20

## 2020-12-20 DIAGNOSIS — E53.8 VITAMIN B12 DEFICIENCY: ICD-10-CM

## 2020-12-20 DIAGNOSIS — D64.9 ANEMIA, UNSPECIFIED TYPE: Primary | ICD-10-CM

## 2020-12-20 RX ORDER — THIAMINE HCL 100 MG
TABLET ORAL
Qty: 90 TABLET | Refills: 3 | Status: SHIPPED | OUTPATIENT
Start: 2020-12-20 | End: 2021-08-19

## 2020-12-20 RX ORDER — FOLIC ACID 1 MG/1
1 TABLET ORAL DAILY
Qty: 90 TABLET | Refills: 3 | Status: SHIPPED | OUTPATIENT
Start: 2020-12-20

## 2020-12-28 NOTE — TELEPHONE ENCOUNTER
Pharmacy is asking to refill Metformin 1000 MB tablets, take 1 tab by mouth twice daily before meals. I did not see this on her Current List of Meds.

## 2021-01-06 ENCOUNTER — TELEPHONE (OUTPATIENT)
Dept: INTERNAL MEDICINE CLINIC | Facility: CLINIC | Age: 56
End: 2021-01-06

## 2021-01-06 ENCOUNTER — MED REC SCAN ONLY (OUTPATIENT)
Dept: INTERNAL MEDICINE CLINIC | Facility: CLINIC | Age: 56
End: 2021-01-06

## 2021-01-06 NOTE — TELEPHONE ENCOUNTER
Pt stopped by [place] office and dropped off Parking Placard she needs filled out. Per pt please call when form ready for . Form placed in Dr Patel Footman folder.

## 2021-01-06 NOTE — TELEPHONE ENCOUNTER
Parking placard has been completed and signed. Placed at [facility]  for .

## 2021-02-01 ENCOUNTER — TELEPHONE (OUTPATIENT)
Dept: INTERNAL MEDICINE CLINIC | Facility: CLINIC | Age: 56
End: 2021-02-01

## 2021-02-01 NOTE — TELEPHONE ENCOUNTER
Patient is requesting to speak directly with nurse in Dr. Kwan Neumann office only. When prompted for more information, patient states she only wanted to speak with nurse.

## 2021-02-26 NOTE — TELEPHONE ENCOUNTER
Current Outpatient Medications:     •  atorvastatin 40 MG Oral Tab, Take 1 tablet (40 mg total) by mouth nightly., Disp: 30 tablet, Rfl: 0

## 2021-02-27 RX ORDER — ATORVASTATIN CALCIUM 40 MG/1
40 TABLET, FILM COATED ORAL NIGHTLY
Qty: 90 TABLET | Refills: 1 | Status: SHIPPED | OUTPATIENT
Start: 2021-02-27 | End: 2021-07-08

## 2021-03-01 NOTE — TELEPHONE ENCOUNTER
•  metFORMIN HCl 1000 MG Oral Tab, Take 1 tablet (1,000 mg total) by mouth 2 (two) times daily before meals. , Disp: 60 tablet, Rfl: 1

## 2021-03-10 ENCOUNTER — TELEPHONE (OUTPATIENT)
Dept: INTERNAL MEDICINE CLINIC | Facility: CLINIC | Age: 56
End: 2021-03-10

## 2021-03-10 NOTE — TELEPHONE ENCOUNTER
Patient requesting to speak with Dr. Ray Kaur nurse regarding some issues. Patient did not want to disclose anything and asking for a call back. Please advise.

## 2021-03-11 NOTE — TELEPHONE ENCOUNTER
S/w pt. I returned pts phone call to inquire what issues she needed to be answered. She informed me she had some questions about the COVID vaccine, what the process is to schedule the vaccination for herself.   Informed her that once she is eligible she w

## 2021-03-30 ENCOUNTER — IMMUNIZATION (OUTPATIENT)
Dept: LAB | Facility: HOSPITAL | Age: 56
End: 2021-03-30
Attending: HOSPITALIST
Payer: MEDICAID

## 2021-03-30 DIAGNOSIS — Z23 NEED FOR VACCINATION: Primary | ICD-10-CM

## 2021-03-30 PROCEDURE — 0011A SARSCOV2 VAC 100MCG/0.5ML IM: CPT

## 2021-03-31 NOTE — TELEPHONE ENCOUNTER
Current Outpatient Medications:     •  Losartan Potassium-HCTZ 100-25 MG Oral Tab, Take 1 tablet by mouth daily. , Disp: 90 tablet, Rfl: 0  •

## 2021-04-01 RX ORDER — LOSARTAN POTASSIUM AND HYDROCHLOROTHIAZIDE 25; 100 MG/1; MG/1
1 TABLET ORAL DAILY
Qty: 90 TABLET | Refills: 1 | Status: SHIPPED | OUTPATIENT
Start: 2021-04-01 | End: 2021-07-08

## 2021-04-27 ENCOUNTER — IMMUNIZATION (OUTPATIENT)
Dept: LAB | Facility: HOSPITAL | Age: 56
End: 2021-04-27
Attending: EMERGENCY MEDICINE
Payer: MEDICAID

## 2021-04-27 DIAGNOSIS — Z23 NEED FOR VACCINATION: Primary | ICD-10-CM

## 2021-04-27 PROCEDURE — 0012A SARSCOV2 VAC 100MCG/0.5ML IM: CPT

## 2021-07-08 ENCOUNTER — OFFICE VISIT (OUTPATIENT)
Dept: INTERNAL MEDICINE CLINIC | Facility: CLINIC | Age: 56
End: 2021-07-08
Payer: MEDICAID

## 2021-07-08 VITALS
SYSTOLIC BLOOD PRESSURE: 109 MMHG | OXYGEN SATURATION: 98 % | BODY MASS INDEX: 37.28 KG/M2 | DIASTOLIC BLOOD PRESSURE: 78 MMHG | WEIGHT: 232 LBS | HEIGHT: 66 IN | HEART RATE: 87 BPM

## 2021-07-08 DIAGNOSIS — J30.9 ALLERGIC RHINITIS, UNSPECIFIED SEASONALITY, UNSPECIFIED TRIGGER: ICD-10-CM

## 2021-07-08 DIAGNOSIS — Z12.31 SCREENING MAMMOGRAM, ENCOUNTER FOR: ICD-10-CM

## 2021-07-08 DIAGNOSIS — E11.621 TYPE 2 DIABETES MELLITUS WITH FOOT ULCER, WITHOUT LONG-TERM CURRENT USE OF INSULIN (HCC): ICD-10-CM

## 2021-07-08 DIAGNOSIS — E78.00 PURE HYPERCHOLESTEROLEMIA: ICD-10-CM

## 2021-07-08 DIAGNOSIS — L97.509 TYPE 2 DIABETES MELLITUS WITH FOOT ULCER, WITHOUT LONG-TERM CURRENT USE OF INSULIN (HCC): ICD-10-CM

## 2021-07-08 DIAGNOSIS — E53.8 VITAMIN B12 DEFICIENCY: ICD-10-CM

## 2021-07-08 DIAGNOSIS — Z01.419 ENCNTR FOR GYN EXAM (GENERAL) (ROUTINE) W/O ABN FINDINGS: ICD-10-CM

## 2021-07-08 DIAGNOSIS — K63.5 POLYP OF COLON, UNSPECIFIED PART OF COLON, UNSPECIFIED TYPE: ICD-10-CM

## 2021-07-08 DIAGNOSIS — Z00.00 PHYSICAL EXAM: Primary | ICD-10-CM

## 2021-07-08 DIAGNOSIS — D64.9 ANEMIA, UNSPECIFIED TYPE: ICD-10-CM

## 2021-07-08 DIAGNOSIS — E55.9 VITAMIN D DEFICIENCY: ICD-10-CM

## 2021-07-08 DIAGNOSIS — D57.3 SICKLE CELL TRAIT (HCC): ICD-10-CM

## 2021-07-08 DIAGNOSIS — I10 ESSENTIAL HYPERTENSION: ICD-10-CM

## 2021-07-08 PROCEDURE — 99396 PREV VISIT EST AGE 40-64: CPT | Performed by: INTERNAL MEDICINE

## 2021-07-08 PROCEDURE — 3074F SYST BP LT 130 MM HG: CPT | Performed by: INTERNAL MEDICINE

## 2021-07-08 PROCEDURE — 99213 OFFICE O/P EST LOW 20 MIN: CPT | Performed by: INTERNAL MEDICINE

## 2021-07-08 PROCEDURE — 3008F BODY MASS INDEX DOCD: CPT | Performed by: INTERNAL MEDICINE

## 2021-07-08 PROCEDURE — 3078F DIAST BP <80 MM HG: CPT | Performed by: INTERNAL MEDICINE

## 2021-07-08 RX ORDER — LOSARTAN POTASSIUM AND HYDROCHLOROTHIAZIDE 25; 100 MG/1; MG/1
1 TABLET ORAL
Qty: 90 TABLET | Refills: 1 | Status: SHIPPED | OUTPATIENT
Start: 2021-07-08

## 2021-07-08 RX ORDER — CYCLOBENZAPRINE HCL 10 MG
5 TABLET ORAL NIGHTLY PRN
Qty: 30 TABLET | Refills: 0 | Status: SHIPPED | OUTPATIENT
Start: 2021-07-08 | End: 2021-09-30

## 2021-07-08 RX ORDER — ATORVASTATIN CALCIUM 40 MG/1
40 TABLET, FILM COATED ORAL NIGHTLY
Qty: 90 TABLET | Refills: 1 | Status: SHIPPED | OUTPATIENT
Start: 2021-07-08

## 2021-07-08 NOTE — PROGRESS NOTES
HPI:    Patient ID: Kyung Johnston is a 63year old female. Patient presents with:  Physical      Physical exam , HTN   Patient states feeling well otherwise.  Denies chest pain, shortnesss of breath, palpitations, or abdominal pain, denies UTI symptoms fev TABLET(1000 MG) BY MOUTH TWICE DAILY BEFORE MEALS 180 tablet 0   • Cyanocobalamin (VITAMIN B-12) 2500 MCG Sublingual SL Tab Take 1  Tab under the tongue daily 90 tablet 3   • folic acid 1 MG Oral Tab Take 1 tablet (1 mg total) by mouth daily.  90 tablet 3 Lisinopril              UNKNOWN   PHYSICAL EXAM:   Physical Exam  Vitals and nursing note reviewed. Constitutional:       General: She is not in acute distress. Appearance: She is well-developed.       Comments: Obese    HENT:      Head: diet  Keep good hydration  Maintain a regular activity /walking as tolerated   Complete labs as ordered,   Mammogram  Ordered , colonoscopy  Overdue - referred   Preventative health maintenance tests reviewed had  Hysterectomy  w tbo -  2005  Annual gyne e D      Meds This Visit:  Requested Prescriptions     Signed Prescriptions Disp Refills   • Losartan Potassium-HCTZ 100-25 MG Oral Tab 90 tablet 1     Sig: Take 1 tablet by mouth once daily.    • atorvastatin 40 MG Oral Tab 90 tablet 1     Sig: Take 1 tablet

## 2021-08-02 ENCOUNTER — TELEPHONE (OUTPATIENT)
Dept: INTERNAL MEDICINE CLINIC | Facility: CLINIC | Age: 56
End: 2021-08-02

## 2021-08-02 DIAGNOSIS — M25.562 LEFT KNEE PAIN, UNSPECIFIED CHRONICITY: Primary | ICD-10-CM

## 2021-08-02 NOTE — TELEPHONE ENCOUNTER
Patient came into [facility] office to drop off form for Dr. Cassie Buitrago. Informed patient I will leave it in her folder and dr would take a look at it tomorrow 08/03/21.

## 2021-08-03 NOTE — TELEPHONE ENCOUNTER
S/w Dr. Kb Parks. Stts that pt will need to see ortho for the knee brace. Also, if she needs the parking placard to be changed to permanent she will need to speak to ortho as well. As of now, we will keep parking placard as temporary.

## 2021-08-04 ENCOUNTER — MED REC SCAN ONLY (OUTPATIENT)
Dept: INTERNAL MEDICINE CLINIC | Facility: CLINIC | Age: 56
End: 2021-08-04

## 2021-08-04 NOTE — TELEPHONE ENCOUNTER
Parking placard completed and signed by Dr. Frankey Bowl. Placed at the [facility]  for . 1-800-DENTIST message sent to pt.

## 2021-08-04 NOTE — TELEPHONE ENCOUNTER
Routed to Site staff for assistance, thanks.       Future Appointments   Date Time Provider Yvon Maddox   8/7/2021  9:00 AM [facility] SCHEDULED RESOURCE [address] [facility] LAB EM [facility]   8/7/2021  9:40 AM [address]

## 2021-08-07 ENCOUNTER — LAB ENCOUNTER (OUTPATIENT)
Dept: LAB | Facility: HOSPITAL | Age: 56
End: 2021-08-07
Attending: INTERNAL MEDICINE
Payer: MEDICAID

## 2021-08-07 ENCOUNTER — HOSPITAL ENCOUNTER (OUTPATIENT)
Dept: MAMMOGRAPHY | Facility: HOSPITAL | Age: 56
Discharge: HOME | End: 2021-08-07
Attending: INTERNAL MEDICINE
Payer: MEDICAID

## 2021-08-07 DIAGNOSIS — E55.9 VITAMIN D DEFICIENCY: ICD-10-CM

## 2021-08-07 DIAGNOSIS — E78.00 PURE HYPERCHOLESTEROLEMIA: ICD-10-CM

## 2021-08-07 DIAGNOSIS — I10 ESSENTIAL HYPERTENSION: ICD-10-CM

## 2021-08-07 DIAGNOSIS — Z12.31 SCREENING MAMMOGRAM, ENCOUNTER FOR: ICD-10-CM

## 2021-08-07 DIAGNOSIS — D64.9 ANEMIA, UNSPECIFIED TYPE: ICD-10-CM

## 2021-08-07 DIAGNOSIS — Z00.00 PHYSICAL EXAM: ICD-10-CM

## 2021-08-07 LAB
ALBUMIN SERPL-MCNC: 3.5 G/DL (ref 3.4–5)
ALBUMIN/GLOB SERPL: 0.8 {RATIO} (ref 1–2)
ALP LIVER SERPL-CCNC: 98 U/L
ALT SERPL-CCNC: 22 U/L
ANION GAP SERPL CALC-SCNC: 9 MMOL/L (ref 0–18)
AST SERPL-CCNC: 13 U/L (ref 15–37)
BASOPHILS # BLD AUTO: 0.07 X10(3) UL (ref 0–0.2)
BASOPHILS NFR BLD AUTO: 0.8 %
BILIRUB SERPL-MCNC: 0.5 MG/DL (ref 0.1–2)
BUN BLD-MCNC: 22 MG/DL (ref 7–18)
BUN/CREAT SERPL: 22.7 (ref 10–20)
CALCIUM BLD-MCNC: 9.2 MG/DL (ref 8.5–10.1)
CHLORIDE SERPL-SCNC: 104 MMOL/L (ref 98–112)
CHOLEST SMN-MCNC: 241 MG/DL (ref ?–200)
CO2 SERPL-SCNC: 26 MMOL/L (ref 21–32)
CREAT BLD-MCNC: 0.97 MG/DL
DEPRECATED HBV CORE AB SER IA-ACNC: 44.4 NG/ML
DEPRECATED RDW RBC AUTO: 42.2 FL (ref 35.1–46.3)
EOSINOPHIL # BLD AUTO: 0.21 X10(3) UL (ref 0–0.7)
EOSINOPHIL NFR BLD AUTO: 2.5 %
ERYTHROCYTE [DISTWIDTH] IN BLOOD BY AUTOMATED COUNT: 14.4 % (ref 11–15)
EST. AVERAGE GLUCOSE BLD GHB EST-MCNC: 140 MG/DL (ref 68–126)
GLOBULIN PLAS-MCNC: 4.2 G/DL (ref 2.8–4.4)
GLUCOSE BLD-MCNC: 129 MG/DL (ref 70–99)
HBA1C MFR BLD HPLC: 6.5 % (ref ?–5.7)
HCT VFR BLD AUTO: 33.7 %
HDLC SERPL-MCNC: 51 MG/DL (ref 40–59)
HGB BLD-MCNC: 10.5 G/DL
IMM GRANULOCYTES # BLD AUTO: 0.03 X10(3) UL (ref 0–1)
IMM GRANULOCYTES NFR BLD: 0.4 %
LDLC SERPL CALC-MCNC: 172 MG/DL (ref ?–100)
LYMPHOCYTES # BLD AUTO: 3.52 X10(3) UL (ref 1–4)
LYMPHOCYTES NFR BLD AUTO: 41.9 %
M PROTEIN MFR SERPL ELPH: 7.7 G/DL (ref 6.4–8.2)
MCH RBC QN AUTO: 24.9 PG (ref 26–34)
MCHC RBC AUTO-ENTMCNC: 31.2 G/DL (ref 31–37)
MCV RBC AUTO: 79.9 FL
MONOCYTES # BLD AUTO: 0.58 X10(3) UL (ref 0.1–1)
MONOCYTES NFR BLD AUTO: 6.9 %
NEUTROPHILS # BLD AUTO: 4 X10 (3) UL (ref 1.5–7.7)
NEUTROPHILS # BLD AUTO: 4 X10(3) UL (ref 1.5–7.7)
NEUTROPHILS NFR BLD AUTO: 47.5 %
NONHDLC SERPL-MCNC: 190 MG/DL (ref ?–130)
OSMOLALITY SERPL CALC.SUM OF ELEC: 293 MOSM/KG (ref 275–295)
PATIENT FASTING Y/N/NP: YES
PATIENT FASTING Y/N/NP: YES
PLATELET # BLD AUTO: 366 10(3)UL (ref 150–450)
POTASSIUM SERPL-SCNC: 4.2 MMOL/L (ref 3.5–5.1)
RBC # BLD AUTO: 4.22 X10(6)UL
SODIUM SERPL-SCNC: 139 MMOL/L (ref 136–145)
TRIGL SERPL-MCNC: 102 MG/DL (ref 30–149)
TSI SER-ACNC: 2.35 MIU/ML (ref 0.36–3.74)
VIT B12 SERPL-MCNC: 252 PG/ML (ref 193–986)
VIT D+METAB SERPL-MCNC: 14 NG/ML (ref 30–100)
VLDLC SERPL CALC-MCNC: 20 MG/DL (ref 0–30)
WBC # BLD AUTO: 8.4 X10(3) UL (ref 4–11)

## 2021-08-07 PROCEDURE — 85025 COMPLETE CBC W/AUTO DIFF WBC: CPT

## 2021-08-07 PROCEDURE — 83036 HEMOGLOBIN GLYCOSYLATED A1C: CPT

## 2021-08-07 PROCEDURE — 80053 COMPREHEN METABOLIC PANEL: CPT

## 2021-08-07 PROCEDURE — 77063 BREAST TOMOSYNTHESIS BI: CPT | Performed by: INTERNAL MEDICINE

## 2021-08-07 PROCEDURE — 84443 ASSAY THYROID STIM HORMONE: CPT

## 2021-08-07 PROCEDURE — 82607 VITAMIN B-12: CPT

## 2021-08-07 PROCEDURE — 80061 LIPID PANEL: CPT

## 2021-08-07 PROCEDURE — 82306 VITAMIN D 25 HYDROXY: CPT

## 2021-08-07 PROCEDURE — 82728 ASSAY OF FERRITIN: CPT

## 2021-08-07 PROCEDURE — 36415 COLL VENOUS BLD VENIPUNCTURE: CPT

## 2021-08-07 PROCEDURE — 77067 SCR MAMMO BI INCL CAD: CPT | Performed by: INTERNAL MEDICINE

## 2021-08-17 ENCOUNTER — PATIENT MESSAGE (OUTPATIENT)
Dept: PODIATRY CLINIC | Facility: CLINIC | Age: 56
End: 2021-08-17

## 2021-08-17 NOTE — TELEPHONE ENCOUNTER
From: Latanya Onofre  To: Rishi Thorne DPM  Sent: 8/17/2021 12:46 PM CDT  Subject: Other    Hello     I need schedule a appointment regarding my feet.    Preferably for morning like 9:00/9:30 am

## 2021-08-18 ENCOUNTER — TELEPHONE (OUTPATIENT)
Dept: FAMILY MEDICINE CLINIC | Facility: CLINIC | Age: 56
End: 2021-08-18

## 2021-08-18 ENCOUNTER — APPOINTMENT (OUTPATIENT)
Dept: GENERAL RADIOLOGY | Facility: HOSPITAL | Age: 56
End: 2021-08-18
Attending: EMERGENCY MEDICINE
Payer: MEDICAID

## 2021-08-18 ENCOUNTER — HOSPITAL ENCOUNTER (EMERGENCY)
Facility: HOSPITAL | Age: 56
Discharge: HOME | End: 2021-08-18
Attending: EMERGENCY MEDICINE
Payer: MEDICAID

## 2021-08-18 VITALS
HEIGHT: 65 IN | TEMPERATURE: 98 F | RESPIRATION RATE: 16 BRPM | BODY MASS INDEX: 38.65 KG/M2 | WEIGHT: 232 LBS | OXYGEN SATURATION: 98 % | DIASTOLIC BLOOD PRESSURE: 81 MMHG | SYSTOLIC BLOOD PRESSURE: 122 MMHG | HEART RATE: 88 BPM

## 2021-08-18 DIAGNOSIS — M54.40 BACK PAIN OF LUMBAR REGION WITH SCIATICA: ICD-10-CM

## 2021-08-18 DIAGNOSIS — E53.8 VITAMIN B12 DEFICIENCY: ICD-10-CM

## 2021-08-18 DIAGNOSIS — M54.6 CHRONIC THORACIC BACK PAIN, UNSPECIFIED BACK PAIN LATERALITY: Primary | ICD-10-CM

## 2021-08-18 DIAGNOSIS — G89.29 CHRONIC THORACIC BACK PAIN, UNSPECIFIED BACK PAIN LATERALITY: Primary | ICD-10-CM

## 2021-08-18 PROCEDURE — 99283 EMERGENCY DEPT VISIT LOW MDM: CPT

## 2021-08-18 PROCEDURE — 72072 X-RAY EXAM THORAC SPINE 3VWS: CPT | Performed by: EMERGENCY MEDICINE

## 2021-08-18 PROCEDURE — 72100 X-RAY EXAM L-S SPINE 2/3 VWS: CPT | Performed by: EMERGENCY MEDICINE

## 2021-08-18 RX ORDER — LIDOCAINE 50 MG/G
1 PATCH TOPICAL EVERY 24 HOURS
Qty: 6 PATCH | Refills: 0 | Status: SHIPPED | OUTPATIENT
Start: 2021-08-18 | End: 2021-08-24

## 2021-08-18 RX ORDER — FERROUS SULFATE 324(65)MG
TABLET, DELAYED RELEASE (ENTERIC COATED) ORAL
Qty: 180 TABLET | Refills: 0 | Status: SHIPPED | OUTPATIENT
Start: 2021-08-18 | End: 2021-11-13

## 2021-08-18 RX ORDER — HYDROCODONE BITARTRATE AND ACETAMINOPHEN 5; 325 MG/1; MG/1
0.5 TABLET ORAL EVERY 6 HOURS PRN
Qty: 5 TABLET | Refills: 0 | Status: SHIPPED | OUTPATIENT
Start: 2021-08-18

## 2021-08-18 NOTE — ED PROVIDER NOTES
Patient Seen in: [facility] AND [facility] Emergency Department      History   Patient presents with:  Back Pain    Stated Complaint: back pain radiating to BLE    HPI/Subjective:   HPI    62-year-old whose had several falls over the last several years with the (Temporal)   Resp 16   Ht 165.1 cm (5' 5\")   Wt 105.2 kg   SpO2 98%   BMI 38.61 kg/m²         Physical Exam    Constitutional: Oriented to person, place, and time. Appears well-developed. No distress. Head: Normocephalic and atraumatic.    Eyes: Conjunc degenerative disc changes are seen throughout the midthoracic spine, most prominent at T7-T8 where there is a moderate disc osteophyte complex. Smaller disc osteophyte complexes noted at T5-T6 and T8-T9.   Partially visualized lower cervical spine demonstr Surgical clips in the right upper quadrant. Another surgical clip in the left pelvis. CONCLUSION:   Mild-to-moderate degenerative disc and joint disease of the lumbar spine. Degenerative disc changes are worst at T12-L1 and L1-L2.   Degenerative SHOULD BE BIOPSIED. For patients over the age of 36, the target due date for the patient's next mammogram has been entered into a reminder system.    Patient received a discharge summary from the technologist after completion of exam.  Breast marker clif

## 2021-08-18 NOTE — ED INITIAL ASSESSMENT (HPI)
Pt reports falling three months ago and states generalized back pain since then. Pt reports the pain has become severe over the past two-three weeks.

## 2021-08-19 RX ORDER — THIAMINE HCL 100 MG
TABLET ORAL
Qty: 90 TABLET | Refills: 3 | Status: SHIPPED | OUTPATIENT
Start: 2021-08-19

## 2021-08-19 NOTE — TELEPHONE ENCOUNTER
Spoke with patient ( verified) and relayed Dr. Bryanna Allen message below--patient verbalizes understanding and agreement. Patient never picked up B12 ordered 2020, as it was sent to the wrong pharmacy [facility]).     Re-pended to go to [facility]

## 2021-08-24 ENCOUNTER — TELEPHONE (OUTPATIENT)
Dept: INTERNAL MEDICINE CLINIC | Facility: CLINIC | Age: 56
End: 2021-08-24

## 2021-08-24 DIAGNOSIS — M54.9 BACK PAIN, UNSPECIFIED BACK LOCATION, UNSPECIFIED BACK PAIN LATERALITY, UNSPECIFIED CHRONICITY: Primary | ICD-10-CM

## 2021-08-24 RX ORDER — LIDOCAINE 50 MG/G
1 PATCH TOPICAL EVERY 24 HOURS
Qty: 10 PATCH | Refills: 0 | Status: SHIPPED | OUTPATIENT
Start: 2021-08-24 | End: 2021-09-03

## 2021-08-24 NOTE — TELEPHONE ENCOUNTER
Patient complains of pain in lower back and hips rated  a 10/10. Patient was in the ER on 8/18/21 and was given Lidocaine 5% patch and Norco 5/325. Per patient, the Yoly Furl did not help with her pain. However, the Lidocaine 5% patch helped tremendously and she was able to move without any pain. Patient ran out of patch and states it is very difficulty for her to sit and stand. Patient is scheduled to see Dr. Nury Coleman on 9/2/21. In the meantime, patient is requesting  that a refill for Lidocaine 5% patch be sent to her pharmacy.      Dr. Dinah Rose:   Please see pended script and sign

## 2021-08-24 NOTE — TELEPHONE ENCOUNTER
Patient is requesting a call back to discuss her ER visit. [facility] offered appointment with Dr. Liberty Kerr, patient declined. Please advise.

## 2021-09-02 ENCOUNTER — OFFICE VISIT (OUTPATIENT)
Dept: PHYSICAL MEDICINE AND REHAB | Facility: CLINIC | Age: 56
End: 2021-09-02
Payer: MEDICAID

## 2021-09-02 VITALS
HEIGHT: 65 IN | WEIGHT: 220 LBS | SYSTOLIC BLOOD PRESSURE: 126 MMHG | BODY MASS INDEX: 36.65 KG/M2 | HEART RATE: 92 BPM | RESPIRATION RATE: 20 BRPM | DIASTOLIC BLOOD PRESSURE: 86 MMHG

## 2021-09-02 DIAGNOSIS — M25.561 CHRONIC PAIN OF BOTH KNEES: Primary | ICD-10-CM

## 2021-09-02 DIAGNOSIS — G89.29 CHRONIC PAIN OF BOTH KNEES: Primary | ICD-10-CM

## 2021-09-02 DIAGNOSIS — M25.562 CHRONIC PAIN OF BOTH KNEES: Primary | ICD-10-CM

## 2021-09-02 DIAGNOSIS — M47.816 LUMBAR FACET ARTHROPATHY: ICD-10-CM

## 2021-09-02 PROCEDURE — 99204 OFFICE O/P NEW MOD 45 MIN: CPT | Performed by: PHYSICAL MEDICINE & REHABILITATION

## 2021-09-02 PROCEDURE — 3008F BODY MASS INDEX DOCD: CPT | Performed by: PHYSICAL MEDICINE & REHABILITATION

## 2021-09-02 PROCEDURE — 3074F SYST BP LT 130 MM HG: CPT | Performed by: PHYSICAL MEDICINE & REHABILITATION

## 2021-09-02 PROCEDURE — 3079F DIAST BP 80-89 MM HG: CPT | Performed by: PHYSICAL MEDICINE & REHABILITATION

## 2021-09-02 RX ORDER — PREDNISONE 20 MG/1
TABLET ORAL
Qty: 5 TABLET | Refills: 0 | Status: SHIPPED | OUTPATIENT
Start: 2021-09-02

## 2021-09-02 NOTE — H&P
History and Physical    C/C: low back pain, bilateral knee pain    HPI: 62 yo f with h/o DM presents with acute on chronic low back pain.  I previously saw her about 3 years ago with similar complaints; she recently developed acute low back pain after she m Psychiatric  Anxiety: admits  Depressed Mood: denies     Physical exam:  BMI 36.61  /86  HR 92  RR 20     Lumbar spine exam:    No pain with lumbar flexion. + pain with lumbar extension. + tenderness to palpation lumbar paraspinals. + ttp PSIS.   5 comparison of 2019.             Assessment and plan:  1) lumbar facet arthropathy, ? SIJ pain  2) chronic bilateral knee pain  3) h/o DM    Recommend physical therapy for neutral to flexion lumbar stabilization. XR bilateral knees, including WB views.  DME

## 2021-09-13 ENCOUNTER — MED REC SCAN ONLY (OUTPATIENT)
Dept: INTERNAL MEDICINE CLINIC | Facility: CLINIC | Age: 56
End: 2021-09-13

## 2021-09-18 ENCOUNTER — HOSPITAL ENCOUNTER (OUTPATIENT)
Dept: GENERAL RADIOLOGY | Facility: HOSPITAL | Age: 56
Discharge: HOME | End: 2021-09-18
Attending: PHYSICAL MEDICINE & REHABILITATION
Payer: MEDICAID

## 2021-09-18 DIAGNOSIS — M25.561 CHRONIC PAIN OF BOTH KNEES: ICD-10-CM

## 2021-09-18 DIAGNOSIS — G89.29 CHRONIC PAIN OF BOTH KNEES: ICD-10-CM

## 2021-09-18 DIAGNOSIS — M25.562 CHRONIC PAIN OF BOTH KNEES: ICD-10-CM

## 2021-09-18 PROCEDURE — 73562 X-RAY EXAM OF KNEE 3: CPT | Performed by: PHYSICAL MEDICINE & REHABILITATION

## 2021-09-21 ENCOUNTER — NURSE TRIAGE (OUTPATIENT)
Dept: INTERNAL MEDICINE CLINIC | Facility: CLINIC | Age: 56
End: 2021-09-21

## 2021-09-21 DIAGNOSIS — B34.9 VIRAL SYNDROME: Primary | ICD-10-CM

## 2021-09-21 RX ORDER — CYCLOBENZAPRINE HCL 10 MG
5 TABLET ORAL NIGHTLY PRN
Qty: 30 TABLET | Refills: 0 | Status: CANCELLED | OUTPATIENT
Start: 2021-09-21

## 2021-09-21 NOTE — TELEPHONE ENCOUNTER
Patient called asking if she could have a refill of Cyclobenzaprine and different prescription for her back pain. She states [place] prescribed in ER 8/18/21 is not helping with her back pain. Patient states she has followed up with  9/2/21.   P[place]am

## 2021-09-21 NOTE — TELEPHONE ENCOUNTER
Recommend patient still to have the COVID-19 test order signed    Meantime can increase fluids-small sips of the water every 10 to 15 minutes  Warm soups /tea without caffeine-     can take Tylenol 500 mg 3- 4 times daily for sore throat and other symptoms

## 2021-09-21 NOTE — TELEPHONE ENCOUNTER
Left message to call back on patient's voicemail. Mychart message sent. Please also see refill request/condition update.

## 2021-09-22 ENCOUNTER — PATIENT MESSAGE (OUTPATIENT)
Dept: OTHER | Age: 56
End: 2021-09-22

## 2021-09-22 ENCOUNTER — LAB ENCOUNTER (OUTPATIENT)
Dept: LAB | Facility: HOSPITAL | Age: 56
End: 2021-09-22
Attending: INTERNAL MEDICINE
Payer: MEDICAID

## 2021-09-22 DIAGNOSIS — B34.9 VIRAL SYNDROME: ICD-10-CM

## 2021-09-22 NOTE — TELEPHONE ENCOUNTER
Spoke with pt and MD message below given. Pt verb understanding'  Central scheduling number provided to pt.

## 2021-09-23 LAB — SARS-COV-2 RNA RESP QL NAA+PROBE: NOT DETECTED

## 2021-09-23 NOTE — TELEPHONE ENCOUNTER
From: Daniel Varner  To: Herman Grace  Sent: 9/21/2021 5:18 PM CDT  Subject: New test ordered    Loreto Nelson,    I tried calling you today to let you know what  recommends for sore throat and raspy voice.     recommends you to take T

## 2021-09-23 NOTE — TELEPHONE ENCOUNTER
OneTouch message sent to pt.          Delivery Read From Message Type Attachments Subject   9/21/2021  5:18 PM Randee Hyman RN Patient Medical Advice Request  New test ordered

## 2021-09-27 ENCOUNTER — NURSE TRIAGE (OUTPATIENT)
Dept: INTERNAL MEDICINE CLINIC | Facility: CLINIC | Age: 56
End: 2021-09-27

## 2021-09-27 ENCOUNTER — TELEPHONE (OUTPATIENT)
Dept: INTERNAL MEDICINE CLINIC | Facility: CLINIC | Age: 56
End: 2021-09-27

## 2021-09-27 DIAGNOSIS — Z11.52 ENCOUNTER FOR SCREENING FOR COVID-19: Primary | ICD-10-CM

## 2021-09-27 NOTE — TELEPHONE ENCOUNTER
Action Requested: Summary for Provider     []  Critical Lab, Recommendations Needed  [] Need Additional Advice  []   FYI    []   Need Orders  [] Need Medications Sent to Pharmacy  []  Other     SUMMARY: pt going to ER for weakness.     Pt states last two we

## 2021-09-27 NOTE — TELEPHONE ENCOUNTER
Patient called because she has not been feeling well for 2 weeks. Patient still has chills, body aches, no taste or smell, mucous, recent diarrhea/vomiting. Patient did get a Covid test on 9/22 which was negative. Patient is vaccinated.      Patient is

## 2021-09-28 ENCOUNTER — TELEPHONE (OUTPATIENT)
Dept: INTERNAL MEDICINE CLINIC | Facility: CLINIC | Age: 56
End: 2021-09-28

## 2021-09-28 DIAGNOSIS — B34.9 VIRAL SYNDROME: Primary | ICD-10-CM

## 2021-09-28 NOTE — TELEPHONE ENCOUNTER
I recommend patient to have the COVID-19 testing if it is negative patient should be seen in office please schedule appointment for patient      Order for COVID-19 was signed-pt  To schedule apt

## 2021-09-28 NOTE — TELEPHONE ENCOUNTER
Called patient in regards to message below , asking why does she need to have another COVID test?    She was with a sick nephew last week, she had COVID  symptoms herself on 9/22 and was Negative     Reports she remains with diarrhea, chills, no sense of t

## 2021-09-29 NOTE — TELEPHONE ENCOUNTER
Patient has a COVID-19 symptoms    Since COVID-19 was negative it is okay to recheck it-and confirm eather test is negative or positive .     Order placed in the system

## 2021-09-30 NOTE — TELEPHONE ENCOUNTER
From   Jerson Baker RN To   Alana Arvind and Delivered   9/29/2021  8:12 AM   Last Read in MyChart   9/29/2021  8:13 AM by Ezra Story

## 2021-09-30 NOTE — TELEPHONE ENCOUNTER
Patient calling to follow up on refill for cyclobenzaprine. Patient states that takes it for muscle pain/body aches. Requesting a refill for 90 days. Please advise.

## 2021-09-30 NOTE — TELEPHONE ENCOUNTER
Please Review.  Protocol failed or has no protocol  Requested Prescriptions   Pending Prescriptions Disp Refills    CYCLOBENZAPRINE 10 MG Oral Tab [Pharmacy Med Name: CYCLOBENZAPRINE 10MG TABLETS] 270 tablet 0     Sig: TAKE 1 TABLET BY MOUTH THREE TIMES CHAZ

## 2021-10-01 RX ORDER — CYCLOBENZAPRINE HCL 10 MG
10 TABLET ORAL NIGHTLY PRN
Qty: 30 TABLET | Refills: 0 | Status: SHIPPED | OUTPATIENT
Start: 2021-10-01 | End: 2021-11-13

## 2021-10-22 ENCOUNTER — TELEPHONE (OUTPATIENT)
Dept: INTERNAL MEDICINE CLINIC | Facility: CLINIC | Age: 56
End: 2021-10-22

## 2021-10-22 NOTE — TELEPHONE ENCOUNTER
Patient called asking for Dr. Jorge Taylor opinion on if she should get the [facility] booster vaccine. Please advise.      Patient was also advised those eligible for a third dose of Moderna include certain moderately to severely immunocompromised individual

## 2021-10-23 NOTE — TELEPHONE ENCOUNTER
Agreed with triage nurse advice given   Pt does not qualify yet for Covid 19 booster shot yet .    thx

## 2021-10-25 ENCOUNTER — PATIENT MESSAGE (OUTPATIENT)
Dept: OTHER | Age: 56
End: 2021-10-25

## 2021-11-10 NOTE — TELEPHONE ENCOUNTER
Patient is requesting a refill.       cyclobenzaprine 10 MG Oral Tab  Ferrous Sulfate 324 MG Oral Tab EC  ibuprofen 600 MG Oral Tab

## 2021-11-13 ENCOUNTER — IMMUNIZATION (OUTPATIENT)
Dept: LAB | Facility: HOSPITAL | Age: 56
End: 2021-11-13
Attending: EMERGENCY MEDICINE
Payer: MEDICAID

## 2021-11-13 DIAGNOSIS — Z23 NEED FOR VACCINATION: Primary | ICD-10-CM

## 2021-11-13 PROCEDURE — 0064A SARSCOV2 VAC 50MCG/0.25ML IM: CPT | Performed by: NURSE PRACTITIONER

## 2021-11-13 RX ORDER — CYCLOBENZAPRINE HCL 10 MG
10 TABLET ORAL NIGHTLY PRN
Qty: 30 TABLET | Refills: 0 | Status: SHIPPED | OUTPATIENT
Start: 2021-11-13

## 2021-11-13 RX ORDER — FERROUS SULFATE 324(65)MG
TABLET, DELAYED RELEASE (ENTERIC COATED) ORAL
Qty: 180 TABLET | Refills: 0 | Status: SHIPPED | OUTPATIENT
Start: 2021-11-13

## 2021-12-01 NOTE — TELEPHONE ENCOUNTER
Refill passed per [employer] [facility], [facility] protocol.   Requested Prescriptions   Pending Prescriptions Disp Refills    METFORMIN HCL 1000 MG Oral Tab [Pharmacy Med Name: METFORMIN 1000MG TABLETS] 180 tablet 0     Sig: TAKE 1 TABLET(1000 MG) BY MOUTH TWICE DAILY BEFORE MEALS        Diabetes Medication Protocol Passed - 12/1/2021  8:06 AM        Passed - Last A1C < 7.5 and within past 6 months     Lab Results   Component Value Date    A1C 6.5 (H) 08/07/2021               Passed - Appointment in past 6 or next 3 months        Passed - GFR  > 50     Lab Results   Component Value Date    GFRAA 76 08/07/2021                 Passed - GFR in the past 12 months               Recent Outpatient Visits              3 months ago [facility]    Office Visit    4 months ago Physical exam    Janis Carrel, MD    Office Visit    11 months ago Essential hypertension    [place] , Jagdish Morris MD    Telemedicine    1 year ago Essential hypertension    Armin Eddy, [place], MD    [facility]    1 year ago Chronic pain of left knee    [facility] [place]    Office Visit

## 2021-12-08 ENCOUNTER — NURSE TRIAGE (OUTPATIENT)
Dept: INTERNAL MEDICINE CLINIC | Facility: CLINIC | Age: 56
End: 2021-12-08

## 2021-12-08 ENCOUNTER — TELEMEDICINE (OUTPATIENT)
Dept: FAMILY MEDICINE CLINIC | Facility: CLINIC | Age: 56
End: 2021-12-08

## 2021-12-08 DIAGNOSIS — J39.9 UPPER RESPIRATORY DISEASE: Primary | ICD-10-CM

## 2021-12-08 DIAGNOSIS — R11.0 NAUSEA: ICD-10-CM

## 2021-12-08 DIAGNOSIS — G44.201 ACUTE INTRACTABLE TENSION-TYPE HEADACHE: ICD-10-CM

## 2021-12-08 PROCEDURE — 99213 OFFICE O/P EST LOW 20 MIN: CPT | Performed by: PHYSICIAN ASSISTANT

## 2021-12-08 RX ORDER — ONDANSETRON 4 MG/1
4 TABLET, FILM COATED ORAL EVERY 8 HOURS PRN
Qty: 20 TABLET | Refills: 0 | Status: SHIPPED | OUTPATIENT
Start: 2021-12-08

## 2021-12-08 RX ORDER — BUTALBITAL, ACETAMINOPHEN AND CAFFEINE 300; 40; 50 MG/1; MG/1; MG/1
1 CAPSULE ORAL EVERY 4 HOURS PRN
Qty: 30 CAPSULE | Refills: 0 | Status: SHIPPED | OUTPATIENT
Start: 2021-12-08 | End: 2021-12-22

## 2021-12-08 NOTE — TELEPHONE ENCOUNTER
Action Requested: Summary for Provider     []  Critical Lab, Recommendations Needed  [] Need Additional Advice  []   FYI    []   Need Orders  [] Need Medications Sent to Pharmacy  []  Other     SUMMARY:video appt made -c/c Pt stated received Booster vaccin

## 2021-12-09 NOTE — PROGRESS NOTES
HPI: HPI  Patient states that she received Booster vaccine last month, then patient started having headache, nausea until now. Patient has tried Tylenol and Naproxen without improvement.   Patient denies of chest pain, SOB, N/V/C/D, fever, dizziness, sy Take 1 tablet (10 mg total) by mouth nightly. 30 tablet 3   • GABAPENTIN 100 MG Oral Cap TAKE 1 CAPSULE(100 MG) BY MOUTH EVERY NIGHT 30 capsule 1   • Fexofenadine HCl (ALLEGRA ALLERGY) 180 MG Oral Tab Take 1 tablet (180 mg total) by mouth daily.  Take 1 tab History:     Past Surgical History:   Procedure Laterality Date   • Appendectomy     • Appendectomy     • Cholecystectomy     • Colonoscopy N/A 9/14/2017    Procedure: COLONOSCOPY;  Surgeon: Reji Sauceda MD;  Location: [address] [place] [address] ENDOSCOPY   • Hysterectomy of Systems:   Review of Systems   Constitutional: Negative for activity change, chills, fatigue and fever. HENT: Negative for congestion, ear discharge, ear pain, postnasal drip, rhinorrhea, sinus pressure, sinus pain and sore throat.     Respiratory: Neg

## 2021-12-11 ENCOUNTER — LAB ENCOUNTER (OUTPATIENT)
Dept: LAB | Facility: HOSPITAL | Age: 56
End: 2021-12-11
Attending: PHYSICIAN ASSISTANT
Payer: MEDICAID

## 2021-12-11 DIAGNOSIS — J39.9 UPPER RESPIRATORY DISEASE: ICD-10-CM

## 2021-12-13 ENCOUNTER — TELEPHONE (OUTPATIENT)
Dept: INTERNAL MEDICINE CLINIC | Facility: CLINIC | Age: 56
End: 2021-12-13

## 2021-12-13 NOTE — TELEPHONE ENCOUNTER
Patient calling for covid test result, advised still in process. Also advised result will go to [facility].

## 2022-02-23 RX ORDER — ATORVASTATIN CALCIUM 40 MG/1
TABLET, FILM COATED ORAL
Qty: 90 TABLET | Refills: 1 | Status: SHIPPED | OUTPATIENT
Start: 2022-02-23

## 2022-02-23 NOTE — TELEPHONE ENCOUNTER
Please review.  Protocol Failed or has no Protocol  Requested Prescriptions   Pending Prescriptions Disp Refills    ATORVASTATIN 40 MG Oral Tab [Pharmacy Med Name: ATORVASTATIN 40MG TABLETS] 90 tablet 1     Sig: TAKE 1 TABLET(40 MG) BY MOUTH EVERY NIGHT        Cholesterol Medication Protocol Failed - 2/23/2022  3:01 PM        Failed - Last LDL < 130     Lab Results   Component Value Date     (H) 08/07/2021               Passed - ALT in past 12 months        Passed - LDL in past 12 months        Passed - Last ALT < 80       Lab Results   Component Value Date    ALT 22 08/07/2021             Passed - Appointment in past 12 or next 3 months              Recent Outpatient Visits              2 months ago Upper respiratory disease    [facility] Chauncey Sheridan PA-C    Telemedicine    5 months ago Chronic pain of both knees    [facility] Humberto Daniels,     Office Visit    7 months ago Physical exam    Ryne Nation MD    Office Visit    1 year ago Essential hypertension    [address] Yesi Elam MD    Telemedicine    1 year ago Essential hypertension    Gerianne Custard, [place] Eli Cook, MD    [facility]

## 2022-03-21 ENCOUNTER — TELEPHONE (OUTPATIENT)
Dept: INTERNAL MEDICINE CLINIC | Facility: CLINIC | Age: 57
End: 2022-03-21

## 2022-03-21 NOTE — TELEPHONE ENCOUNTER
Patient dropped off a sealed envelope for Dr Luana Miner. Envelope placed in doctor's folder at [address].

## 2022-03-24 RX ORDER — FERROUS SULFATE 324(65)MG
TABLET, DELAYED RELEASE (ENTERIC COATED) ORAL
Qty: 180 TABLET | Refills: 0 | Status: CANCELLED | OUTPATIENT
Start: 2022-03-24

## 2022-03-24 RX ORDER — FEXOFENADINE HCL 180 MG/1
180 TABLET ORAL DAILY
Qty: 30 TABLET | Refills: 1 | Status: CANCELLED | OUTPATIENT
Start: 2022-03-24

## 2022-03-24 RX ORDER — CYCLOBENZAPRINE HCL 10 MG
10 TABLET ORAL NIGHTLY PRN
Qty: 30 TABLET | Refills: 0 | Status: CANCELLED | OUTPATIENT
Start: 2022-03-24

## 2022-03-24 RX ORDER — LOSARTAN POTASSIUM AND HYDROCHLOROTHIAZIDE 25; 100 MG/1; MG/1
1 TABLET ORAL
Qty: 90 TABLET | Refills: 1 | Status: CANCELLED | OUTPATIENT
Start: 2022-03-24

## 2022-03-24 RX ORDER — ATORVASTATIN CALCIUM 40 MG/1
40 TABLET, FILM COATED ORAL NIGHTLY
Qty: 90 TABLET | Refills: 1 | Status: CANCELLED | OUTPATIENT
Start: 2022-03-24

## 2022-03-24 RX ORDER — FOLIC ACID 1 MG/1
1 TABLET ORAL DAILY
Qty: 90 TABLET | Refills: 3 | Status: CANCELLED | OUTPATIENT
Start: 2022-03-24

## 2022-03-24 RX ORDER — FEXOFENADINE HCL 180 MG/1
180 TABLET ORAL
Qty: 90 TABLET | Refills: 1 | Status: SHIPPED | OUTPATIENT
Start: 2022-03-24

## 2022-03-24 RX ORDER — ATORVASTATIN CALCIUM 40 MG/1
40 TABLET, FILM COATED ORAL NIGHTLY
Qty: 90 TABLET | Refills: 1 | OUTPATIENT
Start: 2022-03-24

## 2022-03-24 NOTE — TELEPHONE ENCOUNTER
Patient is calling to follow up on status of forms for placard for vehicle completion. Please advise patient when complete for  at site.   Phone #[phone]

## 2022-03-24 NOTE — TELEPHONE ENCOUNTER
Form rec'd and not completed or signed by MD. Ej Limon in green folder for her to review and sign.   Sent [employer] message to pt

## 2022-03-25 ENCOUNTER — TELEPHONE (OUTPATIENT)
Dept: INTERNAL MEDICINE CLINIC | Facility: CLINIC | Age: 57
End: 2022-03-25

## 2022-03-25 ENCOUNTER — PATIENT MESSAGE (OUTPATIENT)
Dept: FAMILY MEDICINE CLINIC | Facility: CLINIC | Age: 57
End: 2022-03-25

## 2022-03-25 DIAGNOSIS — D57.3 SICKLE CELL TRAIT (HCC): ICD-10-CM

## 2022-03-25 DIAGNOSIS — E53.8 VITAMIN B 12 DEFICIENCY: ICD-10-CM

## 2022-03-25 DIAGNOSIS — E11.9 TYPE 2 DIABETES MELLITUS WITHOUT COMPLICATION, WITHOUT LONG-TERM CURRENT USE OF INSULIN (HCC): Primary | ICD-10-CM

## 2022-03-25 DIAGNOSIS — D64.9 ANEMIA, UNSPECIFIED TYPE: ICD-10-CM

## 2022-03-25 RX ORDER — FOLIC ACID 1 MG/1
1 TABLET ORAL DAILY
Qty: 90 TABLET | Refills: 1 | Status: SHIPPED | OUTPATIENT
Start: 2022-03-25

## 2022-03-25 RX ORDER — CYCLOBENZAPRINE HCL 10 MG
10 TABLET ORAL NIGHTLY PRN
Qty: 30 TABLET | Refills: 0 | Status: SHIPPED | OUTPATIENT
Start: 2022-03-25

## 2022-03-25 RX ORDER — LOSARTAN POTASSIUM AND HYDROCHLOROTHIAZIDE 25; 100 MG/1; MG/1
1 TABLET ORAL
Qty: 90 TABLET | Refills: 1 | Status: SHIPPED | OUTPATIENT
Start: 2022-03-25

## 2022-03-25 RX ORDER — FERROUS SULFATE 324(65)MG
TABLET, DELAYED RELEASE (ENTERIC COATED) ORAL
Qty: 180 TABLET | Refills: 1 | Status: SHIPPED | OUTPATIENT
Start: 2022-03-25

## 2022-03-25 NOTE — TELEPHONE ENCOUNTER
Reached pt on phone and relayed message. Instructed pt to fast 10-12 hours, may drink plain water, black coffee and take necessary medication     To stop biotin supplements 3 days prior due to TSH test ordered. Pt verbalized understanding and agreement and will make appt once she completes tests.

## 2022-03-25 NOTE — TELEPHONE ENCOUNTER
Refill passed per [place] [facility] protocol. Requested Prescriptions   Pending Prescriptions Disp Refills    atorvastatin 40 MG Oral Tab 90 tablet 1     Sig: Take 1 tablet (40 mg total) by mouth nightly. Cholesterol Medication Protocol Failed - 3/24/2022 10:05 PM        Failed - Last LDL < 130     Lab Results   Component Value Date     (H) 08/07/2021               Passed - ALT in past 12 months        Passed - LDL in past 12 months        Passed - Last ALT < 80       Lab Results   Component Value Date    ALT 22 08/07/2021             Passed - Appointment in past 12 or next 3 months           metFORMIN HCl 1000 MG Oral Tab 180 tablet 1     Sig: Take 1 tablet (1,000 mg total) by mouth 2 (two) times daily before meals. Diabetes Medication Protocol Failed - 3/24/2022 10:05 PM        Failed - Last A1C < 7.5 and within past 6 months     Lab Results   Component Value Date    A1C 6.5 (H) 08/07/2021               Failed - Appointment in past 6 or next 3 months        Passed - GFR  > 50     Lab Results   Component Value Date    GFRAA 76 08/07/2021                 Passed - GFR in the past 12 months           cyclobenzaprine 10 MG Oral Tab 30 tablet 0     Sig: Take 1 tablet (10 mg total) by mouth nightly as needed for Muscle spasms. There is no refill protocol information for this order        Ferrous Sulfate 324 MG Oral Tab  tablet 0     Sig: Take orally  1 tab twice per day        There is no refill protocol information for this order        folic acid 1 MG Oral Tab 90 tablet 3     Sig: Take 1 tablet (1 mg total) by mouth daily. There is no refill protocol information for this order        losartan-hydroCHLOROthiazide 100-25 MG Oral Tab 90 tablet 1     Sig: Take 1 tablet by mouth once daily.         Hypertensive Medications Protocol Failed - 3/24/2022 10:05 PM        Failed - Appointment in past 6 or next 3 months        Passed - CMP or BMP in past 12 months Passed - GFR  > 50     Lab Results   Component Value Date    GFRAA 76 08/07/2021                    fexofenadine (ALLEGRA ALLERGY) 180 MG Oral Tab 30 tablet 1     Sig: Take 1 tablet (180 mg total) by mouth daily. Take 1 tablet once daily for -2 -3  weeks and then as needed.         Allergy Medication Protocol Passed - 3/24/2022 10:05 PM        Passed - Appoinment in past 12 or next 3 months                    Recent Outpatient Visits              3 months ago Upper respiratory disease    [facility] Fatimah Castillo PA-C    Telemedicine    6 months ago Chronic pain of both knees    [facility] Arnulfo Uribe DO    Office Visit    8 months ago Physical exam    Romelia Fulton MD    Office Visit    1 year ago Essential hypertension    Rajesh [place] Adrianne Canada MD    Telemedicine    1 year ago [facility]    [id number] [facility]    [facility]

## 2022-03-25 NOTE — TELEPHONE ENCOUNTER
With POP UP HIGH WARNING  for losartan     See medication record=atorvastatin still with refill available. CSS-please call for an appointment for further refills. Please review; protocol failed/no protocol. Requested Prescriptions   Pending Prescriptions Disp Refills    metFORMIN HCl 1000 MG Oral Tab 180 tablet 1     Sig: Take 1 tablet (1,000 mg total) by mouth 2 (two) times daily before meals. Diabetes Medication Protocol Failed - 3/24/2022 10:08 PM        Failed - Last A1C < 7.5 and within past 6 months     Lab Results   Component Value Date    A1C 6.5 (H) 08/07/2021               Failed - Appointment in past 6 or next 3 months        Passed - GFR  > 50     Lab Results   Component Value Date    GFR 76 08/07/2021                 Passed - GFR in the past 12 months           cyclobenzaprine 10 MG Oral Tab 30 tablet 0     Sig: Take 1 tablet (10 mg total) by mouth nightly as needed for Muscle spasms. There is no refill protocol information for this order        Ferrous Sulfate 324 MG Oral Tab  tablet 0     Sig: Take orally  1 tab twice per day        There is no refill protocol information for this order        folic acid 1 MG Oral Tab 90 tablet 3     Sig: Take 1 tablet (1 mg total) by mouth daily. There is no refill protocol information for this order        losartan-hydroCHLOROthiazide 100-25 MG Oral Tab 90 tablet 1     Sig: Take 1 tablet by mouth once daily. Hypertensive Medications Protocol Failed - 3/24/2022 10:08 PM        Failed - Appointment in past 6 or next 3 months        Passed - CMP or BMP in past 12 months        Passed - GFR  > 50     Lab Results   Component Value Date    [facility] 76 08/07/2021                   Signed Prescriptions Disp Refills    fexofenadine (ALLEGRA ALLERGY) 180 MG Oral Tab 90 tablet 1     Sig: Take 1 tablet (180 mg total) by mouth daily as needed.  Take 1 tablet once daily for -2 -3  weeks and then as needed. Allergy Medication Protocol Passed - 3/24/2022 10:05 PM        Passed - Appoinment in past 12 or next 3 months          Refused Prescriptions Disp Refills    atorvastatin 40 MG Oral Tab 90 tablet 1     Sig: Take 1 tablet (40 mg total) by mouth nightly.         Cholesterol Medication Protocol Failed - 3/24/2022 10:08 PM        Failed - Last LDL < 130     Lab Results   Component Value Date     (H) 08/07/2021               Passed - ALT in past 12 months        Passed - LDL in past 12 months        Passed - Last ALT < 80       Lab Results   Component Value Date    ALT 22 08/07/2021             Passed - Appointment in past 12 or next 3 months               Recent Outpatient Visits              3 months ago Upper respiratory disease    [address] [facility] Virgilio Jeronimo PA-C    Telemedicine    6 months ago Chronic pain of both knees    [address] [facility] Vilma Escamilla DO    Office Visit    8 months ago Physical exam    Elizabeth Soliman MD    Office Visit    1 year ago Essential hypertension    Danyelle Maya [place] Aida Cooper MD    Telemedicine    1 year ago Essential hypertension    Taya Urbano [place] Cooper MD    [facility]

## 2022-03-25 NOTE — TELEPHONE ENCOUNTER
I can complete the form at this time  Please, inform patient form to be completed in future by Dr. Suman Shaw who treats patient for Her chronic back pains and knee pains

## 2022-03-28 ENCOUNTER — PATIENT MESSAGE (OUTPATIENT)
Dept: ADMINISTRATIVE | Age: 57
End: 2022-03-28

## 2022-03-28 ENCOUNTER — MED REC SCAN ONLY (OUTPATIENT)
Dept: INTERNAL MEDICINE CLINIC | Facility: CLINIC | Age: 57
End: 2022-03-28

## 2022-03-28 NOTE — TELEPHONE ENCOUNTER
1st attempt - Unfoldt message sent for patient to contact the office to schedule an appointment; see notes below.

## 2022-03-28 NOTE — TELEPHONE ENCOUNTER
SAY:  I spoke with patient and informed her of your message below. Informed her that parking placard should be completed by Dr. Suman Shaw in the near future. She informed me that the  informed her that form should be completed by PCP. She will reach out to Dr. Suman Shaw as well. I placed form at the  [facility] location for . She verbalized understanding.

## 2022-09-22 DIAGNOSIS — D64.9 ANEMIA, UNSPECIFIED TYPE: ICD-10-CM

## 2022-09-22 RX ORDER — FOLIC ACID 1 MG/1
1 TABLET ORAL DAILY
Qty: 90 TABLET | Refills: 1 | Status: SHIPPED | OUTPATIENT
Start: 2022-09-22

## 2022-09-22 NOTE — TELEPHONE ENCOUNTER
Please Review. Protocol Failed or has no protocol.        Requested Prescriptions   Pending Prescriptions Disp Refills    FOLIC ACID 1 MG Oral Tab [Pharmacy Med Name: FOLIC ACID 1MG TABLETS] 90 tablet 1     Sig: TAKE 1 TABLET(1 MG) BY MOUTH DAILY        There is no refill protocol information for this order         Recent Outpatient Visits              9 months ago Upper respiratory disease    [facility] Benny Campbell PA-C    Telemedicine    1 year ago Chronic pain of both knees    [address] [facility] Raymond Medrano DO    Office Visit    1 year ago Physical exam    Jenniffer Boone MD    Office Visit    1 year ago Essential hypertension    Samanta [place] Gama Hernandez MD    Telemedicine    2 years ago Essential hypertension    Lovina Ng, [place] Herschel Gauze, MD    [facility]

## 2022-10-10 DIAGNOSIS — E78.00 PURE HYPERCHOLESTEROLEMIA: ICD-10-CM

## 2022-10-10 DIAGNOSIS — I10 ESSENTIAL HYPERTENSION: ICD-10-CM

## 2022-10-11 DIAGNOSIS — I10 ESSENTIAL HYPERTENSION: ICD-10-CM

## 2022-10-11 DIAGNOSIS — E78.00 PURE HYPERCHOLESTEROLEMIA: ICD-10-CM

## 2022-10-11 DIAGNOSIS — D64.9 ANEMIA, UNSPECIFIED TYPE: ICD-10-CM

## 2022-10-11 RX ORDER — ATORVASTATIN CALCIUM 40 MG/1
TABLET, FILM COATED ORAL
Qty: 90 TABLET | Refills: 1 | OUTPATIENT
Start: 2022-10-11

## 2022-10-11 RX ORDER — LOSARTAN POTASSIUM AND HYDROCHLOROTHIAZIDE 25; 100 MG/1; MG/1
TABLET ORAL
Qty: 90 TABLET | Refills: 1 | OUTPATIENT
Start: 2022-10-11

## 2022-10-14 DIAGNOSIS — I10 ESSENTIAL HYPERTENSION: ICD-10-CM

## 2022-10-14 RX ORDER — LOSARTAN POTASSIUM AND HYDROCHLOROTHIAZIDE 25; 100 MG/1; MG/1
TABLET ORAL
Qty: 90 TABLET | Refills: 1 | OUTPATIENT
Start: 2022-10-14

## 2022-10-14 NOTE — TELEPHONE ENCOUNTER
Future Appointments   Date Time Provider Yvon Maddox   10/28/2022 10:40 AM Aida Cooper MD [facility] [place]         Please review; Protocol Failed / No protocol. Requested Prescriptions   Pending Prescriptions Disp Refills    atorvastatin 40 MG Oral Tab 90 tablet 1     Sig: Take 1 tablet (40 mg total) by mouth nightly. Cholesterol Medication Protocol Failed - 10/14/2022  3:02 PM        Failed - ALT in past 12 months        Failed - LDL in past 12 months        Failed - Last ALT < 80       Lab Results   Component Value Date    ALT 22 08/07/2021             Failed - Last LDL < 130     Lab Results   Component Value Date     (H) 08/07/2021               Passed - In person appointment or virtual visit in the past 12 mos or appointment in next 3 mos       Recent Outpatient Visits              10 months ago Upper respiratory disease    [facility] Virgilio Jeronimo PA-C    Telemedicine    1 year ago Chronic pain of both knees    [facility]    Office Visit    1 year ago Physical exam    Elizabeth Soliman MD    Office Visit    1 year ago Essential hypertension    Alexandra Reid Seltjarnarnes, MD    Telemedicine    2 years ago Essential hypertension    Taya Urbano, Myriam Mckeon MD    Virtual Phone E/M     Future Appointments         Provider Department Appt Notes    In 2 weeks Aida Cooper MD [address], [address], [place]                  metFORMIN HCl 1000 MG Oral Tab 180 tablet 1     Sig: Take 1 tablet (1,000 mg total) by mouth 2 (two) times daily before meals.         Diabetes Medication Protocol Failed - 10/14/2022  3:02 PM        Failed - Last A1C < 7.5 and within past 6 months     Lab Results   Component Value Date    A1C 6.5 (H) 08/07/2021 Failed - EGFRCR or GFRAA > 50     GFR Evaluation              Failed - GFR in the past 12 months        Passed - In person appointment or virtual visit in the past 6 mos or appointment in next 3 mos       Recent Outpatient Visits              10 months ago Upper respiratory disease    [address] [facility], [facility]    Telemedicine    1 year ago Chronic pain of both knees    [address] [facility] Flory Franco DO    Office Visit    1 year ago Physical exam    Milind Pal MD    Office Visit    1 year ago Essential hypertension    Alexandra Gonzalez Seltjarnarnes, MD    Telemedicine    2 years ago Essential hypertension    [place], [place] Sylvie Canales MD    Virtual Phone E/M     Future Appointments         Provider Department Appt Notes    In 2 weeks Sylvie Canales MD [facility], [address], [place]                  cyclobenzaprine 10 MG Oral Tab 30 tablet 0     Sig: Take 1 tablet (10 mg total) by mouth nightly as needed for Muscle spasms. There is no refill protocol information for this order        Ferrous Sulfate 324 MG Oral Tab  tablet 1     Sig: Take orally  1 tab twice per day        There is no refill protocol information for this order        losartan-hydroCHLOROthiazide 100-25 MG Oral Tab 90 tablet 1     Sig: Take 1 tablet by mouth once daily. Hypertensive Medications Protocol Failed - 10/14/2022  3:02 PM        Failed - CMP or BMP in past 6 months     No results found for this or any previous visit (from the past 4392 hour(s)).               Failed - In person appointment or virtual visit in the past 6 months       Recent Outpatient Visits              10 months ago Upper respiratory disease    [address] [facility], [facility]    Telemedicine    1 year ago Chronic pain of both knees    [address] [facility] Keenan, DO    Office Visit    1 year ago Physical exam    Freddy Bee MD    Office Visit    1 year ago Essential hypertension    [address], [address] [place] [address] Alexandra Moreira Seltjarnarnes, MD    Telemedicine    2 years ago Essential hypertension    [address] [facility], [address] [place] [address] [place] Ernie Lyons MD    Virtual Phone E/M     Future Appointments         Provider Department Appt Notes    In 2 weeks Ernie Lyons MD [address], [place]               Failed - EGFRCR or GFRAA > 50     GFR Evaluation              Passed - In person appointment in the past 12 or next 3 months       Recent Outpatient Visits              10 months ago Upper respiratory disease    1200 [facility] Sivakumar Fraga PA-C    Telemedicine    1 year ago Chronic pain of both knees    [facility] Adebayo Hussein, DO    Office Visit    1 year ago Physical exam    Freddy Bee MD    Office Visit    1 year ago Essential hypertension    Alexandra Tamayo Seltjarnarnes, MD    Telemedicine    2 years ago Essential hypertension    [address], [address] [place] [address] [place] Fleming Pfeiffer, MD    Virtual Phone E/M     Future Appointments         Provider Department Appt Notes    In 2 weeks Ernie Lyons MD [address], [place]               Passed - Last BP reading less than 140/90     BP Readings from Last 1 Encounters:  09/02/21 : 126/86                   folic acid 1 MG Oral Tab 90 tablet 1     Sig: Take 1 tablet (1 mg total) by mouth daily.         There is no refill protocol information for this order            Recent Outpatient Visits              10 months ago Upper respiratory disease    [facility] Sandra Chambers PA-C    Telemedicine    1 year ago Chronic pain of both knees    [facility] Marii Francois [facility]    Office Visit    1 year ago Physical exam    Violeta Malloy MD    Office Visit    1 year ago Essential hypertension    [facility], Alexandra AGUILAR Seltjarnarnes, MD    Telemedicine    2 years ago Essential hypertension    [facility], [place] Hollis Hinds MD    Virtual Phone E/M             Future Appointments         Provider Department Appt Notes    In 2 weeks Hollis Hinds MD [facility], [address], [place]

## 2022-10-14 NOTE — TELEPHONE ENCOUNTER
Patient was requesting medications in other encounter. Medication pended. Routing for protocol.       Future Appointments   Date Time Provider Yvon Varsha   10/28/2022 10:40 AM Elva Stephen MD [facility] [place]

## 2022-10-14 NOTE — TELEPHONE ENCOUNTER
Patient states she has not received the meds below since 7/2022 and requesting a refill due to having only two pills left.

## 2022-10-15 RX ORDER — ATORVASTATIN CALCIUM 40 MG/1
40 TABLET, FILM COATED ORAL NIGHTLY
Qty: 30 TABLET | Refills: 0 | Status: SHIPPED | OUTPATIENT
Start: 2022-10-15

## 2022-10-15 RX ORDER — FERROUS SULFATE 324(65)MG
TABLET, DELAYED RELEASE (ENTERIC COATED) ORAL
Qty: 180 TABLET | Refills: 1 | OUTPATIENT
Start: 2022-10-15

## 2022-10-15 RX ORDER — FOLIC ACID 1 MG/1
1 TABLET ORAL DAILY
Qty: 30 TABLET | Refills: 0 | Status: SHIPPED | OUTPATIENT
Start: 2022-10-15

## 2022-10-15 RX ORDER — CYCLOBENZAPRINE HCL 10 MG
10 TABLET ORAL NIGHTLY PRN
Qty: 30 TABLET | Refills: 0 | OUTPATIENT
Start: 2022-10-15

## 2022-10-15 RX ORDER — LOSARTAN POTASSIUM AND HYDROCHLOROTHIAZIDE 25; 100 MG/1; MG/1
1 TABLET ORAL
Qty: 30 TABLET | Refills: 0 | Status: SHIPPED | OUTPATIENT
Start: 2022-10-15

## 2022-10-28 ENCOUNTER — OFFICE VISIT (OUTPATIENT)
Dept: INTERNAL MEDICINE CLINIC | Facility: CLINIC | Age: 57
End: 2022-10-28
Payer: MEDICAID

## 2022-10-28 ENCOUNTER — LAB ENCOUNTER (OUTPATIENT)
Dept: LAB | Age: 57
End: 2022-10-28
Attending: INTERNAL MEDICINE
Payer: MEDICAID

## 2022-10-28 VITALS
SYSTOLIC BLOOD PRESSURE: 130 MMHG | BODY MASS INDEX: 40.32 KG/M2 | HEART RATE: 90 BPM | DIASTOLIC BLOOD PRESSURE: 86 MMHG | WEIGHT: 242 LBS | HEIGHT: 65 IN

## 2022-10-28 DIAGNOSIS — I10 ESSENTIAL HYPERTENSION: ICD-10-CM

## 2022-10-28 DIAGNOSIS — Z00.00 PE (PHYSICAL EXAM), ROUTINE: ICD-10-CM

## 2022-10-28 DIAGNOSIS — E11.9 DIABETES MELLITUS (HCC): Primary | ICD-10-CM

## 2022-10-28 DIAGNOSIS — K63.5 POLYP OF COLON, UNSPECIFIED PART OF COLON, UNSPECIFIED TYPE: Primary | ICD-10-CM

## 2022-10-28 DIAGNOSIS — M25.562 LEFT KNEE PAIN, UNSPECIFIED CHRONICITY: ICD-10-CM

## 2022-10-28 DIAGNOSIS — E53.8 VITAMIN B 12 DEFICIENCY: ICD-10-CM

## 2022-10-28 DIAGNOSIS — E78.00 PURE HYPERCHOLESTEROLEMIA: ICD-10-CM

## 2022-10-28 LAB
ALBUMIN SERPL-MCNC: 3.9 G/DL (ref 3.4–5)
ALBUMIN/GLOB SERPL: 0.9 {RATIO} (ref 1–2)
ALP LIVER SERPL-CCNC: 94 U/L
ALT SERPL-CCNC: 48 U/L
ANION GAP SERPL CALC-SCNC: 9 MMOL/L (ref 0–18)
AST SERPL-CCNC: 33 U/L (ref 15–37)
BASOPHILS # BLD AUTO: 0.06 X10(3) UL (ref 0–0.2)
BASOPHILS NFR BLD AUTO: 0.9 %
BILIRUB SERPL-MCNC: 0.7 MG/DL (ref 0.1–2)
BUN BLD-MCNC: 19 MG/DL (ref 7–18)
BUN/CREAT SERPL: 15.8 (ref 10–20)
CALCIUM BLD-MCNC: 9.7 MG/DL (ref 8.5–10.1)
CHLORIDE SERPL-SCNC: 104 MMOL/L (ref 98–112)
CHOLEST SERPL-MCNC: 192 MG/DL (ref ?–200)
CO2 SERPL-SCNC: 27 MMOL/L (ref 21–32)
CREAT BLD-MCNC: 1.2 MG/DL
DEPRECATED HBV CORE AB SER IA-ACNC: 81 NG/ML
DEPRECATED RDW RBC AUTO: 43.3 FL (ref 35.1–46.3)
EOSINOPHIL # BLD AUTO: 0.11 X10(3) UL (ref 0–0.7)
EOSINOPHIL NFR BLD AUTO: 1.7 %
ERYTHROCYTE [DISTWIDTH] IN BLOOD BY AUTOMATED COUNT: 14.9 % (ref 11–15)
EST. AVERAGE GLUCOSE BLD GHB EST-MCNC: 174 MG/DL (ref 68–126)
FASTING PATIENT LIPID ANSWER: YES
FASTING STATUS PATIENT QL REPORTED: YES
FOLATE SERPL-MCNC: >20 NG/ML (ref 8.7–?)
GFR SERPLBLD BASED ON 1.73 SQ M-ARVRAT: 52 ML/MIN/1.73M2 (ref 60–?)
GLOBULIN PLAS-MCNC: 4.3 G/DL (ref 2.8–4.4)
GLUCOSE BLD-MCNC: 182 MG/DL (ref 70–99)
HBA1C MFR BLD: 7.7 % (ref ?–5.7)
HCT VFR BLD AUTO: 37.2 %
HDLC SERPL-MCNC: 52 MG/DL (ref 40–59)
HGB BLD-MCNC: 12 G/DL
IMM GRANULOCYTES # BLD AUTO: 0.01 X10(3) UL (ref 0–1)
IMM GRANULOCYTES NFR BLD: 0.2 %
IRON SATN MFR SERPL: 18 %
IRON SERPL-MCNC: 64 UG/DL
LDLC SERPL CALC-MCNC: 120 MG/DL (ref ?–100)
LYMPHOCYTES # BLD AUTO: 2.76 X10(3) UL (ref 1–4)
LYMPHOCYTES NFR BLD AUTO: 43.1 %
MCH RBC QN AUTO: 25.6 PG (ref 26–34)
MCHC RBC AUTO-ENTMCNC: 32.3 G/DL (ref 31–37)
MCV RBC AUTO: 79.5 FL
MONOCYTES # BLD AUTO: 0.41 X10(3) UL (ref 0.1–1)
MONOCYTES NFR BLD AUTO: 6.4 %
NEUTROPHILS # BLD AUTO: 3.05 X10 (3) UL (ref 1.5–7.7)
NEUTROPHILS # BLD AUTO: 3.05 X10(3) UL (ref 1.5–7.7)
NEUTROPHILS NFR BLD AUTO: 47.7 %
NONHDLC SERPL-MCNC: 140 MG/DL (ref ?–130)
OSMOLALITY SERPL CALC.SUM OF ELEC: 297 MOSM/KG (ref 275–295)
PLATELET # BLD AUTO: 386 10(3)UL (ref 150–450)
POTASSIUM SERPL-SCNC: 4 MMOL/L (ref 3.5–5.1)
PROT SERPL-MCNC: 8.2 G/DL (ref 6.4–8.2)
RBC # BLD AUTO: 4.68 X10(6)UL
SODIUM SERPL-SCNC: 140 MMOL/L (ref 136–145)
TIBC SERPL-MCNC: 361 UG/DL (ref 240–450)
TRANSFERRIN SERPL-MCNC: 242 MG/DL (ref 200–360)
TRIGL SERPL-MCNC: 111 MG/DL (ref 30–149)
TSI SER-ACNC: 1.76 MIU/ML (ref 0.36–3.74)
VIT B12 SERPL-MCNC: 218 PG/ML (ref 193–986)
VLDLC SERPL CALC-MCNC: 19 MG/DL (ref 0–30)
WBC # BLD AUTO: 6.4 X10(3) UL (ref 4–11)

## 2022-10-28 PROCEDURE — 3079F DIAST BP 80-89 MM HG: CPT | Performed by: INTERNAL MEDICINE

## 2022-10-28 PROCEDURE — 84443 ASSAY THYROID STIM HORMONE: CPT | Performed by: INTERNAL MEDICINE

## 2022-10-28 PROCEDURE — 80061 LIPID PANEL: CPT | Performed by: INTERNAL MEDICINE

## 2022-10-28 PROCEDURE — 85025 COMPLETE CBC W/AUTO DIFF WBC: CPT | Performed by: INTERNAL MEDICINE

## 2022-10-28 PROCEDURE — 83036 HEMOGLOBIN GLYCOSYLATED A1C: CPT | Performed by: INTERNAL MEDICINE

## 2022-10-28 PROCEDURE — 82746 ASSAY OF FOLIC ACID SERUM: CPT | Performed by: INTERNAL MEDICINE

## 2022-10-28 PROCEDURE — 80053 COMPREHEN METABOLIC PANEL: CPT | Performed by: INTERNAL MEDICINE

## 2022-10-28 PROCEDURE — 84466 ASSAY OF TRANSFERRIN: CPT | Performed by: INTERNAL MEDICINE

## 2022-10-28 PROCEDURE — 82607 VITAMIN B-12: CPT | Performed by: INTERNAL MEDICINE

## 2022-10-28 PROCEDURE — 3008F BODY MASS INDEX DOCD: CPT | Performed by: INTERNAL MEDICINE

## 2022-10-28 PROCEDURE — 3075F SYST BP GE 130 - 139MM HG: CPT | Performed by: INTERNAL MEDICINE

## 2022-10-28 PROCEDURE — 36415 COLL VENOUS BLD VENIPUNCTURE: CPT | Performed by: INTERNAL MEDICINE

## 2022-10-28 PROCEDURE — 82728 ASSAY OF FERRITIN: CPT | Performed by: INTERNAL MEDICINE

## 2022-10-28 PROCEDURE — 99213 OFFICE O/P EST LOW 20 MIN: CPT | Performed by: INTERNAL MEDICINE

## 2022-10-28 PROCEDURE — 99396 PREV VISIT EST AGE 40-64: CPT | Performed by: INTERNAL MEDICINE

## 2022-10-28 PROCEDURE — 83540 ASSAY OF IRON: CPT | Performed by: INTERNAL MEDICINE

## 2022-10-28 PROCEDURE — 3051F HG A1C>EQUAL 7.0%<8.0%: CPT | Performed by: INTERNAL MEDICINE

## 2022-10-28 RX ORDER — ATORVASTATIN CALCIUM 40 MG/1
40 TABLET, FILM COATED ORAL NIGHTLY
Qty: 90 TABLET | Refills: 1 | Status: SHIPPED | OUTPATIENT
Start: 2022-10-28

## 2022-10-28 RX ORDER — LOSARTAN POTASSIUM AND HYDROCHLOROTHIAZIDE 25; 100 MG/1; MG/1
1 TABLET ORAL
Qty: 90 TABLET | Refills: 1 | Status: SHIPPED | OUTPATIENT
Start: 2022-10-28

## 2022-10-29 ENCOUNTER — LAB ENCOUNTER (OUTPATIENT)
Dept: LAB | Age: 57
End: 2022-10-29
Attending: INTERNAL MEDICINE
Payer: MEDICAID

## 2022-10-29 DIAGNOSIS — E11.9 DIABETES MELLITUS (HCC): ICD-10-CM

## 2022-10-29 LAB
CREAT UR-SCNC: 255 MG/DL
MICROALBUMIN UR-MCNC: 1.3 MG/DL
MICROALBUMIN/CREAT 24H UR-RTO: 5.1 UG/MG (ref ?–30)

## 2022-10-29 PROCEDURE — 82043 UR ALBUMIN QUANTITATIVE: CPT

## 2022-10-29 PROCEDURE — 82570 ASSAY OF URINE CREATININE: CPT

## 2023-04-01 DIAGNOSIS — D64.9 ANEMIA, UNSPECIFIED TYPE: ICD-10-CM

## 2023-04-04 RX ORDER — FOLIC ACID 1 MG/1
TABLET ORAL
Qty: 90 TABLET | Refills: 0 | Status: SHIPPED | OUTPATIENT
Start: 2023-04-04

## 2023-05-08 ENCOUNTER — TELEPHONE (OUTPATIENT)
Dept: INTERNAL MEDICINE CLINIC | Facility: CLINIC | Age: 58
End: 2023-05-08

## 2023-05-08 DIAGNOSIS — E55.9 VITAMIN D DEFICIENCY: ICD-10-CM

## 2023-05-08 DIAGNOSIS — I10 ESSENTIAL HYPERTENSION: ICD-10-CM

## 2023-05-08 DIAGNOSIS — D64.9 ANEMIA, UNSPECIFIED TYPE: ICD-10-CM

## 2023-05-08 DIAGNOSIS — E53.8 VITAMIN B 12 DEFICIENCY: ICD-10-CM

## 2023-05-08 DIAGNOSIS — E11.9 TYPE 2 DIABETES MELLITUS WITHOUT COMPLICATION, WITHOUT LONG-TERM CURRENT USE OF INSULIN (HCC): Primary | ICD-10-CM

## 2023-05-08 DIAGNOSIS — E78.00 PURE HYPERCHOLESTEROLEMIA: ICD-10-CM

## 2023-05-09 DIAGNOSIS — E78.00 PURE HYPERCHOLESTEROLEMIA: ICD-10-CM

## 2023-05-09 DIAGNOSIS — I10 ESSENTIAL HYPERTENSION: ICD-10-CM

## 2023-05-09 DIAGNOSIS — D64.9 ANEMIA, UNSPECIFIED TYPE: ICD-10-CM

## 2023-05-10 RX ORDER — ATORVASTATIN CALCIUM 40 MG/1
40 TABLET, FILM COATED ORAL NIGHTLY
Qty: 90 TABLET | Refills: 1 | OUTPATIENT
Start: 2023-05-10

## 2023-05-10 RX ORDER — LOSARTAN POTASSIUM AND HYDROCHLOROTHIAZIDE 25; 100 MG/1; MG/1
1 TABLET ORAL
Qty: 90 TABLET | Refills: 1 | OUTPATIENT
Start: 2023-05-10

## 2023-05-10 NOTE — TELEPHONE ENCOUNTER
Please review; protocol failed. Requested Prescriptions   Pending Prescriptions Disp Refills    losartan-hydroCHLOROthiazide 100-25 MG Oral Tab 90 tablet 1     Sig: Take 1 tablet by mouth once daily. Hypertensive Medications Protocol Failed - 5/9/2023 10:50 AM        Failed - CMP or BMP in past 6 months     No results found for this or any previous visit (from the past 4392 hour(s)).               Failed - In person appointment or virtual visit in the past 6 months     Recent Outpatient Visits              6 months ago Polyp of colon, unspecified part of colon, unspecified type    [address] [facility], [address], [place] Reji Gary MD    Office Visit    1 year ago Upper respiratory disease    [address],[facility], [address], [place], [employer]    Telemedicine    1 year ago Chronic pain of both knees    [facility], [address] [facility],[address], [place] Shel[place] DO    Office Visit    1 year ago Physical exam    Kristan Davis MD    Office Visit    2 years ago Essential hypertension    Hieu Davis MD    Telemedicine          Future Appointments         Provider Department Appt Notes    In 2 weeks Reji Gary MD [address],[place] [address], [address], [place] Diabetes follow up  policy inf *ba               Passed - In person appointment in the past 12 or next 3 months     Recent Outpatient Visits              6 months ago Polyp of colon, unspecified part of colon, unspecified type    [address] [facility], [address], [place] Reji Gary MD    Office Visit    1 year ago Upper respiratory disease    [address], [address], [place], [employer]    Telemedicine    1 year ago Chronic pain of both knees    [address],[facility], [address] [facility],[address], [place], [place] Office Visit    1 year ago Physical exam    Deisy Davidson MD    Office Visit    2 years ago Essential hypertension    [address] [facility] Hollis Hinds MD    Telemedicine          Future Appointments         Provider Department Appt Notes    In 2 weeks MD Jarad [facility], [address], [place] Diabetes follow up  policy inf *ba               Passed - Last BP reading less than 140/90     BP Readings from Last 1 Encounters:  10/28/22 : 130/86                Passed - EGFRCR or GFRAA > 50     GFR Evaluation  EGFRCR: 52 , resulted on 10/28/2022              atorvastatin 40 MG Oral Tab 90 tablet 1     Sig: Take 1 tablet (40 mg total) by mouth nightly.        Cholesterol Medication Protocol Passed - 5/9/2023 10:50 AM        Passed - ALT in past 12 months        Passed - LDL in past 12 months        Passed - Last ALT < 80     Lab Results   Component Value Date    ALT 48 10/28/2022             Passed - Last LDL < 130     Lab Results   Component Value Date     (H) 10/28/2022               Passed - In person appointment or virtual visit in the past 12 mos or appointment in next 3 mos     Recent Outpatient Visits              6 months ago Polyp of colon, unspecified part of colon, unspecified type    [address], [address], [place] Hollis Hinds MD    Office Visit    1 year ago Upper respiratory disease    [address], [address], [place] Darcus [place], [place]    Telemedicine    1 year ago Chronic pain of both knees    [facility], [address] [facility],[address], [place] Marii Francois DO    Office Visit    1 year ago Physical exam    [address] [facility] [place] Hollis Hinds MD    Office Visit    2 years ago Essential hypertension    [address], [address], Bhavana Medina MD    Telemedicine          Future Appointments         Provider Department Appt Notes    In 2 weeks Mary Ann Veloz MD [facility], [address], [place] Diabetes follow up  policy inf *ba                 folic acid 1 MG Oral Tab 90 tablet 0     Sig: Take 1 tablet (1 mg total) by mouth daily.        There is no refill protocol information for this order          Future Appointments         Provider Department Appt Notes    In 2 weeks Mary Ann Veloz MD [address],[facility], [address], [place] Diabetes follow up  policy inf *ba            Recent Outpatient Visits              6 months ago Polyp of colon, unspecified part of colon, unspecified type    [address],[place] [address], [address], [place] Mary Ann Veloz MD    Office Visit    1 year ago Upper respiratory disease    [address],[facility], [address], [place] Elizabethann Chi, [employer]    Telemedicine    1 year ago Chronic pain of both knees    [facility], [address] [place] [address],[address], Hieu Whyte See, DO    Office Visit    1 year ago Physical exam    Hieu Valentino MD    Office Visit    2 years ago Essential hypertension    Hieu Valentino MD    Telemedicine

## 2023-05-12 NOTE — TELEPHONE ENCOUNTER
Patient is following up on her medication refill, she has only one pill left and she has an appointment with Dr Alexis Hills on 5-24.

## 2023-05-12 NOTE — TELEPHONE ENCOUNTER
Please review; No Protocol  Rx Pended, authorize if appropriate    [I do not see that folic acid labs have been performed that are in the system]  Requested Prescriptions   Pending Prescriptions Disp Refills    folic acid 1 MG Oral Tab 90 tablet 0     Sig: Take 1 tablet (1 mg total) by mouth daily. There is no refill protocol information for this order      Refused Prescriptions Disp Refills    losartan-hydroCHLOROthiazide 100-25 MG Oral Tab 90 tablet 1     Sig: Take 1 tablet by mouth once daily. Hypertensive Medications Protocol Failed - 5/12/2023  9:24 AM        Failed - CMP or BMP in past 6 months     No results found for this or any previous visit (from the past 4392 hour(s)).               Failed - In person appointment or virtual visit in the past 6 months     Recent Outpatient Visits              6 months ago Polyp of colon, unspecified part of colon, unspecified type    [address]    Office Visit    1 year ago Upper respiratory disease    [address], [address], [place] [facility]    Telemedicine    1 year ago Chronic pain of both knees    \[facility], [address], Hieu Zhong DO    Office Visit    1 year ago Physical exam    Hieu Miranda MD    Office Visit    2 years ago Essential hypertension    Hieu Miranda MD    Telemedicine          Future Appointments         Provider Department Appt Notes    In 1 week Gabby Cavanaugh MD [address], [address], [place] Diabetes follow up  policy inf *ba               Passed - In person appointment in the past 12 or next 3 months     Recent Outpatient Visits              6 months ago Polyp of colon, unspecified part of colon, unspecified type    [facility] Jayjay Aguero MD    Office Visit    1 year ago Upper respiratory disease    [facility], [address], [place], [place]    Telemedicine    1 year ago Chronic pain of both knees    [facility], [address], [place]     Office Visit    1 year ago Physical exam    Cirilo William MD    Office Visit    2 years ago Essential hypertension    Hieu William MD    Telemedicine          Future Appointments         Provider Department Appt Notes    In 1 week Eliseo Hammer MD [facility], [address], [place] Diabetes follow up  policy inf *ba               Passed - Last BP reading less than 140/90     BP Readings from Last 1 Encounters:  10/28/22 : 130/86                Passed - EGFRCR or GFRAA > 50     GFR Evaluation  EGFRCR: 52 , resulted on 10/28/2022              atorvastatin 40 MG Oral Tab 90 tablet 1     Sig: Take 1 tablet (40 mg total) by mouth nightly.        Cholesterol Medication Protocol Passed - 5/12/2023  9:24 AM        Passed - ALT in past 12 months        Passed - LDL in past 12 months        Passed - Last ALT < 80     Lab Results   Component Value Date    ALT 48 10/28/2022             Passed - Last LDL < 130     Lab Results   Component Value Date     (H) 10/28/2022               Passed - In person appointment or virtual visit in the past 12 mos or appointment in next 3 mos     Recent Outpatient Visits              6 months ago Polyp of colon, unspecified part of colon, unspecified type    [facility] [place] Eliseo Hammer MD    Office Visit    1 year ago Upper respiratory disease    [address], [facility] [address], [place], [place]    Telemedicine    1 year ago Chronic pain of both knees    [facility], [address] [facility], [place], [place]    Office Visit    1 year ago Physical exam    Marva Miranda MD    Office Visit    2 years ago Essential hypertension    Hieu William MD    Telemedicine          Future Appointments         Provider Department Appt Notes    In 1 week Eliseo Hammer MD [employer], [address], [place] Diabetes follow up  policy inf *ba                    Recent Outpatient Visits              6 months ago Polyp of colon, unspecified part of colon, unspecified type    [employer], [address], [place] Eliseo Hammer MD    Office Visit    1 year ago Upper respiratory disease    [employer], [address], [place], [facility]    Telemedicine    1 year ago Chronic pain of both knees    [employer] [facility], [address], [facility]    Office Visit    1 year ago Physical exam    Hieu William MD    Office Visit    2 years ago Essential hypertension    Hieu William MD    Telemedicine          Future Appointments         Provider Department Appt Notes    In 1 week Eliseo Hammer MD [employer], [address], [place] Diabetes follow up  policy inf *ba

## 2023-05-13 NOTE — TELEPHONE ENCOUNTER
Patient calling to request update on medication, stated is completely out and needs medication. Stated original refill was never sent.

## 2023-05-15 RX ORDER — FOLIC ACID 1 MG/1
1 TABLET ORAL DAILY
Qty: 90 TABLET | Refills: 3 | Status: SHIPPED | OUTPATIENT
Start: 2023-05-15

## 2023-05-15 RX ORDER — LOSARTAN POTASSIUM AND HYDROCHLOROTHIAZIDE 25; 100 MG/1; MG/1
1 TABLET ORAL
Qty: 90 TABLET | Refills: 0 | Status: SHIPPED | OUTPATIENT
Start: 2023-05-15

## 2023-05-15 RX ORDER — ATORVASTATIN CALCIUM 40 MG/1
40 TABLET, FILM COATED ORAL NIGHTLY
Qty: 90 TABLET | Refills: 0 | Status: SHIPPED | OUTPATIENT
Start: 2023-05-15

## 2023-05-15 NOTE — TELEPHONE ENCOUNTER
Spoke to patient (name and  of patient verified). She is calling for an update on medication refills for losartan-hydrochlorothiazide and atorvastatin. Patient states she has not had issues in the past getting a refill especially if she has an appointment scheduled and has an upcoming appointment next week. Patient states she is out of medication and it is not safe for her to hold medication for over a week. Offered appointment for tomorrow. Patient states she needs to arrange for transportation and is not able to come tomorrow. Future Appointments   Date Time Provider Yvon Maddox   2023  2:20 PM Reji Gary MD [facility]     Dr. Elinor Singh, please advise on medication refills until appointment next week, pended for review. Thank you.

## 2023-05-15 NOTE — TELEPHONE ENCOUNTER
Patient calling to ask why these medications have been denied, she has been out of medication since 5/12/2023, please advise

## 2023-05-15 NOTE — TELEPHONE ENCOUNTER
Please review. Protocol failed/No protocol      Requested Prescriptions   Pending Prescriptions Disp Refills    folic acid 1 MG Oral Tab 90 tablet 0     Sig: Take 1 tablet (1 mg total) by mouth daily. There is no refill protocol information for this order      Refused Prescriptions Disp Refills    losartan-hydroCHLOROthiazide 100-25 MG Oral Tab 90 tablet 1     Sig: Take 1 tablet by mouth once daily. Hypertensive Medications Protocol Failed - 5/13/2023 11:27 AM        Failed - CMP or BMP in past 6 months     No results found for this or any previous visit (from the past 4392 hour(s)).               Failed - In person appointment or virtual visit in the past 6 months     Recent Outpatient Visits              6 months ago Polyp of colon, unspecified part of colon, unspecified type    [address], [address], [place] Adam Rodriguez MD    Office Visit    1 year ago Upper respiratory disease    [address], [address], [place] Dorris Andrea, [place]    Telemedicine    1 year ago Chronic pain of both knees    [facility], [address] [facility],[address], [place] Bower DO    Office Visit    1 year ago Physical exam    Hieu Maraidaga MD    Office Visit    2 years ago Essential hypertension    Hieu Maradiaga MD    Telemedicine          Future Appointments         Provider Department Appt Notes    In 1 week Adam Rodriguez MD [address], [address], [place] Diabetes follow up  policy inf *ba               Passed - In person appointment in the past 12 or next 3 months     Recent Outpatient Visits              6 months ago Polyp of colon, unspecified part of colon, unspecified type    [address] [facility]    Office Visit    1 year ago Upper respiratory disease [address], [place] Whitney Gray, [employer]    Telemedicine    1 year ago Chronic pain of both knees    [facility], [address], [place] [facility] [place]    Office Visit    1 year ago Physical exam    Katie Gonzalez MD    Office Visit    2 years ago Essential hypertension    [facility] Harper Arriaza MD    Telemedicine          Future Appointments         Provider Department Appt Notes    In 1 week Harper Arriaza MD [facility], [address], [place] Diabetes follow up  policy inf *ba               Passed - Last BP reading less than 140/90     BP Readings from Last 1 Encounters:  10/28/22 : 130/86                Passed - EGFRCR or GFRAA > 50     GFR Evaluation  EGFRCR: 52 , resulted on 10/28/2022              atorvastatin 40 MG Oral Tab 90 tablet 1     Sig: Take 1 tablet (40 mg total) by mouth nightly.        Cholesterol Medication Protocol Passed - 5/13/2023 11:27 AM        Passed - ALT in past 12 months        Passed - LDL in past 12 months        Passed - Last ALT < 80     Lab Results   Component Value Date    ALT 48 10/28/2022             Passed - Last LDL < 130     Lab Results   Component Value Date     (H) 10/28/2022               Passed - In person appointment or virtual visit in the past 12 mos or appointment in next 3 mos     Recent Outpatient Visits              6 months ago Polyp of colon, unspecified part of colon, unspecified type    [address], [address], [place] Harper Arriaza MD    Office Visit    1 year ago Upper respiratory disease    [address], [address], [place] Whitney Gray, [employer] Telemedicine    1 year ago Chronic pain of both knees    [facility], [address], Hieu Duenas DO    Office Visit    1 year ago Physical exam [place] MD    Office Visit    2 years ago Essential hypertension    [address] [facility] [place], Hieu Morejon MD    Telemedicine          Future Appointments         Provider Department Appt Notes    In 1 week Rima Morejon MD [facility], [address], [place] Diabetes follow up  policy inf *ba                    Recent Outpatient Visits              6 months ago Polyp of colon, unspecified part of colon, unspecified type    [facility], [address], [place] Rima Morejon MD    Office Visit    1 year ago Upper respiratory disease    [facility], [address], [place] Ezzie [place], [place]    Telemedicine    1 year ago Chronic pain of both knees    [facility], [address] [facility], [place] DO    Office Visit    1 year ago Physical exam    [facility]    Office Visit    2 years ago Essential hypertension    [facility] [facility]    Telemed[facility]            Future Appointments         Provider Department Appt Notes    In 1 week MD Leyla Rolon [place], [address], [place] Diabetes follow up  policy inf *ba

## 2023-05-16 NOTE — TELEPHONE ENCOUNTER
Patient informed medications were sent to pharmacy yesterday. States she called yesterday but there was medication there, states she called around 4pm. I informed patient medication was sent 659pm on 5/15. I apologized for delayed response, she states no problem she will call the pharmacy today to . She will come in next week for office visit.

## 2023-05-28 NOTE — TELEPHONE ENCOUNTER
She can wrap it with an ace. Plan 3-5 days, elevation and ice the are 3x/day for 15 minutes.  scr *ROS limited due to intoxication     Gen: No fever, normal appetite  Resp: No cough or trouble breathing  Cardiovascular: No chest pain or palpitation  Gastroenteric: No nausea/vomiting, diarrhea, constipation  :  No change in urine output; no dysuria  MS: No joint or muscle pain  Skin: No rashes  Neuro: + headache; no abnormal movements  Remainder negative, except as per the HPI

## 2023-06-10 ENCOUNTER — LAB ENCOUNTER (OUTPATIENT)
Dept: LAB | Facility: HOSPITAL | Age: 58
End: 2023-06-10
Attending: INTERNAL MEDICINE
Payer: MEDICAID

## 2023-06-10 LAB
ALBUMIN SERPL-MCNC: 3.5 G/DL (ref 3.4–5)
ALBUMIN/GLOB SERPL: 0.8 {RATIO} (ref 1–2)
ALP LIVER SERPL-CCNC: 89 U/L
ALT SERPL-CCNC: 41 U/L
ANION GAP SERPL CALC-SCNC: 8 MMOL/L (ref 0–18)
AST SERPL-CCNC: 23 U/L (ref 15–37)
BASOPHILS # BLD AUTO: 0.04 X10(3) UL (ref 0–0.2)
BASOPHILS NFR BLD AUTO: 0.6 %
BILIRUB SERPL-MCNC: 0.5 MG/DL (ref 0.1–2)
BILIRUB UR QL: NEGATIVE
BUN BLD-MCNC: 16 MG/DL (ref 7–18)
BUN/CREAT SERPL: 17.2 (ref 10–20)
CALCIUM BLD-MCNC: 9 MG/DL (ref 8.5–10.1)
CHLORIDE SERPL-SCNC: 103 MMOL/L (ref 98–112)
CHOLEST SERPL-MCNC: 209 MG/DL (ref ?–200)
CO2 SERPL-SCNC: 29 MMOL/L (ref 21–32)
CREAT BLD-MCNC: 0.93 MG/DL
CREAT UR-SCNC: 140 MG/DL
DEPRECATED HBV CORE AB SER IA-ACNC: 55.4 NG/ML
DEPRECATED RDW RBC AUTO: 45.1 FL (ref 35.1–46.3)
EOSINOPHIL # BLD AUTO: 0.24 X10(3) UL (ref 0–0.7)
EOSINOPHIL NFR BLD AUTO: 3.4 %
ERYTHROCYTE [DISTWIDTH] IN BLOOD BY AUTOMATED COUNT: 15.9 % (ref 11–15)
EST. AVERAGE GLUCOSE BLD GHB EST-MCNC: 163 MG/DL (ref 68–126)
FASTING PATIENT LIPID ANSWER: YES
FASTING STATUS PATIENT QL REPORTED: YES
FOLATE SERPL-MCNC: >20 NG/ML (ref 8.7–?)
GFR SERPLBLD BASED ON 1.73 SQ M-ARVRAT: 71 ML/MIN/1.73M2 (ref 60–?)
GLOBULIN PLAS-MCNC: 4.5 G/DL (ref 2.8–4.4)
GLUCOSE BLD-MCNC: 125 MG/DL (ref 70–99)
GLUCOSE UR-MCNC: NORMAL MG/DL
HBA1C MFR BLD: 7.3 % (ref ?–5.7)
HCT VFR BLD AUTO: 34.8 %
HDLC SERPL-MCNC: 54 MG/DL (ref 40–59)
HGB BLD-MCNC: 11 G/DL
HGB UR QL STRIP.AUTO: NEGATIVE
IMM GRANULOCYTES # BLD AUTO: 0.03 X10(3) UL (ref 0–1)
IMM GRANULOCYTES NFR BLD: 0.4 %
KETONES UR-MCNC: NEGATIVE MG/DL
LDLC SERPL CALC-MCNC: 136 MG/DL (ref ?–100)
LEUKOCYTE ESTERASE UR QL STRIP.AUTO: 75
LYMPHOCYTES # BLD AUTO: 2.71 X10(3) UL (ref 1–4)
LYMPHOCYTES NFR BLD AUTO: 38.5 %
MCH RBC QN AUTO: 25.1 PG (ref 26–34)
MCHC RBC AUTO-ENTMCNC: 31.6 G/DL (ref 31–37)
MCV RBC AUTO: 79.3 FL
MICROALBUMIN UR-MCNC: 0.95 MG/DL
MICROALBUMIN/CREAT 24H UR-RTO: 6.8 UG/MG (ref ?–30)
MONOCYTES # BLD AUTO: 0.43 X10(3) UL (ref 0.1–1)
MONOCYTES NFR BLD AUTO: 6.1 %
NEUTROPHILS # BLD AUTO: 3.58 X10 (3) UL (ref 1.5–7.7)
NEUTROPHILS # BLD AUTO: 3.58 X10(3) UL (ref 1.5–7.7)
NEUTROPHILS NFR BLD AUTO: 51 %
NITRITE UR QL STRIP.AUTO: NEGATIVE
NONHDLC SERPL-MCNC: 155 MG/DL (ref ?–130)
OSMOLALITY SERPL CALC.SUM OF ELEC: 293 MOSM/KG (ref 275–295)
PH UR: 5.5 [PH] (ref 5–8)
PLATELET # BLD AUTO: 370 10(3)UL (ref 150–450)
POTASSIUM SERPL-SCNC: 4.1 MMOL/L (ref 3.5–5.1)
PROT SERPL-MCNC: 8 G/DL (ref 6.4–8.2)
PROT UR-MCNC: NEGATIVE MG/DL
RBC # BLD AUTO: 4.39 X10(6)UL
SODIUM SERPL-SCNC: 140 MMOL/L (ref 136–145)
SP GR UR STRIP: 1.01 (ref 1–1.03)
TRIGL SERPL-MCNC: 106 MG/DL (ref 30–149)
TSI SER-ACNC: 2 MIU/ML (ref 0.36–3.74)
UROBILINOGEN UR STRIP-ACNC: NORMAL
VIT B12 SERPL-MCNC: 291 PG/ML (ref 193–986)
VIT D+METAB SERPL-MCNC: 10.3 NG/ML (ref 30–100)
VLDLC SERPL CALC-MCNC: 19 MG/DL (ref 0–30)
WBC # BLD AUTO: 7 X10(3) UL (ref 4–11)

## 2023-06-10 PROCEDURE — 84443 ASSAY THYROID STIM HORMONE: CPT | Performed by: INTERNAL MEDICINE

## 2023-06-10 PROCEDURE — 82043 UR ALBUMIN QUANTITATIVE: CPT | Performed by: INTERNAL MEDICINE

## 2023-06-10 PROCEDURE — 82746 ASSAY OF FOLIC ACID SERUM: CPT | Performed by: INTERNAL MEDICINE

## 2023-06-10 PROCEDURE — 82570 ASSAY OF URINE CREATININE: CPT | Performed by: INTERNAL MEDICINE

## 2023-06-10 PROCEDURE — 83036 HEMOGLOBIN GLYCOSYLATED A1C: CPT | Performed by: INTERNAL MEDICINE

## 2023-06-10 PROCEDURE — 80053 COMPREHEN METABOLIC PANEL: CPT | Performed by: INTERNAL MEDICINE

## 2023-06-10 PROCEDURE — 82728 ASSAY OF FERRITIN: CPT | Performed by: INTERNAL MEDICINE

## 2023-06-10 PROCEDURE — 82607 VITAMIN B-12: CPT | Performed by: INTERNAL MEDICINE

## 2023-06-10 PROCEDURE — 80061 LIPID PANEL: CPT | Performed by: INTERNAL MEDICINE

## 2023-06-10 PROCEDURE — 81001 URINALYSIS AUTO W/SCOPE: CPT | Performed by: INTERNAL MEDICINE

## 2023-06-10 PROCEDURE — 36415 COLL VENOUS BLD VENIPUNCTURE: CPT | Performed by: INTERNAL MEDICINE

## 2023-06-10 PROCEDURE — 3061F NEG MICROALBUMINURIA REV: CPT | Performed by: INTERNAL MEDICINE

## 2023-06-10 PROCEDURE — 85025 COMPLETE CBC W/AUTO DIFF WBC: CPT | Performed by: INTERNAL MEDICINE

## 2023-06-10 PROCEDURE — 82306 VITAMIN D 25 HYDROXY: CPT | Performed by: INTERNAL MEDICINE

## 2023-06-10 PROCEDURE — 3051F HG A1C>EQUAL 7.0%<8.0%: CPT | Performed by: INTERNAL MEDICINE

## 2023-06-10 PROCEDURE — 87086 URINE CULTURE/COLONY COUNT: CPT | Performed by: INTERNAL MEDICINE

## 2023-07-06 ENCOUNTER — OFFICE VISIT (OUTPATIENT)
Dept: INTERNAL MEDICINE CLINIC | Facility: CLINIC | Age: 58
End: 2023-07-06

## 2023-07-06 VITALS
SYSTOLIC BLOOD PRESSURE: 110 MMHG | WEIGHT: 242 LBS | HEART RATE: 96 BPM | HEIGHT: 65 IN | DIASTOLIC BLOOD PRESSURE: 74 MMHG | BODY MASS INDEX: 40.32 KG/M2

## 2023-07-06 DIAGNOSIS — J30.9 ALLERGIC RHINITIS, UNSPECIFIED SEASONALITY, UNSPECIFIED TRIGGER: ICD-10-CM

## 2023-07-06 DIAGNOSIS — K63.5 POLYP OF COLON, UNSPECIFIED PART OF COLON, UNSPECIFIED TYPE: ICD-10-CM

## 2023-07-06 DIAGNOSIS — E53.8 VITAMIN B12 DEFICIENCY: ICD-10-CM

## 2023-07-06 DIAGNOSIS — E11.9 TYPE 2 DIABETES MELLITUS WITHOUT COMPLICATION, WITHOUT LONG-TERM CURRENT USE OF INSULIN (HCC): Primary | ICD-10-CM

## 2023-07-06 DIAGNOSIS — M54.50 CHRONIC LOW BACK PAIN, UNSPECIFIED BACK PAIN LATERALITY, UNSPECIFIED WHETHER SCIATICA PRESENT: ICD-10-CM

## 2023-07-06 DIAGNOSIS — E78.00 PURE HYPERCHOLESTEROLEMIA: ICD-10-CM

## 2023-07-06 DIAGNOSIS — G89.29 CHRONIC LOW BACK PAIN, UNSPECIFIED BACK PAIN LATERALITY, UNSPECIFIED WHETHER SCIATICA PRESENT: ICD-10-CM

## 2023-07-06 DIAGNOSIS — Z12.31 SCREENING MAMMOGRAM, ENCOUNTER FOR: ICD-10-CM

## 2023-07-06 DIAGNOSIS — I10 ESSENTIAL HYPERTENSION: ICD-10-CM

## 2023-07-06 DIAGNOSIS — M25.562 LEFT KNEE PAIN, UNSPECIFIED CHRONICITY: ICD-10-CM

## 2023-07-06 PROCEDURE — 99214 OFFICE O/P EST MOD 30 MIN: CPT | Performed by: INTERNAL MEDICINE

## 2023-07-06 PROCEDURE — 3008F BODY MASS INDEX DOCD: CPT | Performed by: INTERNAL MEDICINE

## 2023-07-06 PROCEDURE — 3074F SYST BP LT 130 MM HG: CPT | Performed by: INTERNAL MEDICINE

## 2023-07-06 PROCEDURE — 3078F DIAST BP <80 MM HG: CPT | Performed by: INTERNAL MEDICINE

## 2023-07-06 RX ORDER — THIAMINE HCL 100 MG
TABLET ORAL
Qty: 90 TABLET | Refills: 3 | Status: SHIPPED | OUTPATIENT
Start: 2023-07-06

## 2023-07-06 RX ORDER — ATORVASTATIN CALCIUM 40 MG/1
40 TABLET, FILM COATED ORAL NIGHTLY
Qty: 90 TABLET | Refills: 1 | Status: SHIPPED | OUTPATIENT
Start: 2023-07-06

## 2023-07-06 RX ORDER — FERROUS SULFATE 324(65)MG
TABLET, DELAYED RELEASE (ENTERIC COATED) ORAL
Qty: 180 TABLET | Refills: 1 | Status: CANCELLED | OUTPATIENT
Start: 2023-07-06

## 2023-07-06 RX ORDER — ERGOCALCIFEROL 1.25 MG/1
50000 CAPSULE ORAL WEEKLY
Qty: 4 CAPSULE | Refills: 2 | Status: SHIPPED | OUTPATIENT
Start: 2023-07-06

## 2023-07-06 RX ORDER — LOSARTAN POTASSIUM AND HYDROCHLOROTHIAZIDE 25; 100 MG/1; MG/1
1 TABLET ORAL
Qty: 90 TABLET | Refills: 1 | Status: SHIPPED | OUTPATIENT
Start: 2023-07-06

## 2023-07-06 RX ORDER — FEXOFENADINE HCL 180 MG/1
180 TABLET ORAL
Qty: 90 TABLET | Refills: 1 | Status: SHIPPED | OUTPATIENT
Start: 2023-07-06

## 2023-07-06 NOTE — PROGRESS NOTES
HPI:    Patient ID: Brunilda Bradley is a 61year old female. Patient presents with:  Diabetes: Recent labs completed on 6/10/23  Hypertension      Patient presents for HTN , DM2   Patient states feeling well otherwise, but has some knee pains and lower back ,r  hip pain - has DJD    Denies chest pain, shortnesss of breath, palpitations, or abdominal pain, denies UTI symptoms fever or chills. Hypertension  This is a chronic problem. The current episode started more than 1 year ago. The problem has been gradually improving since onset. Pertinent negatives include no anxiety, blurred vision, chest pain, headaches, malaise/fatigue, neck pain, orthopnea, palpitations, peripheral edema or shortness of breath. Risk factors for coronary artery disease include obesity, post-menopausal state, diabetes mellitus and dyslipidemia. Past treatments include lifestyle changes. The current treatment provides significant improvement. Diabetes  She presents for her follow-up diabetic visit. She has type 2 diabetes mellitus. Pertinent negatives for hypoglycemia include no confusion, dizziness, headaches, nervousness/anxiousness or pallor. Pertinent negatives for diabetes include no blurred vision, no chest pain and no polyuria. Symptoms are stable. Risk factors for coronary artery disease include diabetes mellitus and post-menopausal. Current diabetic treatment includes diet and oral agent (monotherapy). Her weight is stable. She is following a low fat/cholesterol diet. Meal planning includes avoidance of concentrated sweets. She never participates in exercise. Review of Systems   Constitutional:  Negative for malaise/fatigue. HENT:  Negative for ear pain, postnasal drip and voice change. Eyes:  Negative for blurred vision, pain and redness. Respiratory:  Negative for shortness of breath and wheezing. Cardiovascular:  Negative for chest pain, palpitations, orthopnea and leg swelling.    Gastrointestinal:  Negative for constipation and diarrhea. Endocrine: Negative for polyuria. Genitourinary:  Negative for dysuria and frequency. Musculoskeletal:  Positive for arthralgias (knee pain bill L>R chronic) and back pain (chronic). Negative for neck pain. Skin:  Negative for pallor. Neurological:  Negative for dizziness and headaches. Psychiatric/Behavioral:  Negative for confusion. The patient is not nervous/anxious. Current Outpatient Medications   Medication Sig Dispense Refill    folic acid 1 MG Oral Tab Take 1 tablet (1 mg total) by mouth daily. 90 tablet 3    losartan-hydroCHLOROthiazide 100-25 MG Oral Tab Take 1 tablet by mouth once daily. 90 tablet 0    atorvastatin 40 MG Oral Tab Take 1 tablet (40 mg total) by mouth nightly. 90 tablet 0    METFORMIN HCL 1000 MG Oral Tab TAKE 1 TABLET(1000 MG) BY MOUTH TWICE DAILY BEFORE MEALS 180 tablet 1    cyclobenzaprine 10 MG Oral Tab Take 1 tablet (10 mg total) by mouth nightly as needed for Muscle spasms. 30 tablet 0    Ferrous Sulfate 324 MG Oral Tab EC Take orally  1 tab twice per day 180 tablet 1    fexofenadine (ALLEGRA ALLERGY) 180 MG Oral Tab Take 1 tablet (180 mg total) by mouth daily as needed. Take 1 tablet once daily for -2 -3  weeks and then as needed. 90 tablet 1    ondansetron (ZOFRAN) 4 mg tablet Take 1 tablet (4 mg total) by mouth every 8 (eight) hours as needed for Nausea. 20 tablet 0    Cyanocobalamin (VITAMIN B-12) 2500 MCG Sublingual SL Tab Take 1  Tab under the tongue daily 90 tablet 3    Albuterol Sulfate HFA (PROAIR HFA) 108 (90 Base) MCG/ACT Inhalation Aero Soln Inhale 2 puffs into the lungs every 4 (four) hours as needed for Wheezing. 1 Inhaler 0    loratadine 10 MG Oral Tab Take 1 tablet (10 mg total) by mouth daily. 30 tablet 3    Montelukast Sodium 10 MG Oral Tab Take 1 tablet (10 mg total) by mouth nightly.  30 tablet 3    GABAPENTIN 100 MG Oral Cap TAKE 1 CAPSULE(100 MG) BY MOUTH EVERY NIGHT 30 capsule 1    FLUTICASONE PROPIONATE 50 MCG/ACT Nasal Suspension SHAKE LIQUID AND USE 2 SPRAYS IN EACH NOSTRIL EVERY DAY FOR 1 TO 2 WEEKS THEN AS NEEDED 3 Bottle 0    Glucose Blood (TIFFANIE CONTOUR NEXT TEST) In Vitro Strip Test blood glucose three times a daily. 100 strip 11    Blood Glucose Monitoring Suppl (TIFFANIE CONTOUR NEXT MONITOR) w/Device Does not apply Kit 1 Application by Does not apply route 3 (three) times daily. 1 kit 0    aspirin 81 MG Oral Chew Tab Chew 1 tablet (81 mg total) by mouth daily. LANCETS ULTRA THIN 30G Does not apply Misc 30 g by Does not apply route 3 (three) times daily. 200 each 11     Allergies:  Doxycycline               Lisinopril              UNKNOWN   PHYSICAL EXAM:   Physical Exam  Vitals and nursing note reviewed. Constitutional:       General: She is not in acute distress. Appearance: She is well-developed. Comments: Obese    HENT:      Head: Normocephalic and atraumatic. Right Ear: Tympanic membrane, ear canal and external ear normal.      Left Ear: Hearing, tympanic membrane, ear canal and external ear normal.      Nose:      Right Sinus: No maxillary sinus tenderness or frontal sinus tenderness. Left Sinus: No maxillary sinus tenderness or frontal sinus tenderness. Eyes:      General: Lids are normal. No scleral icterus. Right eye: No discharge. Left eye: No discharge. Conjunctiva/sclera: Conjunctivae normal.      Right eye: Right conjunctiva is not injected. Left eye: Left conjunctiva is not injected. Pupils: Pupils are equal, round, and reactive to light. Neck:      Thyroid: No thyromegaly. Vascular: No JVD. Cardiovascular:      Rate and Rhythm: Normal rate and regular rhythm. Heart sounds: Normal heart sounds. No murmur heard. Pulmonary:      Effort: Pulmonary effort is normal. No respiratory distress. Breath sounds: Normal breath sounds. No wheezing or rales.    Abdominal:      General: Bowel sounds are normal.      Palpations: Abdomen is soft. There is no mass. Tenderness: There is no abdominal tenderness. Musculoskeletal:      Cervical back: Neck supple. Left knee: Decreased range of motion. Right lower leg: No edema. Left lower leg: No edema. Lymphadenopathy:      Cervical: No cervical adenopathy. Skin:     General: Skin is warm and dry. Neurological:      Mental Status: She is alert and oriented to person, place, and time. Psychiatric:         Behavior: Behavior normal.         Blood pressure 110/74, pulse 96, height 5' 5\" (1.651 m), weight 242 lb (109.8 kg), currently breastfeeding. ASSESSMENT/PLAN:     1.DM2   Keep sugars glycemic control to prevent complications of DM2  Keep 1800 ben ADA- Diabetic diet   Walking and activity as tolerated   accu-checks   Eye exam has apt in 1 week and foot exam yearly- completed   Take medications as prescribed  Add Jardiance 10 mg every day   Side effects and directions of medication discussed with patient. Patient verbalized understanding and compliance. Directions and side effects of medications discussed w/pt  Pt verbalized understanding and compliance       Hyperlipidemia   stable continue present management low-fat diet  Atorvastatin 40 mg daily labs      Left knee pain, unspecified chronicity  Lower back , r hip pain- DJD   pt  Seeing  Ortho  For  Moderate DJD knee   Rest  , weight  Reduction  F/u ortho  and physical th Dr   for conservative management for now  Side effects directions discussed with patient especially sedation patient aware-sedation    .  Essential hypertension  Take high blood pressure medication as perscribed   Low- sodium diet   Maintain walking - as tolerated   Track and record blood pressure and heart rate at home   The side effects of medication discussed with patient   Patient verbalizes understanding and compliance  Stable cpm , losartan hydrochlorothiazide 100/25 mg daily    Allergic rhinitis, unspecified seasonality, unspecified trigger  Stable cpm           Vitamin B 12 deficiency  Vit b12 2500 mg sl qd   Cpm stable     Decreased folic acid labs  Folic acid 1 mg daily  Cpm stable      Sickle cell trait   Anemia  Stable   Labs  disucssed with pt        Need colonoscopy 2020 -overdue  Hx colonoscopy-  - colon polyps x 2  - diverticulosis  - internal hemorrhoids  Referred - colonoscopy pt is overdue - she agrees now  to have colonoscopy completed      Recommendations:  - Post polypectomy instructions given  - Repeat colonoscopy in 3 years   - High fiber diet for diverticular disease  - Symptomatic treatment of hemorrhoids        No orders of the defined types were placed in this encounter. Meds This Visit:  Requested Prescriptions     Pending Prescriptions Disp Refills    losartan-hydroCHLOROthiazide 100-25 MG Oral Tab 90 tablet 1     Sig: Take 1 tablet by mouth once daily. atorvastatin 40 MG Oral Tab 90 tablet 1     Sig: Take 1 tablet (40 mg total) by mouth nightly. Ferrous Sulfate 324 MG Oral Tab  tablet 1     Sig: Take orally  1 tab twice per day    Cyanocobalamin (VITAMIN B-12) 2500 MCG Sublingual SL Tab 90 tablet 3     Sig: Take 1  Tab under the tongue daily    fexofenadine (ALLEGRA ALLERGY) 180 MG Oral Tab 90 tablet 1     Sig: Take 1 tablet (180 mg total) by mouth daily as needed. Take 1 tablet once daily for -2 -3  weeks and then as needed.        Imaging & Referrals:  [facility] - INTERNAL  TATO [place] 2D+3D SCREENING BILAT (CPT=[id number])       IK#[id number]

## 2023-10-03 DIAGNOSIS — E11.9 TYPE 2 DIABETES MELLITUS WITHOUT COMPLICATION, WITHOUT LONG-TERM CURRENT USE OF INSULIN (HCC): ICD-10-CM

## 2023-10-04 NOTE — TELEPHONE ENCOUNTER
Refill passed per [address] [facility] protocol. Requested Prescriptions   Pending Prescriptions Disp Refills    empagliflozin 10 MG Oral Tab 90 tablet 0     Sig: Take 1 tablet (10 mg total) by mouth daily.        Diabetes Medication Protocol Passed - 10/3/2023  3:33 PM        Passed - Last A1C < 7.5 and within past 6 months     Lab Results   Component Value Date    A1C 7.3 (H) 06/10/2023             Passed - In person appointment or virtual visit in the past 6 mos or appointment in next 3 mos     Recent Outpatient Visits              3 months ago Type 2 diabetes mellitus without complication, without long-term current use of insulin ([facility] [address] [address]    Hieu Stevens MD    Office Visit    11 months ago Polyp of colon, unspecified part of colon, unspecified type    [address], [facility], [place] Carmen Rascon MD    Office Visit    1 year ago Upper respiratory disease    [address],[facility], [facility], [place] Theda Pilot, Perri Ray    Telemedicine    2 years ago Chronic pain of both knees    [facility], [address], [place] Cherise Contreras DO    Office Visit    2 years ago Physical exam    Hieu Stevens MD    Office Visit                      Passed - EGFRCR or GFRAA > 50     GFR Evaluation  EGFRCR: 71 , resulted on 6/10/2023          Passed - GFR in the past 12 months           Recent Outpatient Visits              3 months ago Type 2 diabetes mellitus without complication, without long-term current use of insulin ([facility] [address]    Hieu Stevens MD    Office Visit    11 months ago Polyp of colon, unspecified part of colon, unspecified type    [address] [facility], [place] Annita Bhat, MD    Office Visit    1 year ago Upper respiratory disease    [facility], [address], [place] Sonna [place], [place]    Telemedicine    2 years ago Chronic pain of both knees    [address], [address] [facility],[address], Hieu Mtz DO    Office Visit    2 years ago Physical exam    [facility], [place] Yesi Elam MD    Office Visit

## 2023-10-04 NOTE — TELEPHONE ENCOUNTER
Refill passed per [place] [facility] protocol. Please review pended refill request as unable to refill due to high/very high interaction warning copied here:    High  Lactation Warning: Yeimi Robb recommended for Lactation  Details    Requested Prescriptions   Pending Prescriptions Disp Refills    empagliflozin 10 MG Oral Tab 90 tablet 0     Sig: Take 1 tablet (10 mg total) by mouth daily.        Diabetes Medication Protocol Passed - 10/3/2023  3:33 PM        Passed - Last A1C < 7.5 and within past 6 months     Lab Results   Component Value Date    A1C 7.3 (H) 06/10/2023             Passed - In person appointment or virtual visit in the past 6 mos or appointment in next 3 mos     Recent Outpatient Visits              3 months ago Type 2 diabetes mellitus without complication, without long-term current use of insulin ([address]    Hieu Camejo MD    Office Visit    11 months ago Polyp of colon, unspecified part of colon, unspecified type    [place], [address], [place] Valeri Hill MD    Office Visit    1 year ago Upper respiratory disease    [facility], [place] Catheryn Gun, [facility]    Telemedicine    2 years ago Chronic pain of both knees    [facility], [address], [place] Ronit Vieira DO    Office Visit    2 years ago Physical exam    Claude Peel, MD    Office Visit                      Passed - EGFRCR or GFRAA > 50     GFR Evaluation  EGFRCR: 71 , resulted on 6/10/2023          Passed - GFR in the past 12 months           Recent Outpatient Visits              3 months ago Type 2 diabetes mellitus without complication, without long-term current use of insulin ([address]    Hieu Camejo MD    Office Visit    11 months ago Polyp of colon, unspecified part of colon, unspecified type    [address], [place] Carmen Rascon MD    Office Visit    1 year ago Upper respiratory disease    [facility], [address], [place] Theda Pilot, [employer] Telemedicine    2 years ago Chronic pain of both knees    [address], [address], [place] Cherise Contreras DO    Office Visit    2 years ago Physical exam    [place], [place] Ras[place]    Office Visit

## 2024-01-03 DIAGNOSIS — E11.9 TYPE 2 DIABETES MELLITUS WITHOUT COMPLICATION, WITHOUT LONG-TERM CURRENT USE OF INSULIN (HCC): ICD-10-CM

## 2024-01-05 NOTE — TELEPHONE ENCOUNTER
Protocol Failed/ No Protocol    Requested Prescriptions   Pending Prescriptions Disp Refills    JARDIANCE 10 MG Oral Tab [Pharmacy Med Name: JARDIANCE 10MG TABLETS] 90 tablet 0     Sig: TAKE 1 TABLET(10 MG) BY MOUTH DAILY       Diabetes Medication Protocol Failed - 1/3/2024 11:50 AM        Failed - Last A1C < 7.5 and within past 6 months     Lab Results   Component Value Date    A1C 7.3 (H) 06/10/2023             Failed - In person appointment or virtual visit in the past 6 mos or appointment in next 3 mos     Recent Outpatient Visits              6 months ago Type 2 diabetes mellitus without complication, without long-term current use of insulin ([facility])    [facility]    Office Visit    1 year ago Polyp of colon, unspecified part of colon, unspecified type    [facility] [place] Vukomanovic, Emela, MD    Office Visit    2 years ago Upper respiratory disease    [facility] [place] Nguyen, Minhxuyen, PA-C    Telemedicine    2 years ago [facility] pain of both knees    [facility]    [facility]    2 years ago Physical exam    [facility] Chavez Hinkle MD    Office Visit                      Passed - EGFRCR or GFRAA > 50     GFR Evaluation  EGFRCR: 71 , resulted on 6/10/2023          Passed - GFR in the past 12 months               Recent Outpatient Visits              6 months ago Type 2 diabetes mellitus without complication, without long-term current use of insulin ([facility])    [facility]    Office Visit    1 year ago Polyp of colon, unspecified part of colon, unspecified type    [facility] [place] Vukomanovic, Emela, MD    Office Visit    2 years ago Upper respiratory disease     [facility]    Telemedicine    2 years ago Chronic pain of both knees    [facility] [place] Tony Gonzalez DO    Office Visit    2 years ago [facility] exam    [facility]    Office Visit

## 2024-02-03 DIAGNOSIS — E78.00 PURE HYPERCHOLESTEROLEMIA: ICD-10-CM

## 2024-02-06 RX ORDER — ATORVASTATIN CALCIUM 40 MG/1
40 TABLET, FILM COATED ORAL NIGHTLY
Qty: 90 TABLET | Refills: 1 | Status: SHIPPED | OUTPATIENT
Start: 2024-02-06

## 2024-02-06 NOTE — TELEPHONE ENCOUNTER
LAST VISIT 7/6/23;  Instructions    Return in about 4 weeks (around 8/3/2023), or if symptoms worsen or fail to improve, for Blood pressure check, Diabetic Follow Up, Follow up visit    No future appointments.    [employer] message sent with scheduling information and do the overdue fasting labs.     CSS=please assists .         Please review; protocol failed/no protocol.     Requested Prescriptions   Pending Prescriptions Disp Refills    ATORVASTATIN 40 MG Oral Tab [Pharmacy Med Name: ATORVASTATIN 40MG TABLETS] 90 tablet 1     Sig: TAKE 1 TABLET(40 MG) BY MOUTH EVERY NIGHT       Cholesterol Medication Protocol Failed - 2/3/2024 12:34 PM        Failed - Last LDL < 130     Lab Results   Component Value Date     (H) 06/10/2023             Passed - ALT in past 12 months        Passed - LDL in past 12 months        Passed - Last ALT < 80     Lab Results   Component Value Date    ALT 41 06/10/2023             Passed - In person appointment or virtual visit in the past 12 mos or appointment in next 3 mos     Recent Outpatient Visits              7 months ago Type 2 diabetes mellitus without complication, without long-term current use of insulin (HCC)    [facility] Chavez Hinkle MD    Office Visit    1 year ago Polyp of colon, unspecified part of colon, unspecified type    [facility] [place] Vukomanovic, Emela, MD    Office Visit    2 years ago Upper respiratory disease    [facility] [place] Nguyen, Minhxuyen, PA-C    Telemedicine    2 years ago [facility] pain of both knees    [facility]    Office Visit    2 years ago Physical exam    [facility] Chavez Hinkle MD    Office Visit                            Recent Outpatient Visits              7 months ago Type 2 diabetes mellitus without  complication, without long-term current use of insulin (HCC)    [facility], Chavez Hinkle MD    Office Visit    1 year ago Polyp of colon, unspecified part of colon, unspecified type    [facility] [place] Vukomanovic, Emela, MD    Office Visit    2 years ago [facility] respiratory disease    [facility]    Telemedicine    2 years ago Chronic pain of both knees    [facility] Tony Morales DO    Office Visit    2 years ago Physical exam    [facility], Chavez Hinkle MD    Office Visit

## 2024-02-07 DIAGNOSIS — I10 ESSENTIAL HYPERTENSION: ICD-10-CM

## 2024-02-09 RX ORDER — LOSARTAN POTASSIUM AND HYDROCHLOROTHIAZIDE 25; 100 MG/1; MG/1
1 TABLET ORAL DAILY
Qty: 90 TABLET | Refills: 3 | Status: SHIPPED | OUTPATIENT
Start: 2024-02-09

## 2024-02-09 NOTE — TELEPHONE ENCOUNTER
With POP UP HIGH WARNING due to lactation  , unable to approved per protocol,sent to PCP .         High  Lactation Warning: losartan-hydroCHLOROthiazideNot recommended for Lactation  D        LAST VISIT 7/6/23;    Instructions      Return in about 4 weeks (around 8/3/2023), or if symptoms worsen or fail to improve, for Blood pressure check, Diabetic Follow Up, Follow up visit.      No future appointments.  [employer] message sent .   CSS=please assists .     Refill passed per [facility] protocol.     Requested Prescriptions   Pending Prescriptions Disp Refills    LOSARTAN-HYDROCHLOROTHIAZIDE 100-25 MG Oral Tab [Pharmacy Med Name: LOSARTAN/HCTZ 100/25MG TABLETS] 90 tablet 1     Sig: TAKE 1 TABLET BY MOUTH EVERY DAY       Hypertension Medications Protocol Passed - 2/7/2024 11:44 AM        Passed - CMP or BMP in past 12 months        Passed - Last BP reading less than 140/90     BP Readings from Last 1 Encounters:   07/06/23 110/74               Passed - In person appointment or virtual visit in the past 12 mos or appointment in next 3 mos     Recent Outpatient Visits              7 months ago Type 2 diabetes mellitus without complication, without long-term current use of insulin (HCC)    [facility]    [facility]    1 year ago Polyp of colon, unspecified part of colon, unspecified type    [facility]    Office Visit    2 years ago Upper respiratory disease    [facility]    Telemedicine    2 years ago Chronic pain of both knees    [facility]    Office Visit    2 years ago Physical exam    [facility]    Office Visit                      Passed - EGFRCR or GFRAA > 50     GFR Evaluation  EGFRCR: 71  , resulted on 6/10/2023                     Recent Outpatient Visits              7 months ago Type 2 diabetes mellitus without complication, without long-term current use of insulin (HCC)    [facility], Chavez Hinkle MD    Office Visit    1 year ago Polyp of colon, unspecified part of colon, unspecified type    [facility], [address], [place] Chavez Youssef MD    Office Visit    2 years ago Upper respiratory disease    [facility]    Telemedicine    2 years ago Chronic pain of both knees    [facility] Tony Morales DO    Office Visit    2 years ago Physical exam    [facility]    Office Visit

## 2024-02-09 NOTE — TELEPHONE ENCOUNTER
Chavez Youssef MD   to Em Rn Triage         2/9/24 12:56 PM  Pt  due for diabetic  visit please schedule    [employer] message sent to patient to schedule an office visit with PCP.   Please make a phone attempt.

## 2024-03-20 NOTE — TELEPHONE ENCOUNTER
Please review.  Protocol failed / Has no protocol.     [employer] message sent to patient to complete follow up labs pended from LOV 7/6/2023.    Requested Prescriptions   Pending Prescriptions Disp Refills    metFORMIN HCl 1000 MG Oral Tab 180 tablet 1     Sig: Take 1 tablet (1,000 mg total) by mouth 2 (two) times daily before meals.       Diabetes Medication Protocol Failed - 3/18/2024  7:14 PM        Failed - Last A1C < 7.5 and within past 6 months     Lab Results   Component Value Date    A1C 7.3 (H) 06/10/2023             Failed - In person appointment or virtual visit in the past 6 mos or appointment in next 3 mos     Recent Outpatient Visits              8 months ago Type 2 diabetes mellitus without complication, without long-term current use of insulin (HCC)    [facility], Chavez Hinkle MD    Office Visit    1 year ago Polyp of colon, unspecified part of colon, unspecified type    [facility]    Office Visit    2 years ago Upper respiratory disease    [facility] [place] Nguyen, Minhxuyen, PA-C    Telemedicine    2 years ago Chronic pain of both knees    [facility]    Office Visit    2 years ago Physical exam    [facility] Chavez Hinkle MD    Office Visit                      Passed - Microalbumin procedure in past 12 months or taking ACE/ARB        Passed - EGFRCR or GFRAA > 50     GFR Evaluation  EGFRCR: 71 , resulted on 6/10/2023          Passed - GFR in the past 12 months

## 2024-03-26 NOTE — TELEPHONE ENCOUNTER
Action Requested: Summary for Provider     []  Critical Lab, Recommendations Needed  [x] Need Additional Advice  []   FYI    []   Need Orders  [] Need Medications Sent to Pharmacy  []  Other     SUMMARY: Patient called asking for  to prescrib No

## 2024-03-26 NOTE — TELEPHONE ENCOUNTER
Last read by Roxane Jeronimo at 12:53 PM on 3/24/2024.   Patient saw [employer] message sent--->see above

## 2024-04-02 DIAGNOSIS — E11.9 TYPE 2 DIABETES MELLITUS WITHOUT COMPLICATION, WITHOUT LONG-TERM CURRENT USE OF INSULIN (HCC): ICD-10-CM

## 2024-04-04 NOTE — TELEPHONE ENCOUNTER
Please review.Protocol failed/ No protocol      Requested Prescriptions   Pending Prescriptions Disp Refills    JARDIANCE 10 MG Oral Tab [Pharmacy Med Name: JARDIANCE 10MG TABLETS] 90 tablet 0     Sig: TAKE 1 TABLET(10 MG) BY MOUTH DAILY       Diabetes Medication Protocol Failed - 4/2/2024 11:55 AM        Failed - Last A1C < 7.5 and within past 6 months     Lab Results   Component Value Date    A1C 7.3 (H) 06/10/2023             Failed - In person appointment or virtual visit in the past 6 mos or appointment in next 3 mos     Recent Outpatient Visits              9 months ago Type 2 diabetes mellitus without complication, without long-term current use of insulin (HCC)    [facility], Chavez Hinkle MD    Office Visit    1 year ago Polyp of colon, unspecified part of colon, unspecified type    [facility] [place] Vukomanovic, Emela, MD    Office Visit    2 years ago Upper respiratory disease    [facility] [place] Nguyen, Minhxuyen, PA-C    Telemedicine    2 years ago Chronic pain of both knees    [facility] Tony Morales DO    Office Visit    2 years ago Physical exam    [facility], [place] Del[place]    Office Visit          Future Appointments         Provider Department Appt Notes    In 1 month [address]                Passed - Microalbumin procedure in past 12 months or taking ACE/ARB        Passed - EGFRCR or GFRAA > 50     GFR Evaluation  EGFRCR: 71 , resulted on 6/10/2023          Passed - GFR in the past 12 months             Future Appointments         Provider Department Appt Notes    In 1 month [address]              Recent Outpatient Visits              9 months ago Type 2 diabetes mellitus without  complication, without long-term current use of insulin (HCC)    [facility], Chavez Hinkle MD    Office Visit    1 year ago Polyp of colon, unspecified part of colon, unspecified type    [facility] [place] Vukomanovic, Emela, MD    Office Visit    2 years ago [facility] respiratory disease    [facility]    Telemedicine    2 years ago Chronic pain of both knees    [facility] Tony Morales DO    Office Visit    2 years ago Physical exam    [facility], Chavez Hinkle MD    Office Visit

## 2024-04-06 ENCOUNTER — LAB ENCOUNTER (OUTPATIENT)
Dept: LAB | Facility: HOSPITAL | Age: 59
End: 2024-04-06
Attending: INTERNAL MEDICINE
Payer: MEDICAID

## 2024-04-06 LAB
ALBUMIN SERPL-MCNC: 4.6 G/DL (ref 3.2–4.8)
ALBUMIN/GLOB SERPL: 1.1 {RATIO} (ref 1–2)
ALP LIVER SERPL-CCNC: 85 U/L
ALT SERPL-CCNC: 53 U/L
ANION GAP SERPL CALC-SCNC: 9 MMOL/L (ref 0–18)
AST SERPL-CCNC: 44 U/L (ref ?–34)
BILIRUB SERPL-MCNC: 0.7 MG/DL (ref 0.3–1.2)
BUN BLD-MCNC: 14 MG/DL (ref 9–23)
BUN/CREAT SERPL: 13.1 (ref 10–20)
CALCIUM BLD-MCNC: 9.9 MG/DL (ref 8.7–10.4)
CHLORIDE SERPL-SCNC: 102 MMOL/L (ref 98–112)
CO2 SERPL-SCNC: 26 MMOL/L (ref 21–32)
CREAT BLD-MCNC: 1.07 MG/DL
EGFRCR SERPLBLD CKD-EPI 2021: 60 ML/MIN/1.73M2 (ref 60–?)
EST. AVERAGE GLUCOSE BLD GHB EST-MCNC: 186 MG/DL (ref 68–126)
FASTING STATUS PATIENT QL REPORTED: YES
GLOBULIN PLAS-MCNC: 4.3 G/DL (ref 2.8–4.4)
GLUCOSE BLD-MCNC: 149 MG/DL (ref 70–99)
HBA1C MFR BLD: 8.1 % (ref ?–5.7)
OSMOLALITY SERPL CALC.SUM OF ELEC: 287 MOSM/KG (ref 275–295)
POTASSIUM SERPL-SCNC: 3.8 MMOL/L (ref 3.5–5.1)
PROT SERPL-MCNC: 8.9 G/DL (ref 5.7–8.2)
SODIUM SERPL-SCNC: 137 MMOL/L (ref 136–145)

## 2024-04-06 PROCEDURE — 80053 COMPREHEN METABOLIC PANEL: CPT | Performed by: INTERNAL MEDICINE

## 2024-04-06 PROCEDURE — 36415 COLL VENOUS BLD VENIPUNCTURE: CPT | Performed by: INTERNAL MEDICINE

## 2024-04-06 PROCEDURE — 83036 HEMOGLOBIN GLYCOSYLATED A1C: CPT | Performed by: INTERNAL MEDICINE

## 2024-06-06 ENCOUNTER — PATIENT MESSAGE (OUTPATIENT)
Dept: INTERNAL MEDICINE CLINIC | Facility: CLINIC | Age: 59
End: 2024-06-06

## 2024-06-20 DIAGNOSIS — D64.9 ANEMIA, UNSPECIFIED TYPE: ICD-10-CM

## 2024-06-24 RX ORDER — FOLIC ACID 1 MG/1
1 TABLET ORAL DAILY
Qty: 90 TABLET | Refills: 1 | Status: SHIPPED | OUTPATIENT
Start: 2024-06-24

## 2024-06-24 NOTE — TELEPHONE ENCOUNTER
Please review. Protocol Failed; No Protocol    Requested Prescriptions   Pending Prescriptions Disp Refills    folic acid 1 MG Oral Tab 90 tablet 3     Sig: Take 1 tablet (1 mg total) by mouth daily.       There is no refill protocol information for this order              Recent Outpatient Visits              11 months ago Type 2 diabetes mellitus without complication, without long-term current use of insulin (HCC)    [facility], Chavez Hinkle MD    Office Visit    1 year ago Polyp of colon, unspecified part of colon, unspecified type    [facility] [place] Vukomanovic, Emela, MD    Office Visit    2 years ago Upper respiratory disease    [facility] [place] Nguyen, Minhxuyen, PA-C    Telemedicine    2 years ago Chronic pain of both knees    [facility]    [facility]    2 years ago Physical exam    [facility] Chavez Hinkle MD    Office Visit

## 2024-08-03 DIAGNOSIS — E78.00 PURE HYPERCHOLESTEROLEMIA: ICD-10-CM

## 2024-08-07 NOTE — TELEPHONE ENCOUNTER
Please review; protocol failed    MyChart message sent to patient to schedule an office visit with PCP.   Routed to Call Center to call patient and make an appointment.    Message sent to patient about lab work needing to be done from 4/12/2024    No future appointments.  LAST OFFICE VISIT: 7/16/2023    Requested Prescriptions   Pending Prescriptions Disp Refills    atorvastatin 40 MG Oral Tab [Pharmacy Med Name: ATORVASTATIN 40MG TABLETS] 90 tablet 0     Sig: TAKE 1 TABLET(40 MG) BY MOUTH EVERY NIGHT       Cholesterol Medication Protocol Failed - 8/3/2024 12:25 PM        Failed - Lipid panel within past 12 months     Lab Results   Component Value Date    CHOLEST 209 (H) 06/10/2023    TRIG 106 06/10/2023    HDL 54 06/10/2023     (H) 06/10/2023    VLDL 19 06/10/2023    NONHDLC 155 (H) 06/10/2023             Failed - In person appointment or virtual visit in the past 12 mos or appointment in next 3 mos     Recent Outpatient Visits              1 year ago Type 2 diabetes mellitus without complication, without long-term current use of insulin (HCC)    [facility] Chavez Hinkle MD    Office Visit    1 year ago Polyp of colon, unspecified part of colon, unspecified type    [facility] [place] Vukomanovic, Emela, MD    Office Visit    2 years ago [facility] respiratory disease    [facility]    Telemedicine    2 years ago Chronic pain of both knees    [facility]    Office Visit    3 years ago Physical exam    [facility] Chavez Hinkle MD    Office Visit                      Passed - ALT < 80     Lab Results   Component Value Date    ALT 53 (H) 04/06/2024             Passed - ALT resulted within past year               Recent Outpatient Visits              1 year ago  Type 2 diabetes mellitus without complication, without long-term current use of insulin (HCC)    [facility], Chavez Hinkle MD    Office Visit    1 year ago Polyp of colon, unspecified part of colon, unspecified type    [facility] [address], [place] Chavez Youssef MD    Office Visit    2 years ago Upper respiratory disease    [facility] [place] Nguyen, Minhxuyen, PA-C    Telemedicine    2 years ago Chronic pain of both knees    [facility] Tony Morales DO    Office Visit    3 years ago Physical exam    [facility] [place] [facility]    Office Visit

## 2024-08-07 NOTE — TELEPHONE ENCOUNTER
Please call patient to assist in making an appointment.Thank you      Last office visit: 7/6/2023     Patient alsop has lab to complete from 4/12/24    A [employer] message has been sent to patient

## 2024-08-09 RX ORDER — ATORVASTATIN CALCIUM 40 MG/1
40 TABLET, FILM COATED ORAL NIGHTLY
Qty: 90 TABLET | Refills: 0 | Status: SHIPPED | OUTPATIENT
Start: 2024-08-09

## 2024-09-21 ENCOUNTER — TELEPHONE (OUTPATIENT)
Dept: INTERNAL MEDICINE CLINIC | Facility: CLINIC | Age: 59
End: 2024-09-21

## 2024-09-22 DIAGNOSIS — D64.9 ANEMIA, UNSPECIFIED TYPE: ICD-10-CM

## 2024-09-26 NOTE — TELEPHONE ENCOUNTER
Please Review. Protocol Failed; No Protocol   Mycharted patient to schedule an appointment.     Requested Prescriptions   Pending Prescriptions Disp Refills    METFORMIN HCL 1000 MG Oral Tab [Pharmacy Med Name: METFORMIN 1000MG TABLETS] 180 tablet 1     Sig: TAKE 1 TABLET BY MOUTH DAILY WITH BREAKFAST AND 1 TABLET EVERY EVENING       Diabetes Medication Protocol Failed - 9/21/2024  1:36 PM        Failed - Last A1C < 7.5 and within past 6 months     Lab Results   Component Value Date    A1C 8.1 (H) 04/06/2024             Failed - In person appointment or virtual visit in the past 6 mos or appointment in next 3 mos     Recent Outpatient Visits              1 year ago Type 2 diabetes mellitus without complication, without long-term current use of insulin ([facility])    [facility]    [facility] Visit    1 year ago Polyp of colon, unspecified part of colon, unspecified type    [facility] [place] Vukomanovic, Emela, MD    Office Visit    2 years ago Upper respiratory disease    [facility] [place] Nguyen, Minhxuyen, PA-C    Telemedicine    3 years ago Chronic pain of both knees    [facility]    Office Visit    3 years ago Physical exam    [facility]    [facility]                      Failed - Microalbumin procedure in past 12 months or taking ACE/ARB        Passed - EGFRCR or GFRAA > 50     GFR Evaluation  EGFRCR: 60 , resulted on 4/6/2024          Passed - GFR in the past 12 months                 Recent Outpatient Visits              1 year ago Type 2 diabetes mellitus without complication, without long-term current use of insulin ([facility])    [facility]    Office Visit    1 year ago Polyp of colon,  unspecified part of colon, unspecified type    [facility]    Office Visit    2 years ago [facility] respiratory disease    [facility]    Telemedicine    3 years ago Chronic pain of both knees    [facility] [place] [place]    Office Visit    3 years ago [facility] exam    [facility]    Office Visit

## 2024-09-27 NOTE — TELEPHONE ENCOUNTER
Please review. Protocol Failed; No Protocol    Requested Prescriptions   Pending Prescriptions Disp Refills    FOLIC ACID 1 MG Oral Tab [Pharmacy Med Name: FOLIC ACID 1MG TABLETS] 90 tablet 1     Sig: TAKE 1 TABLET(1 MG) BY MOUTH DAILY       There is no refill protocol information for this order              Recent Outpatient Visits              1 year ago Type 2 diabetes mellitus without complication, without long-term current use of insulin (HCC)    [facility], Chavez Hinkle MD    Office Visit    1 year ago Polyp of colon, unspecified part of colon, unspecified type    [facility]    Office Visit    2 years ago Upper respiratory disease    [facility] [place] Nguyen, Minhxuyen, PA-C    Telemedicine    3 years ago Chronic pain of both knees    [facility] Tony Morales DO    Office Visit    3 years ago Physical exam    [facility] Chavez Hinkle MD    Office Visit

## 2024-09-28 RX ORDER — FOLIC ACID 1 MG/1
1 TABLET ORAL DAILY
Qty: 90 TABLET | Refills: 1 | Status: SHIPPED | OUTPATIENT
Start: 2024-09-28

## 2024-10-24 NOTE — TELEPHONE ENCOUNTER
Please Review. Protocol Failed; No Protocol   Mycharted patient to schedule an appointment.     Lab Results   Component Value Date     A1C 8.1 (H) 04/06/2024     Requested Prescriptions   Pending Prescriptions Disp Refills    METFORMIN HCL 1000 MG Oral Tab [Pharmacy Med Name: METFORMIN 1000MG TABLETS] 60 tablet 0     Sig: TAKE 1 TABLET BY MOUTH DAILY WITH BREAKFAST AND 1 TABLET EVERY EVENING       Diabetes Medication Protocol Failed - 10/24/2024  1:27 PM        Failed - Last A1C < 7.5 and within past 6 months     Lab Results   Component Value Date    A1C 8.1 (H) 04/06/2024             Failed - In person appointment or virtual visit in the past 6 mos or appointment in next 3 mos     Recent Outpatient Visits              1 year ago Type 2 diabetes mellitus without complication, without long-term current use of insulin ([facility])    [facility]    Office Visit    1 year ago Polyp of colon, unspecified part of colon, unspecified type    [facility]    Office Visit    2 years ago Upper respiratory disease    [facility] [place] Nguyen, Minhxuyen, PA-C    Telemedicine    3 years ago Chronic pain of both knees    [facility] Tony Gonazlez DO    Office Visit    3 years ago Physical exam    [facility]    Office Visit                      Failed - Microalbumin procedure in past 12 months or taking ACE/ARB        Passed - EGFRCR or GFRAA > 50     GFR Evaluation  EGFRCR: 60 , resulted on 4/6/2024          Passed - GFR in the past 12 months                 Recent Outpatient Visits              1 year ago Type 2 diabetes mellitus without complication, without long-term current use of insulin ([facility])    [facility]  Chavez Youssef MD    Office Visit    1 year ago Polyp of colon, unspecified part of colon, unspecified type    [facility] [address], [place] Chavez Youssef MD    Office Visit    2 years ago Upper respiratory disease    [facility]    Telemedicine    3 years ago Chronic pain of both knees    [facility] Tony Gonzalez DO    Office Visit    3 years ago Physical exam    [facility] Chavez Youssef MD    Office Visit

## 2024-11-02 ENCOUNTER — LAB ENCOUNTER (OUTPATIENT)
Dept: LAB | Facility: HOSPITAL | Age: 59
End: 2024-11-02
Attending: INTERNAL MEDICINE
Payer: MEDICAID

## 2024-11-02 PROCEDURE — 80053 COMPREHEN METABOLIC PANEL: CPT | Performed by: INTERNAL MEDICINE

## 2024-11-02 PROCEDURE — 82043 UR ALBUMIN QUANTITATIVE: CPT | Performed by: INTERNAL MEDICINE

## 2024-11-02 PROCEDURE — 82570 ASSAY OF URINE CREATININE: CPT | Performed by: INTERNAL MEDICINE

## 2024-11-02 PROCEDURE — 36415 COLL VENOUS BLD VENIPUNCTURE: CPT | Performed by: INTERNAL MEDICINE

## 2024-11-02 PROCEDURE — 85025 COMPLETE CBC W/AUTO DIFF WBC: CPT | Performed by: INTERNAL MEDICINE

## 2024-11-02 PROCEDURE — 84443 ASSAY THYROID STIM HORMONE: CPT | Performed by: INTERNAL MEDICINE

## 2024-11-02 PROCEDURE — 80061 LIPID PANEL: CPT | Performed by: INTERNAL MEDICINE

## 2024-11-03 DIAGNOSIS — E78.00 PURE HYPERCHOLESTEROLEMIA: ICD-10-CM

## 2024-11-07 RX ORDER — ATORVASTATIN CALCIUM 40 MG/1
40 TABLET, FILM COATED ORAL EVERY EVENING
Qty: 30 TABLET | Refills: 0 | Status: SHIPPED | OUTPATIENT
Start: 2024-11-07

## 2024-11-07 NOTE — TELEPHONE ENCOUNTER
Please review. Protocol Failed; No Protocol    No future appointments.    [employer] message sent to patient to schedule an office visit with Provider and/or  Routed to Patient  for assistance with appointment.     Requested Prescriptions   Pending Prescriptions Disp Refills    ATORVASTATIN 40 MG Oral Tab [Pharmacy Med Name: ATORVASTATIN 40MG TABLETS] 90 tablet 0     Sig: TAKE 1 TABLET BY MOUTH EVERY EVENING. NEED APPOINTMENT FOR FURTHER REFILLS       Cholesterol Medication Protocol Failed - 11/7/2024 10:15 AM        Failed - In person appointment or virtual visit in the past 12 mos or appointment in next 3 mos     Recent Outpatient Visits              1 year ago Type 2 diabetes mellitus without complication, without long-term current use of insulin (HCC)    [facility], Chavez Hinkle MD    Office Visit    2 years ago Polyp of colon, unspecified part of colon, unspecified type    [facility]    Office Visit    2 years ago Upper respiratory disease    [facility] [place] Nguyen, Minhxuyen, PA-C    Telemedicine    3 years ago Chronic pain of both knees    [facility]    Office Visit    3 years ago Physical exam    [facility]    Office Visit                      Passed - ALT < 80     Lab Results   Component Value Date    ALT 25 11/02/2024             Passed - ALT resulted within past year        Passed - Lipid panel within past 12 months     Lab Results   Component Value Date    CHOLEST 213 (H) 11/02/2024    TRIG 98 11/02/2024    HDL 49 11/02/2024     (H) 11/02/2024    VLDL 18 11/02/2024    NONHDLC 164 (H) 11/02/2024               METFORMIN HCL 1000 MG Oral Tab [Pharmacy Med Name: METFORMIN 1000MG TABLETS] 180 tablet 0      Sig: TAKE 1 TABLET BY MOUTH DAILY WITH BREAKFAST AND 1 TABLET EVERY EVENING       Diabetes Medication Protocol Failed - 11/7/2024 10:15 AM        Failed - Last A1C < 7.5 and within past 6 months     Lab Results   Component Value Date    A1C 8.1 (H) 04/06/2024             Failed - In person appointment or virtual visit in the past 6 mos or appointment in next 3 mos     Recent Outpatient Visits              1 year ago Type 2 diabetes mellitus without complication, without long-term current use of insulin ([facility]    [facility]    [facility] Visit    2 years ago Polyp of colon, unspecified part of colon, unspecified type    [facility] [place] Vukomanovic, Emela, MD    Office Visit    2 years ago Upper respiratory disease    [facility] [place] Nguyen, Minhxuyen, PA-C    Telemedicine    3 years ago Chronic pain of both knees    [facility]    Office Visit    3 years ago Physical exam    [facility] Chavez Hinkle MD    Office Visit                      Passed - Microalbumin procedure in past 12 months or taking ACE/ARB        Passed - EGFRCR or GFRAA > 50     GFR Evaluation  EGFRCR: 69 , resulted on 11/2/2024          Passed - GFR in the past 12 months                 Recent Outpatient Visits              1 year ago Type 2 diabetes mellitus without complication, without long-term current use of insulin ([facility])    [facility]    Office Visit    2 years ago Polyp of colon, unspecified part of colon, unspecified type    [facility] [place] Vukomanovic, Emela, MD    Office Visit    2 years ago Upper respiratory disease    [facility] [place] Nguyen, Minhxuyen,  SUGAR    Telemedicine    3 years ago Chronic pain of both knees    [facility], Tony Morales DO    Office Visit    3 years ago Physical exam    [facility], Chavez Hinkle MD    Office Visit

## 2024-11-08 DIAGNOSIS — E78.00 PURE HYPERCHOLESTEROLEMIA: ICD-10-CM

## 2024-11-12 RX ORDER — ATORVASTATIN CALCIUM 40 MG/1
40 TABLET, FILM COATED ORAL EVERY EVENING
Qty: 90 TABLET | Refills: 0 | OUTPATIENT
Start: 2024-11-12

## 2024-12-04 DIAGNOSIS — E78.00 PURE HYPERCHOLESTEROLEMIA: ICD-10-CM

## 2024-12-09 NOTE — TELEPHONE ENCOUNTER
Please review. Protocol Failed; No Protocol    No future appointments.    [employer] message sent to patient to schedule an office visit with Provider and/or  Routed to Patient  for assistance with appointment.     Message sent to patient to complete labs     Requested Prescriptions   Pending Prescriptions Disp Refills    ATORVASTATIN 40 MG Oral Tab [Pharmacy Med Name: ATORVASTATIN 40MG TABLETS] 30 tablet 0     Sig: TAKE 1 TABLET(40 MG) BY MOUTH EVERY EVENING       Cholesterol Medication Protocol Failed - 12/9/2024  1:58 PM        Failed - In person appointment or virtual visit in the past 12 mos or appointment in next 3 mos     Recent Outpatient Visits              1 year ago Type 2 diabetes mellitus without complication, without long-term current use of insulin (HCC)    [facility], Chavez Hinkle MD    Office Visit    2 years ago Polyp of colon, unspecified part of colon, unspecified type    [facility]    Office Visit    3 years ago Upper respiratory disease    [facility] [place] Nguyen, Minhxuyen, PA-C    Telemedicine    3 years ago [facility] pain of both knees    [facility]    Office Visit    3 years ago Physical exam    [facility] Ch[facility] H[facility]    Office Visit                      Passed - ALT < 80     Lab Results   Component Value Date    ALT 25 11/02/2024             Passed - ALT resulted within past year        Passed - Lipid panel within past 12 months     Lab Results   Component Value Date    CHOLEST 213 (H) 11/02/2024    TRIG 98 11/02/2024    HDL 49 11/02/2024     (H) 11/02/2024    VLDL 18 11/02/2024    NONHDLC 164 (H) 11/02/2024               METFORMIN HCL 1000 MG Oral Tab [Pharmacy Med Name: METFORMIN 1000MG TABLETS]  60 tablet 0     Sig: TAKE 1 TABLET BY MOUTH TWICE DAILY( BREAKFAST AND EVERY EVENING)       Diabetes Medication Protocol Failed - 12/9/2024  1:58 PM        Failed - Last A1C < 7.5 and within past 6 months     Lab Results   Component Value Date    A1C 8.1 (H) 04/06/2024             Failed - In person appointment or virtual visit in the past 6 mos or appointment in next 3 mos     Recent Outpatient Visits              1 year ago Type 2 diabetes mellitus without complication, without long-term current use of insulin ([facility])    [facility]    [facility] Visit    2 years ago Polyp of colon, unspecified part of colon, unspecified type    [facility] [place] Vukomanovic, Emela, MD    Office Visit    3 years ago Upper respiratory disease    [facility] [place] [facility] [facility]    Telemedicine    3 years ago Chronic pain of both knees    [facility]    Office Visit    3 years ago Physical exam    [facility] Chavez Hinkle MD    Office Visit                      Passed - Microalbumin procedure in past 12 months or taking ACE/ARB        Passed - EGFRCR or GFRAA > 50     GFR Evaluation  EGFRCR: 69 , resulted on 11/2/2024          Passed - GFR in the past 12 months                 Recent Outpatient Visits              1 year ago Type 2 diabetes mellitus without complication, without long-term current use of insulin ([facility])    [facility]    Office Visit    2 years ago Polyp of colon, unspecified part of colon, unspecified type    [facility] [place] Vukomanovic, Emela, MD    Office Visit    3 years ago Upper respiratory disease    [facility] [place] Nguyen,  SUGAR Loza    Telemedicine    3 years ago Chronic pain of both knees    [facility]    Office [facility]    3 years ago Physical exam    [facility], Chavez Hinkle MD    Office Visit

## 2024-12-12 DIAGNOSIS — E78.00 PURE HYPERCHOLESTEROLEMIA: ICD-10-CM

## 2024-12-12 RX ORDER — ATORVASTATIN CALCIUM 40 MG/1
40 TABLET, FILM COATED ORAL EVERY EVENING
Qty: 30 TABLET | Refills: 0 | Status: SHIPPED | OUTPATIENT
Start: 2024-12-12

## 2024-12-13 ENCOUNTER — TELEPHONE (OUTPATIENT)
Dept: INTERNAL MEDICINE CLINIC | Facility: CLINIC | Age: 59
End: 2024-12-13

## 2024-12-13 RX ORDER — ATORVASTATIN CALCIUM 40 MG/1
40 TABLET, FILM COATED ORAL EVERY EVENING
Qty: 90 TABLET | Refills: 0 | OUTPATIENT
Start: 2024-12-13

## 2024-12-13 NOTE — TELEPHONE ENCOUNTER
Patient is requesting 90 day refill       Current Outpatient Medications:       metFORMIN HCl 1000 MG Oral Tab, TAKE 1 TABLET BY MOUTH TWICE DAILY( BREAKFAST AND EVERY EVENING), Disp: 60 tablet, Rfl: 0

## 2024-12-18 ENCOUNTER — TELEPHONE (OUTPATIENT)
Dept: INTERNAL MEDICINE CLINIC | Facility: CLINIC | Age: 59
End: 2024-12-18

## 2024-12-18 DIAGNOSIS — Z12.31 SCREENING MAMMOGRAM, ENCOUNTER FOR: Primary | ICD-10-CM

## 2024-12-19 NOTE — TELEPHONE ENCOUNTER
[employer] message sent to pt.  Provided central scheduling number to make appt for her mammogram.

## 2025-01-17 NOTE — TELEPHONE ENCOUNTER
Please Review. Protocol Failed; No Protocol       A1C 8.1 (H) 04/06/2024     Requested Prescriptions   Pending Prescriptions Disp Refills    METFORMIN HCL 1000 MG Oral Tab [Pharmacy Med Name: METFORMIN 1000MG TABLETS] 60 tablet 0     Sig: TAKE 1 TABLET BY MOUTH TWICE DAILY( BREAKFAST AND EVERY EVENING)       Diabetes Medication Protocol Failed - 1/17/2025  4:09 PM        Failed - Last A1C < 7.5 and within past 6 months     Lab Results   Component Value Date    A1C 8.1 (H) 04/06/2024             Passed - In person appointment or virtual visit in the past 6 mos or appointment in next 3 mos     Recent Outpatient Visits              1 year ago Type 2 diabetes mellitus without complication, without long-term current use of insulin (HCC)    [facility] Chavez Youssef MD    Office Visit    2 years ago Polyp of colon, unspecified part of colon, unspecified type    [facility] [address], [place] Chavez Youssef MD    Office Visit    3 years ago Upper respiratory disease    [facility] [address], [place] Denia Haro PA-C    Telemedicine    3 years ago Chronic pain of both knees    [facility] Tony Gonzalez DO    Office Visit    3 years ago Physical exam    [facility]    [facility]          Future Appointments         Provider Department Appt Notes    In 2 weeks Chavez Youssef MD [facility] [address], [place] *Care Gaps*    In 2 weeks [facility]                     Passed - Microalbumin procedure in past 12 months or taking ACE/ARB        Passed - EGFRCR or GFRAA > 50     GFR Evaluation  EGFRCR: 69 , resulted on 11/2/2024          Passed - GFR in the past 12 months        Passed - Medication is active on med list               Future  Appointments         Provider Department Appt Notes    In 2 weeks Chavez Youssef MD [facility] [address], [place] *Care Gaps*    In 2 weeks [facility]           Recent Outpatient Visits              1 year ago Type 2 diabetes mellitus without complication, without long-term current use of insulin (HCC)    [facility] Chavez Youssef MD    Office Visit    2 years ago Polyp of colon, unspecified part of colon, unspecified type    [facility] [address], [place] Chavez Youssef MD    Office Visit    3 years ago Upper respiratory disease    [facility] [address], [place] Denia Haro PA-C    Telemedicine    3 years ago Chronic pain of both knees    [facility] Tony Gonzalez DO    Office Visit    3 years ago [facility] exam    [facility]    Office Visit

## 2025-02-02 DIAGNOSIS — I10 ESSENTIAL HYPERTENSION: ICD-10-CM

## 2025-02-05 RX ORDER — LOSARTAN POTASSIUM AND HYDROCHLOROTHIAZIDE 25; 100 MG/1; MG/1
1 TABLET ORAL DAILY
Qty: 90 TABLET | Refills: 3 | OUTPATIENT
Start: 2025-02-05

## 2025-02-05 RX ORDER — LOSARTAN POTASSIUM AND HYDROCHLOROTHIAZIDE 25; 100 MG/1; MG/1
1 TABLET ORAL DAILY
Qty: 90 TABLET | Refills: 3 | Status: SHIPPED | OUTPATIENT
Start: 2025-02-05

## 2025-03-06 ENCOUNTER — TELEPHONE (OUTPATIENT)
Dept: INTERNAL MEDICINE CLINIC | Facility: CLINIC | Age: 60
End: 2025-03-06

## 2025-03-06 DIAGNOSIS — E78.00 PURE HYPERCHOLESTEROLEMIA: ICD-10-CM

## 2025-03-07 ENCOUNTER — TELEPHONE (OUTPATIENT)
Dept: INTERNAL MEDICINE CLINIC | Facility: CLINIC | Age: 60
End: 2025-03-07

## 2025-03-07 RX ORDER — ATORVASTATIN CALCIUM 40 MG/1
40 TABLET, FILM COATED ORAL EVERY EVENING
Qty: 30 TABLET | Refills: 0 | Status: SHIPPED | OUTPATIENT
Start: 2025-03-07

## 2025-03-07 RX ORDER — ATORVASTATIN CALCIUM 40 MG/1
40 TABLET, FILM COATED ORAL EVERY EVENING
Qty: 30 TABLET | Refills: 0 | OUTPATIENT
Start: 2025-03-07

## 2025-03-07 NOTE — TELEPHONE ENCOUNTER
Patient  - Please disregard, patient is scheduled.    Asking for refill to get to appointment date:  Future Appointments   Date Time Provider Department Center   3/26/2025  8:40 AM [facility] [place]       Requested Prescriptions   Pending Prescriptions Disp Refills    atorvastatin 40 MG Oral Tab 30 tablet 0     Sig: Take 1 tablet (40 mg total) by mouth every evening.       Cholesterol Medication Protocol Passed - 3/7/2025  9:01 AM        Passed - ALT < 80        Passed - ALT resulted within past year        Passed - Lipid panel within past 12 months        Passed - In person appointment or virtual visit in the past 12 mos or appointment in next 3 mos        Passed - Medication is active on med list

## 2025-03-07 NOTE — TELEPHONE ENCOUNTER
Current Outpatient Medications:     atorvastatin 40 MG Oral Tab, Take 1 tablet (40 mg total) by mouth every evening., Disp: 30 tablet, Rfl: 0

## 2025-03-22 ENCOUNTER — HOSPITAL ENCOUNTER (OUTPATIENT)
Dept: MAMMOGRAPHY | Facility: HOSPITAL | Age: 60
Discharge: HOME | End: 2025-03-22
Attending: INTERNAL MEDICINE
Payer: MEDICAID

## 2025-03-22 DIAGNOSIS — Z12.31 SCREENING MAMMOGRAM, ENCOUNTER FOR: ICD-10-CM

## 2025-03-22 PROCEDURE — 77067 SCR MAMMO BI INCL CAD: CPT | Performed by: INTERNAL MEDICINE

## 2025-03-22 PROCEDURE — 77063 BREAST TOMOSYNTHESIS BI: CPT | Performed by: INTERNAL MEDICINE

## 2025-03-25 DIAGNOSIS — E78.00 PURE HYPERCHOLESTEROLEMIA: ICD-10-CM

## 2025-03-25 DIAGNOSIS — D64.9 ANEMIA, UNSPECIFIED TYPE: ICD-10-CM

## 2025-03-25 DIAGNOSIS — R11.0 NAUSEA: ICD-10-CM

## 2025-03-25 NOTE — TELEPHONE ENCOUNTER
Current Outpatient Medications   Medication Sig Dispense Refill    atorvastatin 40 MG Oral Tab Take 1 tablet (40 mg total) by mouth every evening. 30 tablet 0    losartan-hydroCHLOROthiazide 100-25 MG Oral Tab Take 1 tablet by mouth daily. 90 tablet 3    metFORMIN HCl 1000 MG Oral Tab TAKE 1 TABLET BY MOUTH TWICE DAILY( BREAKFAST AND EVERY EVENING) 180 tablet 3    folic acid 1 MG Oral Tab Take 1 tablet (1 mg total) by mouth daily. 90 tablet 1    ergocalciferol 1.25 MG (68395 UT) Oral Cap Take 1 capsule (50,000 Units total) by mouth once a week. For 12  weeks 4 capsule 2    cyclobenzaprine 10 MG Oral Tab Take 1 tablet (10 mg total) by mouth nightly as needed for Muscle spasms. 30 tablet 0      180 tablet 1    ondansetron (ZOFRAN) 4 mg tablet Take 1 tablet (4 mg total) by mouth every 8 (eight) hours as needed for Nausea. 20 tablet 0    Albuterol Sulfate HFA (PROAIR HFA) 108 (90 Base) MCG/ACT Inhalation Aero Soln Inhale 2 puffs into the lungs every 4 (four) hours as needed for Wheezing. 1 Inhaler 0    loratadine 10 MG Oral Tab Take 1 tablet (10 mg total) by mouth daily. 30 tablet 3    Montelukast Sodium 10 MG Oral Tab Take 1 tablet (10 mg total) by mouth nightly. 30 tablet 3

## 2025-03-25 NOTE — TELEPHONE ENCOUNTER
Outpatient Medication Detail     Disp Refills Start End    metFORMIN HCl 1000 MG Oral Tab 180 tablet 3 1/20/2025 --    Sig: TAKE 1 TABLET BY MOUTH TWICE DAILY( BREAKFAST AND EVERY EVENING)    Sent to pharmacy as: metFORMIN HCl 1000 MG Oral Tablet (GLUCOPHAGE)    Earliest Fill Date: 1/20/2025    E-Prescribing Status: Receipt confirmed by pharmacy (1/20/2025  8:43 AM CST)      Pharmacy    [facility] #[id number] [facility] [address] AT [facility] [address], [phone], [phone]

## 2025-03-25 NOTE — TELEPHONE ENCOUNTER
Outpatient Medication Detail     Disp Refills Start End    losartan-hydroCHLOROthiazide 100-25 MG Oral Tab 90 tablet 3 2/5/2025 --    Sig - Route: Take 1 tablet by mouth daily. - Oral    Sent to pharmacy as: Losartan Potassium-HCTZ 100-25 MG Oral Tablet (Hyzaar)    E-Prescribing Status: Receipt confirmed by pharmacy (2/5/2025  6:13 PM CST)      Associated Diagnoses    Essential hypertension        Pharmacy    [facility] #[id number] [facility] [address] AT [facility] [address], [phone], [phone]

## 2025-03-31 RX ORDER — FOLIC ACID 1 MG/1
1 TABLET ORAL DAILY
Qty: 90 TABLET | Refills: 1 | Status: SHIPPED | OUTPATIENT
Start: 2025-03-31

## 2025-03-31 RX ORDER — ONDANSETRON 4 MG/1
4 TABLET, FILM COATED ORAL EVERY 8 HOURS PRN
Qty: 20 TABLET | Refills: 0 | Status: SHIPPED | OUTPATIENT
Start: 2025-03-31

## 2025-03-31 RX ORDER — MONTELUKAST SODIUM 10 MG/1
10 TABLET ORAL NIGHTLY
Qty: 30 TABLET | Refills: 3 | Status: SHIPPED | OUTPATIENT
Start: 2025-03-31

## 2025-03-31 RX ORDER — LORATADINE 10 MG/1
10 TABLET ORAL DAILY
Qty: 30 TABLET | Refills: 3 | Status: SHIPPED | OUTPATIENT
Start: 2025-03-31

## 2025-03-31 RX ORDER — ERGOCALCIFEROL 1.25 MG/1
50000 CAPSULE, LIQUID FILLED ORAL WEEKLY
Qty: 4 CAPSULE | Refills: 2 | Status: SHIPPED | OUTPATIENT
Start: 2025-03-31

## 2025-03-31 RX ORDER — ALBUTEROL SULFATE 90 UG/1
2 INHALANT RESPIRATORY (INHALATION) EVERY 4 HOURS PRN
Qty: 1 EACH | Refills: 1 | Status: SHIPPED | OUTPATIENT
Start: 2025-03-31

## 2025-03-31 RX ORDER — ATORVASTATIN CALCIUM 40 MG/1
40 TABLET, FILM COATED ORAL EVERY EVENING
Qty: 90 TABLET | Refills: 3 | Status: SHIPPED | OUTPATIENT
Start: 2025-03-31

## 2025-03-31 RX ORDER — CYCLOBENZAPRINE HCL 10 MG
10 TABLET ORAL NIGHTLY PRN
Qty: 30 TABLET | Refills: 0 | Status: SHIPPED | OUTPATIENT
Start: 2025-03-31

## 2025-03-31 NOTE — TELEPHONE ENCOUNTER
Please review. Protocol Failed; No Protocol    Future Appointments   Date Time Provider Department Center   4/1/2025  3:00 PM Jamila Bowen APRN [id number] EC [place]   4/2/2025 12:20 PM [facility] RM4 [facility] EM [facility]     Medication(s) to Refill:   Requested Prescriptions     Pending Prescriptions Disp Refills    albuterol (PROAIR HFA) 108 (90 Base) MCG/ACT Inhalation Aero Soln 1 each 1     Sig: Inhale 2 puffs into the lungs every 4 (four) hours as needed for Wheezing.    atorvastatin 40 MG Oral Tab 90 tablet 3     Sig: Take 1 tablet (40 mg total) by mouth every evening.    folic acid 1 MG Oral Tab 90 tablet 1     Sig: Take 1 tablet (1 mg total) by mouth daily.    loratadine 10 MG Oral Tab 30 tablet 3     Sig: Take 1 tablet (10 mg total) by mouth daily.    montelukast 10 MG Oral Tab 30 tablet 3     Sig: Take 1 tablet (10 mg total) by mouth nightly.    ondansetron (ZOFRAN) 4 mg tablet 20 tablet 0     Sig: Take 1 tablet (4 mg total) by mouth every 8 (eight) hours as needed for Nausea.    cyclobenzaprine 10 MG Oral Tab 30 tablet 0     Sig: Take 1 tablet (10 mg total) by mouth nightly as needed for Muscle spasms.    ergocalciferol 1.25 MG (07917 UT) Oral Cap 4 capsule 2     Sig: Take 1 capsule (50,000 Units total) by mouth once a week. For 12  weeks         Reason for Medication Refill being sent to Provider / Reason Protocol Failed:  [x] Non-Protocol Medication        Recent Labs:  Lab Results   Component Value Date    VITD 10.3 (L) 06/10/2023

## 2025-04-01 DIAGNOSIS — E78.00 PURE HYPERCHOLESTEROLEMIA: ICD-10-CM

## 2025-04-02 ENCOUNTER — HOSPITAL ENCOUNTER (OUTPATIENT)
Dept: MAMMOGRAPHY | Facility: HOSPITAL | Age: 60
Discharge: HOME | End: 2025-04-02
Attending: INTERNAL MEDICINE
Payer: MEDICAID

## 2025-04-02 DIAGNOSIS — R92.8 ABNORMAL MAMMOGRAM: ICD-10-CM

## 2025-04-02 PROCEDURE — 77065 DX MAMMO INCL CAD UNI: CPT | Performed by: INTERNAL MEDICINE

## 2025-04-02 PROCEDURE — 77061 BREAST TOMOSYNTHESIS UNI: CPT | Performed by: INTERNAL MEDICINE

## 2025-04-03 RX ORDER — ATORVASTATIN CALCIUM 40 MG/1
40 TABLET, FILM COATED ORAL EVERY EVENING
Qty: 30 TABLET | Refills: 0 | OUTPATIENT
Start: 2025-04-03

## 2025-04-03 NOTE — TELEPHONE ENCOUNTER
Disp Refills Start End    atorvastatin 40 MG Oral Tab 90 tablet 3 3/31/2025 --    Sig - Route: Take 1 tablet (40 mg total) by mouth every evening. - Oral    Sent to pharmacy as: Atorvastatin Calcium 40 MG Oral Tablet (Lipitor)    E-Prescribing Status: Receipt confirmed by pharmacy (3/31/2025 10:10 AM CDT)      Associated Diagnoses    Pure hypercholesterolemia        Pharmacy    [facility] #[id number] [facility] [address] AT [facility] [address], [phone], [phone]

## 2025-04-09 NOTE — TELEPHONE ENCOUNTER
Please review. Protocol failed/No protocol. Last appointment 07/06/23.  Last Vitamin D level 06/10/23  10.3  last refill 03/31/25.      Requested Prescriptions   Pending Prescriptions Disp Refills    ERGOCALCIFEROL 1.25 MG (13036 UT) Oral Cap [Pharmacy Med Name: VITAMIN D2 50,000IU (ERGO) CAP RX] 4 capsule 2     Sig: TAKE 1 CAPSULE BY MOUTH 1 TIME A WEEK FOR 12 WEEKS       There is no refill protocol information for this order          Recent Outpatient Visits              1 year ago Type 2 diabetes mellitus without complication, without long-term current use of insulin ([facility]    Office Visit    2 years ago Polyp of colon, unspecified part of colon, unspecified type    [facility] [address], [place] Chavez Youssef MD    Office Visit    3 years ago Upper respiratory disease    [facility] [address], [place] Denia Haro PA-C    Telemedicine    3 years ago [facility] both [facility]    [facility]    3 years ago [facility] exam    [facility]    Office Visit            Future Appointments         Provider Department Appt Notes    In 1 week Jamila Bowen APRN [facility] [address], [place] *Diabetic Eye Exam Due*

## 2025-04-10 RX ORDER — ERGOCALCIFEROL 1.25 MG/1
50000 CAPSULE, LIQUID FILLED ORAL WEEKLY
Qty: 4 CAPSULE | Refills: 2 | OUTPATIENT
Start: 2025-04-10

## 2025-04-19 ENCOUNTER — OFFICE VISIT (OUTPATIENT)
Dept: INTERNAL MEDICINE CLINIC | Facility: CLINIC | Age: 60
End: 2025-04-19
Payer: MEDICAID

## 2025-04-19 VITALS
WEIGHT: 233 LBS | BODY MASS INDEX: 38.82 KG/M2 | HEART RATE: 83 BPM | TEMPERATURE: 97 F | DIASTOLIC BLOOD PRESSURE: 82 MMHG | SYSTOLIC BLOOD PRESSURE: 122 MMHG | HEIGHT: 65 IN

## 2025-04-19 DIAGNOSIS — D57.3 SICKLE CELL TRAIT: ICD-10-CM

## 2025-04-19 DIAGNOSIS — K63.5 POLYP OF COLON, UNSPECIFIED PART OF COLON, UNSPECIFIED TYPE: ICD-10-CM

## 2025-04-19 DIAGNOSIS — M25.551 CHRONIC RIGHT HIP PAIN: ICD-10-CM

## 2025-04-19 DIAGNOSIS — D64.9 ANEMIA, UNSPECIFIED TYPE: ICD-10-CM

## 2025-04-19 DIAGNOSIS — E11.9 TYPE 2 DIABETES MELLITUS WITHOUT COMPLICATION, WITHOUT LONG-TERM CURRENT USE OF INSULIN (HCC): ICD-10-CM

## 2025-04-19 DIAGNOSIS — G89.29 CHRONIC RIGHT HIP PAIN: ICD-10-CM

## 2025-04-19 DIAGNOSIS — E53.8 VITAMIN B 12 DEFICIENCY: Primary | ICD-10-CM

## 2025-04-19 DIAGNOSIS — Z00.00 WELLNESS EXAMINATION: ICD-10-CM

## 2025-04-19 PROCEDURE — 99396 PREV VISIT EST AGE 40-64: CPT | Performed by: NURSE PRACTITIONER

## 2025-04-19 RX ORDER — FERROUS SULFATE 324(65)MG
TABLET, DELAYED RELEASE (ENTERIC COATED) ORAL
Qty: 180 TABLET | Refills: 1 | Status: SHIPPED | OUTPATIENT
Start: 2025-04-19

## 2025-04-19 RX ORDER — LORATADINE 10 MG/1
10 TABLET ORAL DAILY
Qty: 30 TABLET | Refills: 3 | Status: SHIPPED | OUTPATIENT
Start: 2025-04-19

## 2025-04-19 NOTE — ASSESSMENT & PLAN NOTE
Schedule appointment with GI for evaluation/colonoscopy.  Orders:    Gastro Referral - In Network

## 2025-04-19 NOTE — ASSESSMENT & PLAN NOTE
Recheck CBC and B12.  Patient request refill of 2500 mcg of B12 supplement, declined sublingual, request tablets.  Rx sent.  Orders:    CBC W Differential W Platelet [E]    B12 AND FOLATE    Cyanocobalamin 2500 MCG Oral Tab; Take 2,500 mcg by mouth daily.

## 2025-04-19 NOTE — ASSESSMENT & PLAN NOTE
Updating referral to hematology, schedule appt.  Orders:    Oncology/Hematology Referral - In Network

## 2025-04-19 NOTE — PROGRESS NOTES
Roxane Jeronimo is a 59 year old female.  HPI:   Patient presents to clinic for annual physical.  History of type 2 diabetes.  Only on Metformin.  Reports she stopped Jardiance over 1 year ago.  Had a syncopal episode that she has associated to the medication.  Her last A1c test was 8.1% April 2024.  Has not followed up with her PCP since for management of diabetes.  Denies any hypoglycemic signs or symptoms.  Not checking BP at home.  History of hypertension, on losartan 100 mg, hydrochlorothiazide 25 mg.  She is taking a statin.  Seasonal allergies, using montelukast and loratadine as needed.  Denies any new or worsening symptoms.  She has history of sickle cell trait, vitamin B12 and iron deficiency.  She is due for colonoscopy.  Menses stopped age 49.  Reports chronic low back and right hip pain, would like to see orthopedic, requesting referral.  Denies any gait problems.  Current Medications[1]   Past Medical History[2]   Social History:  Short Social Hx on File[3]     REVIEW OF SYSTEMS:   Review of Systems   Constitutional:  Negative for activity change, appetite change, fatigue, fever and unexpected weight change.   HENT:  Negative for congestion, dental problem and sore throat.    Eyes:  Negative for visual disturbance.   Respiratory:  Negative for cough, chest tightness, shortness of breath and wheezing.    Cardiovascular:  Negative for chest pain, palpitations and leg swelling.   Gastrointestinal:  Negative for abdominal pain, constipation, diarrhea, nausea and vomiting.   Endocrine: Negative.    Genitourinary:  Negative for difficulty urinating, frequency and menstrual problem.   Musculoskeletal:  Negative for arthralgias and back pain.   Skin:  Negative for color change, pallor, rash and wound.   Neurological:  Negative for dizziness, seizures, light-headedness, numbness and headaches.   Psychiatric/Behavioral:  Negative for behavioral problems, dysphoric mood and suicidal ideas. The patient is not  nervous/anxious.           EXAM:   /82 (BP Location: Left arm, Patient Position: Sitting, Cuff Size: large)   Pulse 83   Temp 97.2 °F (36.2 °C) (Temporal)   Ht 5' 5\" (1.651 m)   Wt 233 lb (105.7 kg)   BMI 38.77 kg/m²     Physical Exam  Vitals reviewed.   Constitutional:       General: She is not in acute distress.     Appearance: Normal appearance. She is obese. She is not ill-appearing.   HENT:      Head: Normocephalic and atraumatic.      Right Ear: Tympanic membrane, ear canal and external ear normal.      Left Ear: Tympanic membrane, ear canal and external ear normal.      Nose: Nose normal.      Mouth/Throat:      Pharynx: Oropharynx is clear.   Eyes:      General: No scleral icterus.        Right eye: No discharge.         Left eye: No discharge.      Extraocular Movements: Extraocular movements intact.      Conjunctiva/sclera: Conjunctivae normal.      Pupils: Pupils are equal, round, and reactive to light.   Neck:      Thyroid: No thyroid mass or thyromegaly.   Cardiovascular:      Rate and Rhythm: Normal rate and regular rhythm.      Pulses: Normal pulses.      Heart sounds: Normal heart sounds.   Pulmonary:      Effort: Pulmonary effort is normal. No respiratory distress.      Breath sounds: Normal breath sounds.   Abdominal:      General: Abdomen is flat. Bowel sounds are normal. There is no distension.      Palpations: Abdomen is soft. There is no mass.      Tenderness: There is no abdominal tenderness.   Musculoskeletal:         General: Normal range of motion.      Cervical back: Normal range of motion and neck supple.      Right lower leg: No edema.      Left lower leg: No edema.   Lymphadenopathy:      Cervical: No cervical adenopathy.   Skin:     General: Skin is warm and dry.      Coloration: Skin is not jaundiced.   Neurological:      General: No focal deficit present.      Mental Status: She is alert and oriented to person, place, and time.      Motor: Motor function is intact.       Gait: Gait normal.   Psychiatric:         Mood and Affect: Mood normal.         Judgment: Judgment normal.            ASSESSMENT AND PLAN:     Assessment & Plan  Vitamin B 12 deficiency  Recheck CBC and B12.  Patient request refill of 2500 mcg of B12 supplement, declined sublingual, request tablets.  Rx sent.  Orders:    CBC W Differential W Platelet [E]    B12 AND FOLATE    Cyanocobalamin 2500 MCG Oral Tab; Take 2,500 mcg by mouth daily.    Anemia, unspecified type  Chronic, on iron supplementation and B12.  History of sickle cell.  Advising GI for further evaluation.  Patient agreeable.  Refilled iron supplementation and will recheck labs.  Patient was referred to see hematologist in the past but did not schedule.  Will provide updated referral for patient.  Orders:    Ferrous Sulfate 324 MG Oral Tab EC; Take orally  1 tab twice per day    CBC W Differential W Platelet [E]    Ferritin [E]    Iron And Tibc [E]    Cyanocobalamin 2500 MCG Oral Tab; Take 2,500 mcg by mouth daily.    Gastro Referral - In Network    Oncology/Hematology Referral - In Network    Type 2 diabetes mellitus without complication, without long-term current use of insulin ([facility])  Advised adherence to follow-up.  Patient is requesting podiatry referral for her foot exams.  Labs per orders.  A1c ordered by PCP, advised to complete ASAP.  Patient is only on Jardiance.  Advised LC diet.  Will await A1c results for further recommendations.  Patient declines I exam referral, states she has established care with an eye doctor and has an appointment this year.  Orders:    Microalb/Creat Ratio, Random Urine [E]    Podiatry Referral - In Network    Chronic right hip pain  Referral to Ortho.  Orders:    Ortho Referral - In Network    Polyp of colon, unspecified part of colon, unspecified type  Schedule appointment with GI for evaluation/colonoscopy.  Orders:    Gastro Referral - In Network    Wellness examination  Education provided on healthy  lifestyle.  Diet: reduce saturated fats, simple carbs and excess sugars. Hydrate. Leafy greens, legumes, nuts/seeds, healthy sources of Omega 3, lean proteins, complex carbs, berries.   Exercise 30 min daily cardio as tolerated.   Immunizations reviewed and recommendations provided  Preventative health screening recommendations reviewed.   Previous lab and imaging results reviewed.         Sickle cell trait  Updating referral to hematology, schedule appt.  Orders:    Oncology/Hematology Referral - In Network         The patient indicates understanding of these issues and agrees to the plan.  The patient is asked to return in 3 months.     The above note was creating using Dragon speech recognition technology. Please excuse any typos.       [1]   Current Outpatient Medications   Medication Sig Dispense Refill    Ferrous Sulfate 324 MG Oral Tab EC Take orally  1 tab twice per day 180 tablet 1    Cyanocobalamin 2500 MCG Oral Tab Take 2,500 mcg by mouth daily. 90 tablet 1    loratadine 10 MG Oral Tab Take 1 tablet (10 mg total) by mouth daily. 30 tablet 3    albuterol (PROAIR HFA) 108 (90 Base) MCG/ACT Inhalation Aero Soln Inhale 2 puffs into the lungs every 4 (four) hours as needed for Wheezing. 1 each 1    atorvastatin 40 MG Oral Tab Take 1 tablet (40 mg total) by mouth every evening. 90 tablet 3    folic acid 1 MG Oral Tab Take 1 tablet (1 mg total) by mouth daily. 90 tablet 1    montelukast 10 MG Oral Tab Take 1 tablet (10 mg total) by mouth nightly. 30 tablet 3    ondansetron (ZOFRAN) 4 mg tablet Take 1 tablet (4 mg total) by mouth every 8 (eight) hours as needed for Nausea. 20 tablet 0    cyclobenzaprine 10 MG Oral Tab Take 1 tablet (10 mg total) by mouth nightly as needed for Muscle spasms. 30 tablet 0    ergocalciferol 1.25 MG (80641 UT) Oral Cap Take 1 capsule (50,000 Units total) by mouth once a week. For 12  weeks 4 capsule 2    losartan-hydroCHLOROthiazide 100-25 MG Oral Tab Take 1 tablet by mouth daily. 90  tablet 3    metFORMIN HCl 1000 MG Oral Tab TAKE 1 TABLET BY MOUTH TWICE DAILY( BREAKFAST AND EVERY EVENING) 180 tablet 3    fexofenadine (ALLEGRA ALLERGY) 180 MG Oral Tab Take 1 tablet (180 mg total) by mouth daily as needed. Take 1 tablet once daily for -2 -3  weeks and then as needed. 90 tablet 1    GABAPENTIN 100 MG Oral Cap TAKE 1 CAPSULE(100 MG) BY MOUTH EVERY NIGHT 30 capsule 1    FLUTICASONE PROPIONATE 50 MCG/ACT Nasal Suspension SHAKE LIQUID AND USE 2 SPRAYS IN EACH NOSTRIL EVERY DAY FOR 1 TO 2 WEEKS THEN AS NEEDED 3 Bottle 0    Glucose Blood (TIFFANIE CONTOUR NEXT TEST) In Vitro Strip Test blood glucose three times a daily. 100 strip 11    Blood Glucose Monitoring Suppl ([employer] CONTOUR NEXT MONITOR) w/Device Does not apply Kit 1 Application by Does not apply route 3 (three) times daily. 1 kit 0    aspirin 81 MG Oral Chew Tab Chew 1 tablet (81 mg total) by mouth daily.      LANCETS ULTRA THIN 30G Does not apply Misc 30 g by Does not apply route 3 (three) times daily. 200 each 11   [2]   Past Medical History:   Allergic rhinitis    Anemia    Arthritis    Diabetes (HCC)    Fall    High blood pressure    High cholesterol    Hyperlipidemia    Osteoarthritis    Sickle cell trait    Unspecified essential hypertension   [3]   Social History  Socioeconomic History    Marital status: Single   Tobacco Use    Smoking status: Never    Smokeless tobacco: Never   Vaping Use    Vaping status: Never Used   Substance and Sexual Activity    Alcohol use: Never    Drug use: Never   Other Topics Concern    Caffeine Concern No    Exercise Yes   Social History Narrative    The patient does not use an assistive device..      The patient does live in a home with stairs.

## 2025-04-19 NOTE — ASSESSMENT & PLAN NOTE
Chronic, on iron supplementation and B12.  History of sickle cell.  Advising GI for further evaluation.  Patient agreeable.  Refilled iron supplementation and will recheck labs.  Patient was referred to see hematologist in the past but did not schedule.  Will provide updated referral for patient.  Orders:    Ferrous Sulfate 324 MG Oral Tab EC; Take orally  1 tab twice per day    CBC W Differential W Platelet [E]    Ferritin [E]    Iron And Tibc [E]    Cyanocobalamin 2500 MCG Oral Tab; Take 2,500 mcg by mouth daily.    Gastro Referral - In Network    Oncology/Hematology Referral - In Network

## 2025-04-19 NOTE — ASSESSMENT & PLAN NOTE
Advised adherence to follow-up.  Patient is requesting podiatry referral for her foot exams.  Labs per orders.  A1c ordered by PCP, advised to complete ASAP.  Patient is only on Jardiance.  Advised LC diet.  Will await A1c results for further recommendations.  Patient declines I exam referral, states she has established care with an eye doctor and has an appointment this year.  Orders:    Microalb/Creat Ratio, Random Urine [E]    Podiatry Referral - In Network     Normal

## 2025-07-02 NOTE — LETTER
July 5, 2017         Deidre Garcia MD  Counts include 234 beds at the [facility]      Patient: Gonzales Gilliland   YOB: 1965   Date of Visit: 7/5/2017       Dear Dr. Sondra Flores MD,    I saw your patient, Gonzales Gilliland, on 7/5/2017.  Enclosed is my
02-Jul-2025 07:40
I personally performed the service described in the documentation recorded by the scribe in my presence, and it accurately and completely records my words and actions.

## 2025-08-04 RX ORDER — MONTELUKAST SODIUM 10 MG/1
10 TABLET ORAL NIGHTLY
Qty: 90 TABLET | Refills: 3 | Status: SHIPPED | OUTPATIENT
Start: 2025-08-04

## 2025-08-23 ENCOUNTER — LAB ENCOUNTER (OUTPATIENT)
Dept: LAB | Facility: HOSPITAL | Age: 60
End: 2025-08-23
Attending: INTERNAL MEDICINE

## 2025-08-23 LAB
BASOPHILS # BLD AUTO: 0.06 X10(3) UL (ref 0–0.2)
BASOPHILS NFR BLD AUTO: 0.8 %
CREAT UR-SCNC: 191.1 MG/DL
DEPRECATED HBV CORE AB SER IA-ACNC: 37 NG/ML (ref 50–306)
DEPRECATED RDW RBC AUTO: 47 FL (ref 35.1–46.3)
EOSINOPHIL # BLD AUTO: 0.34 X10(3) UL (ref 0–0.7)
EOSINOPHIL NFR BLD AUTO: 4.6 %
ERYTHROCYTE [DISTWIDTH] IN BLOOD BY AUTOMATED COUNT: 18.9 % (ref 11–15)
FOLATE SERPL-MCNC: 11.7 NG/ML (ref 5.4–?)
HCT VFR BLD AUTO: 33.6 % (ref 35–48)
HGB BLD-MCNC: 11.4 G/DL (ref 12–16)
IMM GRANULOCYTES # BLD AUTO: 0.02 X10(3) UL (ref 0–1)
IMM GRANULOCYTES NFR BLD: 0.3 %
IRON SATN MFR SERPL: 15 % (ref 15–50)
IRON SERPL-MCNC: 47 UG/DL (ref 50–170)
LYMPHOCYTES # BLD AUTO: 3.24 X10(3) UL (ref 1–4)
LYMPHOCYTES NFR BLD AUTO: 44 %
MCH RBC QN AUTO: 27.3 PG (ref 26–34)
MCHC RBC AUTO-ENTMCNC: 33.9 G/DL (ref 31–37)
MCV RBC AUTO: 80.6 FL (ref 80–100)
MICROALBUMIN UR-MCNC: 0.4 MG/DL
MICROALBUMIN/CREAT 24H UR-RTO: 2.1 UG/MG (ref ?–30)
MONOCYTES # BLD AUTO: 0.49 X10(3) UL (ref 0.1–1)
MONOCYTES NFR BLD AUTO: 6.6 %
NEUTROPHILS # BLD AUTO: 3.22 X10 (3) UL (ref 1.5–7.7)
NEUTROPHILS # BLD AUTO: 3.22 X10(3) UL (ref 1.5–7.7)
NEUTROPHILS NFR BLD AUTO: 43.7 %
PLATELET # BLD AUTO: 328 10(3)UL (ref 150–450)
RBC # BLD AUTO: 4.17 X10(6)UL (ref 3.8–5.3)
TOTAL IRON BINDING CAPACITY: 304 UG/DL (ref 250–425)
TRANSFERRIN SERPL-MCNC: 228 MG/DL (ref 250–380)
VIT B12 SERPL-MCNC: 292 PG/ML (ref 211–911)
WBC # BLD AUTO: 7.4 X10(3) UL (ref 4–11)

## 2025-08-23 PROCEDURE — 83540 ASSAY OF IRON: CPT | Performed by: NURSE PRACTITIONER

## 2025-08-23 PROCEDURE — 36415 COLL VENOUS BLD VENIPUNCTURE: CPT | Performed by: INTERNAL MEDICINE

## 2025-08-23 PROCEDURE — 85025 COMPLETE CBC W/AUTO DIFF WBC: CPT | Performed by: INTERNAL MEDICINE

## 2025-08-23 PROCEDURE — 82728 ASSAY OF FERRITIN: CPT | Performed by: NURSE PRACTITIONER

## 2025-08-23 PROCEDURE — 82607 VITAMIN B-12: CPT | Performed by: NURSE PRACTITIONER

## 2025-08-23 PROCEDURE — 82570 ASSAY OF URINE CREATININE: CPT | Performed by: NURSE PRACTITIONER

## 2025-08-23 PROCEDURE — 82746 ASSAY OF FOLIC ACID SERUM: CPT | Performed by: NURSE PRACTITIONER

## 2025-08-23 PROCEDURE — 82043 UR ALBUMIN QUANTITATIVE: CPT | Performed by: NURSE PRACTITIONER

## 2025-08-23 PROCEDURE — 83036 HEMOGLOBIN GLYCOSYLATED A1C: CPT | Performed by: INTERNAL MEDICINE

## 2025-08-23 PROCEDURE — 84466 ASSAY OF TRANSFERRIN: CPT | Performed by: NURSE PRACTITIONER

## (undated) DIAGNOSIS — E78.00 PURE HYPERCHOLESTEROLEMIA: ICD-10-CM

## (undated) DIAGNOSIS — D64.9 ANEMIA, UNSPECIFIED TYPE: ICD-10-CM

## (undated) DIAGNOSIS — I10 ESSENTIAL HYPERTENSION: ICD-10-CM

## (undated) DIAGNOSIS — J30.9 ALLERGIC RHINITIS, UNSPECIFIED SEASONALITY, UNSPECIFIED TRIGGER: ICD-10-CM

## (undated) DEVICE — Device: Brand: DEFENDO AIR/WATER/SUCTION AND BIOPSY VALVE

## (undated) DEVICE — REM POLYHESIVE ADULT PATIENT RETURN ELECTRODE: Brand: VALLEYLAB

## (undated) DEVICE — CLIP RESOLUTION 235CM

## (undated) DEVICE — ENDOSCOPY PACK - LOWER: Brand: MEDLINE INDUSTRIES, INC.

## (undated) DEVICE — SNARE CAPTIFLEX MICRO-OVL OLY

## (undated) NOTE — MR AVS SNAPSHOT
[facility]  [address] [place] [address], [address] [facility]  Elsa Carter               Thank you for choosing us for your health care visit with Ember Slater MD.  We are glad to serve you and happy to provide you with this summary CBC W Differential W Platelet [E]    Complete by: May 09, 2017 (Approximate)    Assoc Dx:  Type 2 diabetes mellitus without complication, without long-term current use of insulin (HCC) [E11.9]           Comp Metabolic Panel (14)    Complete by:   May 09, [address] FOLLOW-UP,[facility] ED(INTERNAL)    Complete by:  As directed    Assoc Dx:  Type 2 diabetes mellitus without complication, without long-term current use of insulin [facility] [E11.9]                 Follow-up Instructions     Return in about 3 (Medicare covers 10 hours initial DSMT in 12 month period plus 2 hours follow-up DSMT annually thereafter.)    Check types of training services needed and send recent labs for patient eligibility and outcome monitoring      Referral Orders      Normal Ord requirements for authorization, please wait 5-7 days and then contact your physician's office. At that time, you will be provided with any authorization numbers or be assured that none are required. You can then schedule your appointment.  Failure to obtain Type 2 diabetes mellitus without complication, without long-term current use of insulin    Screening mammogram, encounter for        Diabetic eye exam        Screen for colon cancer        PE (physical exam), routine          Instructions and Information - MetFORMIN HCl 1000 MG Tabs            LikeWherehart     Sign up for [facility], your secure online medical record. iSpecimen will allow you to access patient instructions from your recent visit,  view other health information, and more.  To sign up or find more inf

## (undated) NOTE — LETTER
12/11/19        [address]      Dear Hugh Maldonado records indicate that you have outstanding lab work and or testing that was ordered for you and has not yet been completed:  Orders Placed This Encounter      Ferritin [E]      Folic Acid Serum [E]      Iron And Tibc      Folic Acid Serum [E]      Ferritin [E]      CBC W Differential W Platelet [E]    To provide you with the best possible care, please complete these orders at your earliest convenience. If you have recently completed these orders please disregard this letter. If you have any questions please call the office at Dept: [phone].      Thank you,       Nasrin Smith MD

## (undated) NOTE — ED AVS SNAPSHOT
Eben Bucio   MRN: [id number]    Department:  [facility] Emergency Department   Date of Visit:  2/9/2018           Disclosure     Insurance plans vary and the physician(s) referred by the ER may not be covered by your plan.  Please contact your i tell this physician (or your personal doctor if your instructions are to return to your personal doctor) about any new or lasting problems. The primary care or specialist physician will see patients referred from the [facility] Emergency Department.  Shar Jack

## (undated) NOTE — ED AVS SNAPSHOT
Sachi Ariza   MRN: [id number]    Department:  [facility] Emergency Department   Date of Visit:  2/12/2018           Disclosure     Insurance plans vary and the physician(s) referred by the ER may not be covered by your plan.  Please contact your tell this physician (or your personal doctor if your instructions are to return to your personal doctor) about any new or lasting problems. The primary care or specialist physician will see patients referred from the [facility] Emergency Department.  Olamide Latham

## (undated) NOTE — LETTER
09/10/19        [address]      Dear Soren Obrien records indicate that you have outstanding lab work and or testing that was ordered for you and has not yet been completed:  Orders Placed This Encounter      Ferritin [E]      Folic Acid Serum [E]      Iron And Tibc    To provide you with the best possible care, please complete these orders at your earliest convenience. If you have recently completed these orders please disregard this letter. If you have any questions please call the office at Dept: [phone].      Thank you,       Bindu Patton MD

## (undated) NOTE — LETTER
[address]  [facility]  [phone]        Dear Karishma Murillo,      I had the pleasure of seeing your patient, Adriana Christine on 10/21/2019. Below please find a summary of our visit.   If you have any

## (undated) NOTE — LETTER
Mignon Holland Md  [address] [place] [address], [address] [place] [address]       10/31/19        Patient: Kim Doyle   YOB: 1965   Date of Visit: 10/31/2019       Dear  Dr. Cassie Buitrago MD,      Thank you for referring Kim Doyle to my practice.   P

## (undated) NOTE — LETTER
08/01/19        [address]      Dear David Suarez records indicate that you have outstanding lab work and or testing that was ordered for you and has not yet been completed:  Orders Placed This Encounter      CBC W Diff

## (undated) NOTE — ED AVS SNAPSHOT
Sachi Ariza   MRN: [id number]    Department:  [facility] Emergency Department   Date of Visit:  7/21/2019           Disclosure     Insurance plans vary and the physician(s) referred by the ER may not be covered by your plan.  Please contact yo CARE PHYSICIAN AT ONCE OR RETURN IMMEDIATELY TO THE EMERGENCY DEPARTMENT. If you have been prescribed any medication(s), please fill your prescription right away and begin taking the medication(s) as directed.   If you believe that any of the medications

## (undated) NOTE — LETTER
Patient Name: Vandana Ornelas  :   MRN: [id number]  Patient Address: [place] [address]      Coronavirus Disease 2019 (COVID-19)     [facility] is committed to the safety and well-being of our patients, members, employees, a symptoms get worse, call your healthcare provider immediately. 3. Get rest and stay hydrated.    4. If you have a medical appointment, call the healthcare provider ahead of time and tell them that you have or may have COVID-19.  5. For medical emergencies, medications; and  · Improvement in respiratory symptoms (e.g., cough, shortness of breath); and  · At least 10 days have passed since symptoms first appeared OR if asymptomatic patient or date of symptom onset is unclear then use 10 days post COVID test da must:    · Have had a confirmed diagnosis of COVID-19  · Be symptom-free for at least 14 days*    *Some people will be required to have a repeat COVID-19 test in order to be eligible to donate.  If you’re instructed by Deepa Donald that a repeat test is required Researchers are trying to identify similarities between people with a Post-COVID condition to better understand if there are risk factors. How do I prevent a Post-COVID condition?   The best way to prevent the long-term symptoms of COVID-19 is by preve

## (undated) NOTE — LETTER
7/14/2020    [address] [place] [address]            Dear Vandana Ornelas,      Our records indicate that you are due for an appointment for a Colonoscopy with Francois Berkowitz MD.    Please call our office to schedule this appointme

## (undated) NOTE — ED AVS SNAPSHOT
Buzz Gonsalves   MRN: [id number]    Department:  [facility]   Date of Visit:  7/15/2018           Disclosure     Insurance plans vary and the physician(s) referred by the ER may not be covered by your plan.  Please contact yo CARE PHYSICIAN AT ONCE OR RETURN IMMEDIATELY TO THE EMERGENCY DEPARTMENT. If you have been prescribed any medication(s), please fill your prescription right away and begin taking the medication(s) as directed.   If you believe that any of the medications

## (undated) NOTE — ED AVS SNAPSHOT
Lina Oliver   MRN: [id number]    Department:  [facility]   Date of Visit:  6/14/2018           Disclosure     Insurance plans vary and the physician(s) referred by the ER may not be covered by your plan.  Please contact yo CARE PHYSICIAN AT ONCE OR RETURN IMMEDIATELY TO THE EMERGENCY DEPARTMENT. If you have been prescribed any medication(s), please fill your prescription right away and begin taking the medication(s) as directed.   If you believe that any of the medications

## (undated) NOTE — LETTER
Patient Name: Wei Amezquita  : 89  MRN: [id number]  Patient Address: [place] [address]      Coronavirus Disease 2019 (COVID-19)     [facility] is committed to the safety and well-being of our patients, members, employees, a symptoms get worse, call your healthcare provider immediately. 3. Get rest and stay hydrated.    4. If you have a medical appointment, call the healthcare provider ahead of time and tell them that you have or may have COVID-19.  5. For medical emergencies, medications; and  · Improvement in respiratory symptoms (e.g., cough, shortness of breath); and  · At least 10 days have passed since symptoms first appeared OR if asymptomatic patient or date of symptom onset is unclear then use 10 days post COVID test da must:    · Have had a confirmed diagnosis of COVID-19  · Be symptom-free for at least 14 days*    *Some people will be required to have a repeat COVID-19 test in order to be eligible to donate.  If you’re instructed by Ivette Gavin that a repeat test is required Researchers are trying to identify similarities between people with a Post-COVID condition to better understand if there are risk factors. How do I prevent a Post-COVID condition?   The best way to prevent the long-term symptoms of COVID-19 is by preve

## (undated) NOTE — LETTER
10/26/20        [address]      Dear Samm Borja records indicate that you have outstanding lab work and or testing that was ordered for you and has not yet been completed:  Orders Placed This Encounter      CBC W Diff

## (undated) NOTE — LETTER
[address], [facility]   Date:   9/2/2021     Name:   Kim Doyle    YOB: 1965   MRN:   [id number]       WHERE IS YOUR PAIN NOW?   Jeronimo Prost the areas on your body where you feel the described

## (undated) NOTE — LETTER
Patient Name: Vandana Ornelas  : [age]  MRN: [id number]  Patient Address: [place] [address]      Coronavirus Disease 2019 (COVID-19)     [place] [address] is committed to the safety and well-being of our patients, members, employees, a symptoms get worse, call your healthcare provider immediately. 3. Get rest and stay hydrated.    4. If you have a medical appointment, call the healthcare provider ahead of time and tell them that you have or may have COVID-19.  5. For medical emergencies, medications; and  · Improvement in respiratory symptoms (e.g., cough, shortness of breath); and  · At least 10 days have passed since symptoms first appeared OR if asymptomatic patient or date of symptom onset is unclear then use 10 days post COVID test da must:    · Have had a confirmed diagnosis of COVID-19  · Be symptom-free for at least 14 days*    *Some people will be required to have a repeat COVID-19 test in order to be eligible to donate.  If you’re instructed by Juan C Johnson that a repeat test is required Researchers are trying to identify similarities between people with a Post-COVID condition to better understand if there are risk factors. How do I prevent a Post-COVID condition?   The best way to prevent the long-term symptoms of COVID-19 is by preve

## (undated) NOTE — MR AVS SNAPSHOT
[facility]  [address] [facility] [address] [email or web address]  [phone]               Thank you for choosing us for your health care visit with Jefferson Hay NP.   We are glad to serve you and happy to provide you with this summary of y swell. This may cause pain and headache.  Common allergy symptoms include:  · Runny nose with clear, watery discharge  · Stuffy nose (nasal congestion)  · Drainage down your throat (postnasal drip)  · Sneezing  · Red, watery eyes  · Itchy nose, eyes, ears, Commonly known as:  FLONASE           Losartan Potassium-HCTZ 100-25 MG Tabs   TK 1 T PO D   Commonly known as:  HYZAAR           MetFORMIN HCl 1000 MG Tabs   TK 1 T PO BID   Commonly known as:  GLUCOPHAGE           methylPREDNISolone 4 MG Tbpk   As direct Eat plenty of protein, keep the fat content low Sugars:  sodas and sports drinks, candies and desserts   Eat plenty of low-fat dairy products High fat meats and dairy   Choose whole grain products Foods high in sodium   Water is best for hydration Fast evelio

## (undated) NOTE — LETTER
Date & Time: 7/21/2019, 7:29 PM  Patient: Roseanne Loredo  Encounter Provider(s):    Jeffery Olszewski, MD       To Whom It May Concern:    Osmar Spicer was seen and treated in our department on 7/21/2019. She may return to work 7/22/2019.     If you have an

## (undated) NOTE — LETTER
12/11/2020              [address] [place] [id number]             To Whom it May Concern,   Leeann Jiang is currently under my medical care and may return to work on 12/14/2020 . If any questions, please call the our office [phone].              El Brooke MD  [address] [place] [address], [facility] [address], [place] [address], [address] [place] [address] [facility]        Document electronically generated by:  El Brooke

## (undated) NOTE — ED AVS SNAPSHOT
Eben Bucio   MRN: [id number]    Department:  [facility] Emergency Department   Date of Visit:  9/14/2019           Disclosure     Insurance plans vary and the physician(s) referred by the ER may not be covered by your plan.  Please contact yo CARE PHYSICIAN AT ONCE OR RETURN IMMEDIATELY TO THE EMERGENCY DEPARTMENT. If you have been prescribed any medication(s), please fill your prescription right away and begin taking the medication(s) as directed.   If you believe that any of the medications